# Patient Record
Sex: MALE | Race: WHITE | Employment: OTHER | URBAN - METROPOLITAN AREA
[De-identification: names, ages, dates, MRNs, and addresses within clinical notes are randomized per-mention and may not be internally consistent; named-entity substitution may affect disease eponyms.]

---

## 2017-03-06 ENCOUNTER — OFFICE VISIT (OUTPATIENT)
Dept: INTERNAL MEDICINE CLINIC | Facility: CLINIC | Age: 77
End: 2017-03-06

## 2017-03-06 VITALS
OXYGEN SATURATION: 98 % | BODY MASS INDEX: 34.86 KG/M2 | SYSTOLIC BLOOD PRESSURE: 110 MMHG | DIASTOLIC BLOOD PRESSURE: 70 MMHG | WEIGHT: 257.38 LBS | HEIGHT: 72 IN | HEART RATE: 80 BPM | TEMPERATURE: 98 F

## 2017-03-06 DIAGNOSIS — I10 ESSENTIAL HYPERTENSION: ICD-10-CM

## 2017-03-06 DIAGNOSIS — J40 BRONCHITIS: ICD-10-CM

## 2017-03-06 DIAGNOSIS — R05.9 COUGH: Primary | ICD-10-CM

## 2017-03-06 PROCEDURE — 99213 OFFICE O/P EST LOW 20 MIN: CPT | Performed by: INTERNAL MEDICINE

## 2017-03-06 PROCEDURE — G0463 HOSPITAL OUTPT CLINIC VISIT: HCPCS | Performed by: INTERNAL MEDICINE

## 2017-03-06 RX ORDER — AZITHROMYCIN 250 MG/1
TABLET, FILM COATED ORAL
Qty: 6 TABLET | Refills: 1 | Status: SHIPPED | OUTPATIENT
Start: 2017-03-06 | End: 2017-03-11

## 2017-03-06 RX ORDER — PROMETHAZINE HYDROCHLORIDE AND CODEINE PHOSPHATE 6.25; 1 MG/5ML; MG/5ML
5 SYRUP ORAL EVERY 4 HOURS PRN
Qty: 240 ML | Refills: 0 | Status: SHIPPED | OUTPATIENT
Start: 2017-03-06 | End: 2017-03-16

## 2017-03-06 NOTE — PROGRESS NOTES
Virginia Daley is a 77year old male. Patient presents with:  Cough: non productive cough, chest congestion, x 2 weeks   Hypertension    HPI:   Patient presents with:  Cough: non productive cough, chest congestion, x 2 weeks   Hypertension    Pt has had a c normal oropharynx, normal TM's. Ears are normal. Eyes are normal  NECK: supple,no lymphadenopathy or masses, no bruits  CHEST: Well-developed male.   LUNGS: clear to auscultation  CARDIO: RRR, normal S1S2, without murmur   GI:Protuberant, BS are present, no

## 2017-03-06 NOTE — PATIENT INSTRUCTIONS
1.  Patient is to continue his current diet, medications and activity. 2.  I will give the patient patient a prescription for Zithromax. I will give her one refill to use as necessary  3.   I will give the patient a prescription for promethazine with code

## 2017-04-11 RX ORDER — LISINOPRIL 10 MG/1
10 TABLET ORAL DAILY
Qty: 90 TABLET | Refills: 3 | Status: SHIPPED | OUTPATIENT
Start: 2017-04-11 | End: 2018-04-11 | Stop reason: WASHOUT

## 2017-04-11 NOTE — TELEPHONE ENCOUNTER
From: Namita Gilmore  To:  Noemi Tang MD  Sent: 4/11/2017 8:41 AM CDT  Subject: Medication Renewal Request    Original authorizing provider: MD Namita Sharma would like a refill of the following medications:  lisinopril 10 MG

## 2017-04-24 ENCOUNTER — APPOINTMENT (OUTPATIENT)
Dept: LAB | Age: 77
End: 2017-04-24
Attending: INTERNAL MEDICINE
Payer: MEDICARE

## 2017-04-24 DIAGNOSIS — E78.9 BORDERLINE HIGH CHOLESTEROL: ICD-10-CM

## 2017-04-24 PROCEDURE — 36415 COLL VENOUS BLD VENIPUNCTURE: CPT

## 2017-04-24 PROCEDURE — 80061 LIPID PANEL: CPT

## 2017-05-25 ENCOUNTER — OFFICE VISIT (OUTPATIENT)
Dept: INTERNAL MEDICINE CLINIC | Facility: CLINIC | Age: 77
End: 2017-05-25

## 2017-05-25 VITALS
TEMPERATURE: 98 F | HEART RATE: 72 BPM | WEIGHT: 260 LBS | BODY MASS INDEX: 35.21 KG/M2 | DIASTOLIC BLOOD PRESSURE: 86 MMHG | HEIGHT: 72 IN | SYSTOLIC BLOOD PRESSURE: 122 MMHG

## 2017-05-25 DIAGNOSIS — H91.91 HEARING LOSS OF RIGHT EAR, UNSPECIFIED HEARING LOSS TYPE: ICD-10-CM

## 2017-05-25 DIAGNOSIS — K57.30 DIVERTICULOSIS OF LARGE INTESTINE WITHOUT HEMORRHAGE: ICD-10-CM

## 2017-05-25 DIAGNOSIS — I10 ESSENTIAL HYPERTENSION: Primary | ICD-10-CM

## 2017-05-25 DIAGNOSIS — Z12.5 PROSTATE CANCER SCREENING: ICD-10-CM

## 2017-05-25 DIAGNOSIS — N40.1 BENIGN NON-NODULAR PROSTATIC HYPERPLASIA WITH LOWER URINARY TRACT SYMPTOMS: ICD-10-CM

## 2017-05-25 DIAGNOSIS — E78.9 BORDERLINE HIGH CHOLESTEROL: ICD-10-CM

## 2017-05-25 DIAGNOSIS — R53.83 FATIGUE, UNSPECIFIED TYPE: ICD-10-CM

## 2017-05-25 DIAGNOSIS — M15.9 PRIMARY OSTEOARTHRITIS INVOLVING MULTIPLE JOINTS: ICD-10-CM

## 2017-05-25 DIAGNOSIS — Z00.00 ANNUAL PHYSICAL EXAM: ICD-10-CM

## 2017-05-25 PROCEDURE — G0463 HOSPITAL OUTPT CLINIC VISIT: HCPCS | Performed by: INTERNAL MEDICINE

## 2017-05-25 PROCEDURE — 99214 OFFICE O/P EST MOD 30 MIN: CPT | Performed by: INTERNAL MEDICINE

## 2017-05-25 NOTE — PATIENT INSTRUCTIONS
1.  Patient is to continue his current diet, medications and activity. 2.  Patient is to continue to follow-up with Dr. Yeny Izaguirre as he is doing.   3.  I will plan to see the patient back in 6 months with blood tests, urinalysis and EKG for his annual phy

## 2017-05-25 NOTE — PROGRESS NOTES
Conner Jones is a 77year old male. Patient presents with:  Checkup: 6 month  Hypertension  Arthritis    HPI:   Patient presents with:  Checkup: 6 month  Hypertension  Arthritis    Pt feels well.   Pt had a bout of gastroenteritis last weekend while in [place] (117.935 kg)  BMI 35.25 kg/m2  GENERAL: well developed, well nourished in no acute distress  HEENT: normal oropharynx, normal TM's. Ears are normal.  Patient has hearing aids in both ears. There is no wax in his ears.   Eyes are normal  NECK: supple,no lym

## 2017-10-26 ENCOUNTER — LAB ENCOUNTER (OUTPATIENT)
Dept: LAB | Age: 77
End: 2017-10-26
Attending: INTERNAL MEDICINE
Payer: MEDICARE

## 2017-10-26 DIAGNOSIS — E78.9 BORDERLINE HIGH CHOLESTEROL: ICD-10-CM

## 2017-10-26 DIAGNOSIS — Z12.5 PROSTATE CANCER SCREENING: ICD-10-CM

## 2017-10-26 DIAGNOSIS — Z00.00 ANNUAL PHYSICAL EXAM: ICD-10-CM

## 2017-10-26 DIAGNOSIS — R53.83 FATIGUE, UNSPECIFIED TYPE: ICD-10-CM

## 2017-10-26 PROCEDURE — 80053 COMPREHEN METABOLIC PANEL: CPT

## 2017-10-26 PROCEDURE — 36415 COLL VENOUS BLD VENIPUNCTURE: CPT

## 2017-10-26 PROCEDURE — 80061 LIPID PANEL: CPT

## 2017-10-26 PROCEDURE — 84443 ASSAY THYROID STIM HORMONE: CPT

## 2017-10-26 PROCEDURE — 85025 COMPLETE CBC W/AUTO DIFF WBC: CPT

## 2017-10-26 PROCEDURE — 81001 URINALYSIS AUTO W/SCOPE: CPT

## 2017-11-01 ENCOUNTER — OFFICE VISIT (OUTPATIENT)
Dept: INTERNAL MEDICINE CLINIC | Facility: CLINIC | Age: 77
End: 2017-11-01

## 2017-11-01 VITALS
DIASTOLIC BLOOD PRESSURE: 84 MMHG | HEART RATE: 80 BPM | HEIGHT: 72 IN | BODY MASS INDEX: 34.81 KG/M2 | OXYGEN SATURATION: 98 % | WEIGHT: 257 LBS | TEMPERATURE: 98 F | SYSTOLIC BLOOD PRESSURE: 130 MMHG

## 2017-11-01 DIAGNOSIS — N40.1 BENIGN NON-NODULAR PROSTATIC HYPERPLASIA WITH LOWER URINARY TRACT SYMPTOMS: ICD-10-CM

## 2017-11-01 DIAGNOSIS — E78.9 BORDERLINE HIGH CHOLESTEROL: ICD-10-CM

## 2017-11-01 DIAGNOSIS — R53.83 FATIGUE, UNSPECIFIED TYPE: ICD-10-CM

## 2017-11-01 DIAGNOSIS — M15.9 PRIMARY OSTEOARTHRITIS INVOLVING MULTIPLE JOINTS: ICD-10-CM

## 2017-11-01 DIAGNOSIS — D69.6 THROMBOCYTOPENIA (HCC): ICD-10-CM

## 2017-11-01 DIAGNOSIS — Z00.00 ANNUAL PHYSICAL EXAM: Primary | ICD-10-CM

## 2017-11-01 DIAGNOSIS — I10 ESSENTIAL HYPERTENSION: ICD-10-CM

## 2017-11-01 DIAGNOSIS — K57.30 DIVERTICULOSIS OF COLON: ICD-10-CM

## 2017-11-01 DIAGNOSIS — R94.31 ABNORMAL EKG: ICD-10-CM

## 2017-11-01 PROCEDURE — G0439 PPPS, SUBSEQ VISIT: HCPCS | Performed by: INTERNAL MEDICINE

## 2017-11-01 PROCEDURE — 99214 OFFICE O/P EST MOD 30 MIN: CPT | Performed by: INTERNAL MEDICINE

## 2017-11-01 PROCEDURE — 93005 ELECTROCARDIOGRAM TRACING: CPT | Performed by: INTERNAL MEDICINE

## 2017-11-01 PROCEDURE — 82272 OCCULT BLD FECES 1-3 TESTS: CPT | Performed by: INTERNAL MEDICINE

## 2017-11-01 PROCEDURE — 93010 ELECTROCARDIOGRAM REPORT: CPT | Performed by: INTERNAL MEDICINE

## 2017-11-01 PROCEDURE — G0463 HOSPITAL OUTPT CLINIC VISIT: HCPCS | Performed by: INTERNAL MEDICINE

## 2017-11-01 NOTE — PROGRESS NOTES
Pasquale Dennis is a 69year old male who presents for a complete physical exam.   HPI:   Mr. Divya Ramsey is a 81-year-old white male who was seen by me on November 1, 2017 for his Medicare annual physical examination. At the time the examination Mr. Sousa Dementia Mother    • Heart Disease Mother      CAD   • Heart Disease Sister    • Heart Disease Father      CAD + ASHD   • Renal Disease Father    • Blood Disorder Brother      blood clot in leg   • Cancer Brother      Prostate cancer in remission      Soci intact. NEURO:Alert and oriented, CN are intact, DTRs are 1+ Bilaterally. Pt's EKG has an NSR of 70 beats. Its axis is a -45° angle. The EKG has left axis deviation. The EKG is similar to the patient's previous EKG. The patient's FBS was 100.   The 135,000. We will observe this at this time. I will repeat the patient's CBC in 6 months.    - CBC WITH DIFFERENTIAL WITH PLATELET; Future    9. Abnormal EKG  Stable. CPM.  Patient's EKG is unchanged from before.       Antoni Banegas MD  11/1/2017  12 weeks)?: Not at all    PHQ-2 SCORE: 0        Advance Directives     Do you have a healthcare power of ?: Yes    Do you have a living will?: Yes     Hearing Assessment (Required for AWV/SWV)      Hearing Screening    Time taken:  11/1/2017 10:34 AM this list are recommended by your physician but may not be covered, or covered at this frequency, by your insurer. Please check with your insurance carrier before scheduling to verify coverage.     PREVENTATIVE SERVICES  INDICATIONS AND SCHEDULE Internal [place] applicable    Zoster (Not covered by Medicare Part B) No orders found for this or any previous visit.  Update Immunization Activity if applicable     SPECIFIC DISEASE MONITORING Internal Lab or Procedure External Lab or Procedure   Annual Monitoring of Pers

## 2018-05-08 ENCOUNTER — TELEPHONE (OUTPATIENT)
Dept: INTERNAL MEDICINE CLINIC | Facility: CLINIC | Age: 78
End: 2018-05-08

## 2018-05-08 DIAGNOSIS — Z00.00 EVALUATION BY MEDICAL SERVICE REQUIRED: Primary | ICD-10-CM

## 2018-05-08 RX ORDER — LISINOPRIL 10 MG/1
10 TABLET ORAL DAILY
Qty: 90 TABLET | Refills: 3 | Status: SHIPPED | OUTPATIENT
Start: 2018-05-08 | End: 2019-04-22

## 2018-05-08 NOTE — TELEPHONE ENCOUNTER
Noted. Please call pt/wife and tell them that I recommend they see Dr Александр Pena as a Podiatrist for Podiatry evaluation. I will forward this to nursing.   Thank you!!

## 2018-05-08 NOTE — TELEPHONE ENCOUNTER
NOted,  OK to refer pt to see Dr Naheed Small who is a Podiatrist at the [facility], [facility] whose office is in [place]. I will refer this to nursing.   Thank you!!

## 2018-05-08 NOTE — TELEPHONE ENCOUNTER
Pt wife Gina Frazier calling pt would like a recommendation for a podiatrist, please call [phone]  Pt aware Dr GUSTAFSON is out today   Tasked to nursing

## 2018-05-08 NOTE — TELEPHONE ENCOUNTER
Refill request is for a maintenance medication and has met the criteria specified in the Ambulatory Medication Refill Standing Order for eligibility, visits, laboratory, alerts and was sent to the requested pharmacy.   Called Jeremy Cosby and notified her refill

## 2018-05-08 NOTE — TELEPHONE ENCOUNTER
Called wife. They would prefer a different recommendation. They had a bad experience with the billing department at Dr Marisa Em office. To Dr GUSTAFSON-----can you please provide another recommendation. Thank you!

## 2018-05-08 NOTE — TELEPHONE ENCOUNTER
Dr. Onofre Shi message relayed to pt's wife who verbalized understanding.   Referral completed for Dr. Evette Lesch for [address] location per pt's wife request.

## 2018-05-15 ENCOUNTER — LAB ENCOUNTER (OUTPATIENT)
Dept: LAB | Age: 78
End: 2018-05-15
Attending: INTERNAL MEDICINE
Payer: MEDICARE

## 2018-05-15 DIAGNOSIS — D69.6 THROMBOCYTOPENIA (HCC): ICD-10-CM

## 2018-05-15 DIAGNOSIS — E78.9 BORDERLINE HIGH CHOLESTEROL: ICD-10-CM

## 2018-05-15 PROCEDURE — 36415 COLL VENOUS BLD VENIPUNCTURE: CPT

## 2018-05-15 PROCEDURE — 85025 COMPLETE CBC W/AUTO DIFF WBC: CPT

## 2018-05-15 PROCEDURE — 80061 LIPID PANEL: CPT

## 2018-05-21 ENCOUNTER — OFFICE VISIT (OUTPATIENT)
Dept: INTERNAL MEDICINE CLINIC | Facility: CLINIC | Age: 78
End: 2018-05-21

## 2018-05-21 VITALS
DIASTOLIC BLOOD PRESSURE: 80 MMHG | TEMPERATURE: 98 F | WEIGHT: 259 LBS | BODY MASS INDEX: 34.33 KG/M2 | OXYGEN SATURATION: 98 % | HEART RATE: 76 BPM | HEIGHT: 73 IN | SYSTOLIC BLOOD PRESSURE: 136 MMHG

## 2018-05-21 DIAGNOSIS — Z12.5 PROSTATE CANCER SCREENING: ICD-10-CM

## 2018-05-21 DIAGNOSIS — R53.83 FATIGUE, UNSPECIFIED TYPE: ICD-10-CM

## 2018-05-21 DIAGNOSIS — D69.6 THROMBOCYTOPENIA (HCC): ICD-10-CM

## 2018-05-21 DIAGNOSIS — M15.9 PRIMARY OSTEOARTHRITIS INVOLVING MULTIPLE JOINTS: ICD-10-CM

## 2018-05-21 DIAGNOSIS — Z00.00 ANNUAL PHYSICAL EXAM: ICD-10-CM

## 2018-05-21 DIAGNOSIS — N40.0 BENIGN PROSTATIC HYPERPLASIA WITHOUT LOWER URINARY TRACT SYMPTOMS: ICD-10-CM

## 2018-05-21 DIAGNOSIS — N40.1 BENIGN NON-NODULAR PROSTATIC HYPERPLASIA WITH LOWER URINARY TRACT SYMPTOMS: ICD-10-CM

## 2018-05-21 DIAGNOSIS — I10 ESSENTIAL HYPERTENSION: Primary | ICD-10-CM

## 2018-05-21 DIAGNOSIS — E78.9 BORDERLINE HIGH CHOLESTEROL: ICD-10-CM

## 2018-05-21 DIAGNOSIS — K57.30 DIVERTICULOSIS OF COLON: ICD-10-CM

## 2018-05-21 PROCEDURE — G0463 HOSPITAL OUTPT CLINIC VISIT: HCPCS | Performed by: INTERNAL MEDICINE

## 2018-05-21 PROCEDURE — 99214 OFFICE O/P EST MOD 30 MIN: CPT | Performed by: INTERNAL MEDICINE

## 2018-05-21 NOTE — PATIENT INSTRUCTIONS
1.  Pt is to continue his current diet, meds and activity. 2.  I will see pt back in 6 months with blood tests, U/A and EKG for his Annual Physical Exam.  3.  I will see pt back sooner as michael.

## 2018-05-21 NOTE — PROGRESS NOTES
Tricia Posadas is a 69year old male. Patient presents with: Follow - Up: 6 month visit. Patient presents to review lab results   Hypertension  Arthritis    HPI:   Patient presents with: Follow - Up: 6 month visit.  Patient presents to review lab results pain or swelling in patient's legs    EXAM:   /80 (BP Location: Right arm, Patient Position: Sitting, Cuff Size: large)   Pulse 76   Temp 98 °F (36.7 °C) (Oral)   Ht 6' 1\" (1.854 m)   Wt 259 lb (117.5 kg)   SpO2 98%   BMI 34.17 kg/m²   GENERAL: well

## 2018-06-08 ENCOUNTER — OFFICE VISIT (OUTPATIENT)
Dept: PODIATRY CLINIC | Facility: CLINIC | Age: 78
End: 2018-06-08

## 2018-06-08 DIAGNOSIS — L84 FOOT CALLUS: ICD-10-CM

## 2018-06-08 DIAGNOSIS — M79.674 PAIN OF TOE OF RIGHT FOOT: Primary | ICD-10-CM

## 2018-06-08 PROCEDURE — 99203 OFFICE O/P NEW LOW 30 MIN: CPT | Performed by: PODIATRIST

## 2018-06-08 PROCEDURE — L3060 FOOT ARCH SUPP LONGITUD/META: HCPCS | Performed by: PODIATRIST

## 2018-06-08 PROCEDURE — G0463 HOSPITAL OUTPT CLINIC VISIT: HCPCS | Performed by: PODIATRIST

## 2018-06-08 NOTE — PROGRESS NOTES
HPI:    Patient ID: Jacob Huber is a 69year old male. HPI  This pleasant 60-year-old male presents as a new patient to me on referral from [facility]. Patient's chief complaint is pain associated with the right great toe.   The pain is been presen I dispensed a temporary insole and placed it in his shoes and encouraged shoes at all times meaning no barefoot walking. I plan to reevaluate in 3-4 weeks to assess the benefits of debridement and handed control.   It is my impression he needs added suppor

## 2018-07-26 ENCOUNTER — OFFICE VISIT (OUTPATIENT)
Dept: PODIATRY CLINIC | Facility: CLINIC | Age: 78
End: 2018-07-26
Payer: MEDICARE

## 2018-07-26 DIAGNOSIS — M79.674 PAIN OF TOE OF RIGHT FOOT: ICD-10-CM

## 2018-07-26 DIAGNOSIS — L84 FOOT CALLUS: Primary | ICD-10-CM

## 2018-07-26 PROCEDURE — 99212 OFFICE O/P EST SF 10 MIN: CPT | Performed by: PODIATRIST

## 2018-07-26 PROCEDURE — G0463 HOSPITAL OUTPT CLINIC VISIT: HCPCS | Performed by: PODIATRIST

## 2018-07-26 NOTE — PROGRESS NOTES
HPI:    Patient ID: Randall Miranda is a 69year old male. HPI  Patient presents for further discussion in reference to appropriate shoes, support, and callus formation.   He struggling to find the right size that fits and is comfortable and causing no ma

## 2018-10-29 ENCOUNTER — LAB ENCOUNTER (OUTPATIENT)
Dept: LAB | Age: 78
End: 2018-10-29
Attending: INTERNAL MEDICINE
Payer: MEDICARE

## 2018-10-29 DIAGNOSIS — Z12.5 PROSTATE CANCER SCREENING: ICD-10-CM

## 2018-10-29 DIAGNOSIS — E78.9 BORDERLINE HIGH CHOLESTEROL: ICD-10-CM

## 2018-10-29 DIAGNOSIS — R53.83 FATIGUE, UNSPECIFIED TYPE: ICD-10-CM

## 2018-10-29 DIAGNOSIS — Z00.00 ANNUAL PHYSICAL EXAM: ICD-10-CM

## 2018-10-29 PROCEDURE — 36415 COLL VENOUS BLD VENIPUNCTURE: CPT

## 2018-10-29 PROCEDURE — 80061 LIPID PANEL: CPT

## 2018-10-29 PROCEDURE — 84443 ASSAY THYROID STIM HORMONE: CPT

## 2018-10-29 PROCEDURE — 85025 COMPLETE CBC W/AUTO DIFF WBC: CPT

## 2018-10-29 PROCEDURE — 80053 COMPREHEN METABOLIC PANEL: CPT

## 2018-10-29 PROCEDURE — 81001 URINALYSIS AUTO W/SCOPE: CPT

## 2018-11-15 ENCOUNTER — OFFICE VISIT (OUTPATIENT)
Dept: INTERNAL MEDICINE CLINIC | Facility: CLINIC | Age: 78
End: 2018-11-15
Payer: MEDICARE

## 2018-11-15 VITALS
HEIGHT: 73 IN | OXYGEN SATURATION: 94 % | DIASTOLIC BLOOD PRESSURE: 86 MMHG | HEART RATE: 76 BPM | SYSTOLIC BLOOD PRESSURE: 124 MMHG | WEIGHT: 263.19 LBS | TEMPERATURE: 98 F | BODY MASS INDEX: 34.88 KG/M2

## 2018-11-15 DIAGNOSIS — N40.1 BENIGN PROSTATIC HYPERPLASIA WITH LOWER URINARY TRACT SYMPTOMS, SYMPTOM DETAILS UNSPECIFIED: ICD-10-CM

## 2018-11-15 DIAGNOSIS — E78.9 BORDERLINE HIGH CHOLESTEROL: ICD-10-CM

## 2018-11-15 DIAGNOSIS — Z00.00 ANNUAL PHYSICAL EXAM: Primary | ICD-10-CM

## 2018-11-15 DIAGNOSIS — D69.6 THROMBOCYTOPENIA (HCC): ICD-10-CM

## 2018-11-15 DIAGNOSIS — I10 ESSENTIAL HYPERTENSION: ICD-10-CM

## 2018-11-15 DIAGNOSIS — M15.9 PRIMARY OSTEOARTHRITIS INVOLVING MULTIPLE JOINTS: ICD-10-CM

## 2018-11-15 DIAGNOSIS — R73.01 ABNORMAL FASTING GLUCOSE: ICD-10-CM

## 2018-11-15 DIAGNOSIS — R94.31 ABNORMAL EKG: ICD-10-CM

## 2018-11-15 DIAGNOSIS — K57.30 DIVERTICULOSIS OF COLON: ICD-10-CM

## 2018-11-15 PROCEDURE — 93010 ELECTROCARDIOGRAM REPORT: CPT | Performed by: INTERNAL MEDICINE

## 2018-11-15 PROCEDURE — G0439 PPPS, SUBSEQ VISIT: HCPCS | Performed by: INTERNAL MEDICINE

## 2018-11-15 PROCEDURE — 82272 OCCULT BLD FECES 1-3 TESTS: CPT | Performed by: INTERNAL MEDICINE

## 2018-11-15 PROCEDURE — 90732 PPSV23 VACC 2 YRS+ SUBQ/IM: CPT | Performed by: INTERNAL MEDICINE

## 2018-11-15 PROCEDURE — 99214 OFFICE O/P EST MOD 30 MIN: CPT | Performed by: INTERNAL MEDICINE

## 2018-11-15 PROCEDURE — G0463 HOSPITAL OUTPT CLINIC VISIT: HCPCS | Performed by: INTERNAL MEDICINE

## 2018-11-15 PROCEDURE — 93005 ELECTROCARDIOGRAM TRACING: CPT | Performed by: INTERNAL MEDICINE

## 2018-11-15 PROCEDURE — G0009 ADMIN PNEUMOCOCCAL VACCINE: HCPCS | Performed by: INTERNAL MEDICINE

## 2018-11-15 NOTE — PATIENT INSTRUCTIONS
1.  Patient is to continue his current diet, medication and activity. 2.  Patient will be given his Pneumovax 23 today. 3.  I will plan see the patient back in 6 months with blood tests which include an FBS, hemoglobin A1c and lipid panel.   4.  Patient w

## 2018-11-16 NOTE — PROGRESS NOTES
Fredy Robb is a 69year old male who presents for a complete physical exam.   HPI:   Mr. Radha Montgomery Amairani Red is a 52-year-old white male who was seen by me on November 15, 2018 for his Medicare annual physical examination.   At the time of examination Mr. Jess Nelson • Renal Disease Father    • Blood Disorder Brother         blood clot in leg   • Cancer Brother         Prostate cancer in remission      Social History:  Social History    Tobacco Use      Smoking status: Former Smoker        Types: Pipe        Quit austin EKG has a left axis deviation. EKG has NSSTTWCs lead aVL. Patient's EKG is similar to patient's previous EKG. Patient's FBS was 111.   The remainder of his CMP was normal.  His CBC was normal his urinalysis was normal his lipid panel had a cholesterol of In the past six months, have you lost more than 10 pounds without trying?: 2 - No    Has your appetite been poor?: No    Type of Diet: Balanced    How does the patient maintain a good energy level?: Appropriate Exercise    How would you describe your leena conversations when two or more people are talking at the same time:  Yes   I have trouble understanding things on the TV:  Yes I have to strain to understand conversations:  Yes   I have to worry about missing the telephone ring or doorbell:  No I have tro 10/29/2018 111 (H)     GLUCOSE (P) (mg/dL)   Date Value   10/14/2015 103 (H)       Cardiovascular Disease Screening     LDL Annually LDL Cholesterol (mg/dL)   Date Value   10/29/2018 110 (H)   05/02/2016 120 (H)        EKG One Time Pt had his EKG done to POTASSIUM (P) (mmol/L)   Date Value   10/14/2015 4.5    No flowsheet data found. Creatinine  Annually Creatinine (mg/dL)   Date Value   10/29/2018 1.15     CREATININE (P) (mg/dL)   Date Value   10/14/2015 1.15    No flowsheet data found.     Digoxin

## 2019-04-17 ENCOUNTER — APPOINTMENT (OUTPATIENT)
Dept: LAB | Age: 79
End: 2019-04-17
Attending: INTERNAL MEDICINE
Payer: MEDICARE

## 2019-04-17 DIAGNOSIS — E78.9 BORDERLINE HIGH CHOLESTEROL: ICD-10-CM

## 2019-04-17 DIAGNOSIS — R73.01 ABNORMAL FASTING GLUCOSE: ICD-10-CM

## 2019-04-17 PROCEDURE — 82947 ASSAY GLUCOSE BLOOD QUANT: CPT

## 2019-04-17 PROCEDURE — 80061 LIPID PANEL: CPT

## 2019-04-17 PROCEDURE — 36415 COLL VENOUS BLD VENIPUNCTURE: CPT

## 2019-04-17 PROCEDURE — 83036 HEMOGLOBIN GLYCOSYLATED A1C: CPT

## 2019-04-23 RX ORDER — LISINOPRIL 10 MG/1
10 TABLET ORAL DAILY
Qty: 90 TABLET | Refills: 3 | Status: SHIPPED | OUTPATIENT
Start: 2019-04-23 | End: 2019-12-05

## 2019-06-05 ENCOUNTER — OFFICE VISIT (OUTPATIENT)
Dept: INTERNAL MEDICINE CLINIC | Facility: CLINIC | Age: 79
End: 2019-06-05
Payer: MEDICARE

## 2019-06-05 VITALS
HEIGHT: 73 IN | WEIGHT: 261 LBS | DIASTOLIC BLOOD PRESSURE: 86 MMHG | SYSTOLIC BLOOD PRESSURE: 126 MMHG | HEART RATE: 80 BPM | TEMPERATURE: 99 F | BODY MASS INDEX: 34.59 KG/M2

## 2019-06-05 DIAGNOSIS — I10 ESSENTIAL HYPERTENSION: Primary | ICD-10-CM

## 2019-06-05 DIAGNOSIS — N40.1 BENIGN PROSTATIC HYPERPLASIA WITH LOWER URINARY TRACT SYMPTOMS, SYMPTOM DETAILS UNSPECIFIED: ICD-10-CM

## 2019-06-05 DIAGNOSIS — Z00.00 ANNUAL PHYSICAL EXAM: ICD-10-CM

## 2019-06-05 DIAGNOSIS — E78.9 BORDERLINE HIGH CHOLESTEROL: ICD-10-CM

## 2019-06-05 DIAGNOSIS — M15.9 PRIMARY OSTEOARTHRITIS INVOLVING MULTIPLE JOINTS: ICD-10-CM

## 2019-06-05 DIAGNOSIS — Z12.5 PROSTATE CANCER SCREENING: ICD-10-CM

## 2019-06-05 DIAGNOSIS — R73.01 ABNORMAL FASTING GLUCOSE: ICD-10-CM

## 2019-06-05 DIAGNOSIS — R53.83 FATIGUE, UNSPECIFIED TYPE: ICD-10-CM

## 2019-06-05 DIAGNOSIS — K57.30 DIVERTICULOSIS OF COLON: ICD-10-CM

## 2019-06-05 PROCEDURE — G0463 HOSPITAL OUTPT CLINIC VISIT: HCPCS | Performed by: INTERNAL MEDICINE

## 2019-06-05 PROCEDURE — 99214 OFFICE O/P EST MOD 30 MIN: CPT | Performed by: INTERNAL MEDICINE

## 2019-06-05 NOTE — PROGRESS NOTES
Manoj Cruz is a 69year old male. Patient presents with:  Checkup  Hypertension  Arthritis    HPI:   Patient presents with:  Checkup  Hypertension  Arthritis    Pt feels well.   Pt and wife have now been visiting their son and his family who are now stat normal  NECK: supple,no lymphadenopathy or masses, no bruits  CHEST: Well-developed male.   LUNGS: clear to auscultation  CARDIO: RRR, normal S1S2, without murmur   GI:Protuberant, BS are present, no organomegaly or palpable masses  EXTREMITIES: no edema  N

## 2019-06-05 NOTE — PATIENT INSTRUCTIONS
1.  Patient is to continue his current diet, medication and activity. 2.  Patient is to watch his diet more closely to see if he can cut back and lose about 10 to 15 pounds.   3.  I will plan to see the patient back in 6 months with blood test, urinalysis

## 2019-11-01 ENCOUNTER — LAB ENCOUNTER (OUTPATIENT)
Dept: LAB | Age: 79
End: 2019-11-01
Attending: INTERNAL MEDICINE
Payer: MEDICARE

## 2019-11-01 DIAGNOSIS — Z12.5 PROSTATE CANCER SCREENING: ICD-10-CM

## 2019-11-01 DIAGNOSIS — R53.83 FATIGUE, UNSPECIFIED TYPE: ICD-10-CM

## 2019-11-01 DIAGNOSIS — Z00.00 ANNUAL PHYSICAL EXAM: ICD-10-CM

## 2019-11-01 DIAGNOSIS — E78.9 BORDERLINE HIGH CHOLESTEROL: ICD-10-CM

## 2019-11-01 DIAGNOSIS — R73.01 ABNORMAL FASTING GLUCOSE: ICD-10-CM

## 2019-11-01 PROCEDURE — 36415 COLL VENOUS BLD VENIPUNCTURE: CPT

## 2019-11-01 PROCEDURE — 81003 URINALYSIS AUTO W/O SCOPE: CPT

## 2019-11-01 PROCEDURE — 84443 ASSAY THYROID STIM HORMONE: CPT

## 2019-11-01 PROCEDURE — 80053 COMPREHEN METABOLIC PANEL: CPT

## 2019-11-01 PROCEDURE — 83036 HEMOGLOBIN GLYCOSYLATED A1C: CPT

## 2019-11-01 PROCEDURE — 85025 COMPLETE CBC W/AUTO DIFF WBC: CPT

## 2019-11-01 PROCEDURE — 80061 LIPID PANEL: CPT

## 2019-12-05 ENCOUNTER — OFFICE VISIT (OUTPATIENT)
Dept: INTERNAL MEDICINE CLINIC | Facility: CLINIC | Age: 79
End: 2019-12-05
Payer: MEDICARE

## 2019-12-05 VITALS
HEIGHT: 73 IN | OXYGEN SATURATION: 99 % | SYSTOLIC BLOOD PRESSURE: 140 MMHG | DIASTOLIC BLOOD PRESSURE: 94 MMHG | TEMPERATURE: 98 F | HEART RATE: 72 BPM | BODY MASS INDEX: 34.72 KG/M2 | WEIGHT: 262 LBS

## 2019-12-05 DIAGNOSIS — D69.6 THROMBOCYTOPENIA (HCC): ICD-10-CM

## 2019-12-05 DIAGNOSIS — K57.30 DIVERTICULOSIS OF COLON: ICD-10-CM

## 2019-12-05 DIAGNOSIS — R73.01 ABNORMAL FASTING GLUCOSE: ICD-10-CM

## 2019-12-05 DIAGNOSIS — N40.1 BENIGN PROSTATIC HYPERPLASIA WITH LOWER URINARY TRACT SYMPTOMS, SYMPTOM DETAILS UNSPECIFIED: ICD-10-CM

## 2019-12-05 DIAGNOSIS — I10 ESSENTIAL HYPERTENSION: ICD-10-CM

## 2019-12-05 DIAGNOSIS — M15.9 PRIMARY OSTEOARTHRITIS INVOLVING MULTIPLE JOINTS: ICD-10-CM

## 2019-12-05 DIAGNOSIS — E78.9 BORDERLINE HIGH CHOLESTEROL: ICD-10-CM

## 2019-12-05 DIAGNOSIS — Z00.00 ANNUAL PHYSICAL EXAM: Primary | ICD-10-CM

## 2019-12-05 DIAGNOSIS — R53.83 FATIGUE, UNSPECIFIED TYPE: ICD-10-CM

## 2019-12-05 PROCEDURE — 93010 ELECTROCARDIOGRAM REPORT: CPT | Performed by: INTERNAL MEDICINE

## 2019-12-05 PROCEDURE — 82272 OCCULT BLD FECES 1-3 TESTS: CPT | Performed by: INTERNAL MEDICINE

## 2019-12-05 PROCEDURE — G0439 PPPS, SUBSEQ VISIT: HCPCS | Performed by: INTERNAL MEDICINE

## 2019-12-05 PROCEDURE — 93005 ELECTROCARDIOGRAM TRACING: CPT | Performed by: INTERNAL MEDICINE

## 2019-12-05 PROCEDURE — G0463 HOSPITAL OUTPT CLINIC VISIT: HCPCS | Performed by: INTERNAL MEDICINE

## 2019-12-05 PROCEDURE — 99214 OFFICE O/P EST MOD 30 MIN: CPT | Performed by: INTERNAL MEDICINE

## 2019-12-05 RX ORDER — LISINOPRIL 20 MG/1
20 TABLET ORAL DAILY
Qty: 90 TABLET | Refills: 3 | Status: ON HOLD | OUTPATIENT
Start: 2019-12-05 | End: 2020-10-18

## 2019-12-05 NOTE — PATIENT INSTRUCTIONS
1.  Patient is to continue his current diet, medication and activity. 2.  I will increase the patient's lisinopril to 20 mg orally every morning. 3.  Patient is to watch his diet, attempt to exercise and lose weight in the range of 10 to 20 pounds.   4.

## 2019-12-06 NOTE — PROGRESS NOTES
Manoj Cruz is a 67year old male who presents for a complete physical exam.   HPI:   Mr. Melissa Pulliam. Christa Godfrey is a 23-year-old white male who was seen by me on December 5, 2019 for his Medicare annual physical examination.   At the time of the examination EUGENE Mother    • Heart Disease Mother         CAD   • Heart Disease Sister    • Heart Disease Father         CAD + ASHD   • Renal Disease Father    • Blood Disorder Brother         blood clot in leg   • Cancer Brother         Prostate cancer in remission      S CN are intact, DTRs are 1+ Bilaterally. Patient's EKG has an NSR of 70 bpm.  Its axis is a -30 degree angle. Patient's EKG is normal.    CBC had a platelet count of 008,908. The remainder the CBC was normal.  Patient's CMP had an FBS of 105.   Patient' Patient's right ankle is worse. This is the ankle that had previous ankle surgery in the past.    5. Diverticulosis of colon  Stable. CPM.    6. Abnormal fasting glucose  Stable. CPM.  Patient's recent FBS was 105. His hemoglobin A1c was 5.8.   Patient without help    Taking medications as prescribed: Able without help    Are you able to afford your medications?: Yes    Hearing Problems?: Yes     Functional Status     Hearing Problems?: Yes    Vision Problems? : No    Difficulty walking?: No    Difficult responses:  No I avoid social activities because I cannot hear well and fear I will reply improperly:  No   Family members and friends have told me they think I may have hearing loss:  Yes           Visual Acuity     Right Eye Visual Acuity: Corrected Left Influenza No orders found for this or any previous visit.  Update Immunization Activity if applicable    Pneumococcal Orders placed or performed in visit on 11/15/18   • PNEUMOCOCCAL IMM (PNEUMOVAX)   Orders placed or performed in visit on 10/20/14   • PNEU

## 2020-02-29 ENCOUNTER — LAB ENCOUNTER (OUTPATIENT)
Dept: LAB | Age: 80
End: 2020-02-29
Attending: INTERNAL MEDICINE
Payer: MEDICARE

## 2020-02-29 DIAGNOSIS — D69.6 THROMBOCYTOPENIA (HCC): ICD-10-CM

## 2020-02-29 DIAGNOSIS — E78.9 BORDERLINE HIGH CHOLESTEROL: ICD-10-CM

## 2020-02-29 DIAGNOSIS — R73.01 ABNORMAL FASTING GLUCOSE: ICD-10-CM

## 2020-02-29 LAB
BASOPHILS # BLD AUTO: 0.04 X10(3) UL (ref 0–0.2)
BASOPHILS NFR BLD AUTO: 0.5 %
CHOLEST SMN-MCNC: 158 MG/DL (ref ?–200)
DEPRECATED RDW RBC AUTO: 44.9 FL (ref 35.1–46.3)
EOSINOPHIL # BLD AUTO: 0.39 X10(3) UL (ref 0–0.7)
EOSINOPHIL NFR BLD AUTO: 4.8 %
ERYTHROCYTE [DISTWIDTH] IN BLOOD BY AUTOMATED COUNT: 13.4 % (ref 11–15)
GLUCOSE BLD-MCNC: 112 MG/DL (ref 70–99)
HCT VFR BLD AUTO: 46.2 % (ref 39–53)
HDLC SERPL-MCNC: 51 MG/DL (ref 40–59)
HGB BLD-MCNC: 15.4 G/DL (ref 13–17.5)
IMM GRANULOCYTES # BLD AUTO: 0.04 X10(3) UL (ref 0–1)
IMM GRANULOCYTES NFR BLD: 0.5 %
LDLC SERPL CALC-MCNC: 95 MG/DL (ref ?–100)
LYMPHOCYTES # BLD AUTO: 1.37 X10(3) UL (ref 1–4)
LYMPHOCYTES NFR BLD AUTO: 16.9 %
MCH RBC QN AUTO: 30.2 PG (ref 26–34)
MCHC RBC AUTO-ENTMCNC: 33.3 G/DL (ref 31–37)
MCV RBC AUTO: 90.6 FL (ref 80–100)
MONOCYTES # BLD AUTO: 0.71 X10(3) UL (ref 0.1–1)
MONOCYTES NFR BLD AUTO: 8.8 %
NEUTROPHILS # BLD AUTO: 5.55 X10 (3) UL (ref 1.5–7.7)
NEUTROPHILS # BLD AUTO: 5.55 X10(3) UL (ref 1.5–7.7)
NEUTROPHILS NFR BLD AUTO: 68.5 %
NONHDLC SERPL-MCNC: 107 MG/DL (ref ?–130)
PATIENT FASTING Y/N/NP: YES
PATIENT FASTING Y/N/NP: YES
PLATELET # BLD AUTO: 135 10(3)UL (ref 150–450)
RBC # BLD AUTO: 5.1 X10(6)UL (ref 3.8–5.8)
TRIGL SERPL-MCNC: 61 MG/DL (ref 30–149)
VLDLC SERPL CALC-MCNC: 12 MG/DL (ref 0–30)
WBC # BLD AUTO: 8.1 X10(3) UL (ref 4–11)

## 2020-02-29 PROCEDURE — 82947 ASSAY GLUCOSE BLOOD QUANT: CPT

## 2020-02-29 PROCEDURE — 85025 COMPLETE CBC W/AUTO DIFF WBC: CPT

## 2020-02-29 PROCEDURE — 80061 LIPID PANEL: CPT

## 2020-02-29 PROCEDURE — 36415 COLL VENOUS BLD VENIPUNCTURE: CPT

## 2020-03-05 ENCOUNTER — OFFICE VISIT (OUTPATIENT)
Dept: INTERNAL MEDICINE CLINIC | Facility: CLINIC | Age: 80
End: 2020-03-05
Payer: MEDICARE

## 2020-03-05 VITALS
HEART RATE: 76 BPM | SYSTOLIC BLOOD PRESSURE: 136 MMHG | TEMPERATURE: 98 F | OXYGEN SATURATION: 98 % | DIASTOLIC BLOOD PRESSURE: 84 MMHG | HEIGHT: 73 IN | WEIGHT: 259.81 LBS | BODY MASS INDEX: 34.43 KG/M2

## 2020-03-05 DIAGNOSIS — N40.1 BENIGN PROSTATIC HYPERPLASIA WITH LOWER URINARY TRACT SYMPTOMS, SYMPTOM DETAILS UNSPECIFIED: ICD-10-CM

## 2020-03-05 DIAGNOSIS — I10 ESSENTIAL HYPERTENSION: Primary | ICD-10-CM

## 2020-03-05 DIAGNOSIS — K57.30 DIVERTICULOSIS OF COLON: ICD-10-CM

## 2020-03-05 DIAGNOSIS — M15.9 PRIMARY OSTEOARTHRITIS INVOLVING MULTIPLE JOINTS: ICD-10-CM

## 2020-03-05 DIAGNOSIS — D69.6 THROMBOCYTOPENIA (HCC): ICD-10-CM

## 2020-03-05 DIAGNOSIS — R73.01 ABNORMAL FASTING GLUCOSE: ICD-10-CM

## 2020-03-05 DIAGNOSIS — E78.9 BORDERLINE HIGH CHOLESTEROL: ICD-10-CM

## 2020-03-05 DIAGNOSIS — R53.83 FATIGUE, UNSPECIFIED TYPE: ICD-10-CM

## 2020-03-05 PROCEDURE — G0463 HOSPITAL OUTPT CLINIC VISIT: HCPCS | Performed by: INTERNAL MEDICINE

## 2020-03-05 PROCEDURE — 99214 OFFICE O/P EST MOD 30 MIN: CPT | Performed by: INTERNAL MEDICINE

## 2020-03-05 NOTE — PROGRESS NOTES
Sukumar Sexton is a 67year old male. Patient presents with:  Checkup: 3 month   Hypertension  Arthritis    HPI:   Patient presents with:  Checkup: 3 month   Hypertension  Arthritis    Pt feels well.   Patient is having some trouble with the hearing in wax in patient's right external auditory canal the tympanic membrane appears normal.  Patient has minimal wax in the left external auditory canal.  The tympanic membrane appears normal.  There is a small amount of wax that appears to be near the tympanic m MD  3/5/2020  9:06 AM

## 2020-03-05 NOTE — PATIENT INSTRUCTIONS
1.  Patient is to continue his current diet, medication and activity. 2.  Patient to follow-up with his hearing aid person. If his problem persists he may need to follow-up with his ENT physician, Dr. Delmar Duenas  3.   I will plan to see the patient back i

## 2020-05-28 ENCOUNTER — LAB ENCOUNTER (OUTPATIENT)
Dept: LAB | Age: 80
End: 2020-05-28
Attending: INTERNAL MEDICINE
Payer: MEDICARE

## 2020-05-28 DIAGNOSIS — R73.01 ABNORMAL FASTING GLUCOSE: ICD-10-CM

## 2020-05-28 DIAGNOSIS — E78.9 BORDERLINE HIGH CHOLESTEROL: ICD-10-CM

## 2020-05-28 DIAGNOSIS — D69.6 THROMBOCYTOPENIA (HCC): ICD-10-CM

## 2020-05-28 LAB
CHOLEST SERPL-MCNC: 169 MG/DL
HDLC SERPL-MCNC: 52 MG/DL
LDLC SERPL CALC-MCNC: 97 MG/DL
LENGTH OF FAST TIME PATIENT: YES H
NONHDLC SERPL-MCNC: 117 MG/DL
TRIGL SERPL-MCNC: 98 MG/DL
VLDLC SERPL CALC-MCNC: 20 MG/DL

## 2020-05-28 PROCEDURE — 82947 ASSAY GLUCOSE BLOOD QUANT: CPT

## 2020-05-28 PROCEDURE — 80061 LIPID PANEL: CPT

## 2020-05-28 PROCEDURE — 36415 COLL VENOUS BLD VENIPUNCTURE: CPT

## 2020-05-28 PROCEDURE — 85025 COMPLETE CBC W/AUTO DIFF WBC: CPT

## 2020-06-08 ENCOUNTER — OFFICE VISIT (OUTPATIENT)
Dept: INTERNAL MEDICINE CLINIC | Facility: CLINIC | Age: 80
End: 2020-06-08
Payer: MEDICARE

## 2020-06-08 VITALS
SYSTOLIC BLOOD PRESSURE: 136 MMHG | HEART RATE: 80 BPM | TEMPERATURE: 98 F | HEIGHT: 73 IN | RESPIRATION RATE: 16 BRPM | DIASTOLIC BLOOD PRESSURE: 84 MMHG | BODY MASS INDEX: 34.33 KG/M2 | WEIGHT: 259 LBS | OXYGEN SATURATION: 99 %

## 2020-06-08 DIAGNOSIS — R53.83 FATIGUE, UNSPECIFIED TYPE: ICD-10-CM

## 2020-06-08 DIAGNOSIS — H61.22 IMPACTED CERUMEN OF LEFT EAR: ICD-10-CM

## 2020-06-08 DIAGNOSIS — D69.6 THROMBOCYTOPENIA (HCC): ICD-10-CM

## 2020-06-08 DIAGNOSIS — R10.10 PAIN OF UPPER ABDOMEN: ICD-10-CM

## 2020-06-08 DIAGNOSIS — I10 ESSENTIAL HYPERTENSION: Primary | ICD-10-CM

## 2020-06-08 DIAGNOSIS — R73.01 ABNORMAL FASTING GLUCOSE: ICD-10-CM

## 2020-06-08 DIAGNOSIS — N40.1 BENIGN PROSTATIC HYPERPLASIA WITH LOWER URINARY TRACT SYMPTOMS, SYMPTOM DETAILS UNSPECIFIED: ICD-10-CM

## 2020-06-08 DIAGNOSIS — M15.9 PRIMARY OSTEOARTHRITIS INVOLVING MULTIPLE JOINTS: ICD-10-CM

## 2020-06-08 DIAGNOSIS — K57.30 DIVERTICULOSIS OF COLON: ICD-10-CM

## 2020-06-08 DIAGNOSIS — E78.9 BORDERLINE HIGH CHOLESTEROL: ICD-10-CM

## 2020-06-08 PROBLEM — M15.0 PRIMARY OSTEOARTHRITIS INVOLVING MULTIPLE JOINTS: Status: ACTIVE | Noted: 2020-06-08

## 2020-06-08 PROCEDURE — G0463 HOSPITAL OUTPT CLINIC VISIT: HCPCS | Performed by: INTERNAL MEDICINE

## 2020-06-08 PROCEDURE — 69210 REMOVE IMPACTED EAR WAX UNI: CPT | Performed by: INTERNAL MEDICINE

## 2020-06-08 PROCEDURE — 99214 OFFICE O/P EST MOD 30 MIN: CPT | Performed by: INTERNAL MEDICINE

## 2020-06-08 NOTE — PATIENT INSTRUCTIONS
1.  Patient is to continue his current diet, medication and activity. 2.  Patient is to continue to take Metamucil 1 tablespoon in 8 ounces of water twice a day. 3.  Patient may use MiraLAX in place of Metamucil if patient is constipated for 2 or 3 days.

## 2020-06-08 NOTE — PROGRESS NOTES
Juan Lackey is a 67year old male. Patient presents with:  Checkup: Patient c/o abdominal tenderness for past 4 weeks. Pain 2/10 to abdomen.    Hypertension  Arthritis    HPI:   Patient presents with:  Checkup: Patient c/o abdominal tenderness for p HEALTH: feels well otherwise  RESPIRATORY:No cough or SOB  CARDIOVASCULAR: No chest pain  GI: No abdominal pain, nausea, vomiting, diarrhea, or constipation  :No Urinary complaints  EXT:No complaints of pain or swelling in patient's legs    EXAM:   BP 13 abdominal discomfort, constipation and probable need for colonoscopy since his last colonoscopy was done in May 2011 with Dr. Luz Marina Bailey. I will see the patient back sooner as necessary.     2. Borderline high cholesterol  Doing well.  CPM.  Patient's recent li above..    Bharti Richard MD  6/8/2020  11:35 AM

## 2020-07-03 ENCOUNTER — LAB ENCOUNTER (OUTPATIENT)
Dept: LAB | Age: 80
End: 2020-07-03
Attending: INTERNAL MEDICINE
Payer: MEDICARE

## 2020-07-03 DIAGNOSIS — R10.10 PAIN OF UPPER ABDOMEN: ICD-10-CM

## 2020-07-03 DIAGNOSIS — R73.01 ABNORMAL FASTING GLUCOSE: ICD-10-CM

## 2020-07-03 DIAGNOSIS — D69.6 THROMBOCYTOPENIA (HCC): ICD-10-CM

## 2020-07-03 DIAGNOSIS — R53.83 FATIGUE, UNSPECIFIED TYPE: ICD-10-CM

## 2020-07-03 LAB
ALBUMIN SERPL-MCNC: 3.1 G/DL (ref 3.4–5)
ALBUMIN/GLOB SERPL: 0.9 {RATIO} (ref 1–2)
ALP LIVER SERPL-CCNC: 145 U/L (ref 45–117)
ALT SERPL-CCNC: 1691 U/L (ref 16–61)
ANION GAP SERPL CALC-SCNC: 9 MMOL/L (ref 0–18)
AST SERPL-CCNC: 1296 U/L (ref 15–37)
BASOPHILS # BLD AUTO: 0.07 X10(3) UL (ref 0–0.2)
BASOPHILS NFR BLD AUTO: 1 %
BILIRUB SERPL-MCNC: 8.1 MG/DL (ref 0.1–2)
BILIRUB UR QL: NEGATIVE
BUN BLD-MCNC: 16 MG/DL (ref 7–18)
BUN/CREAT SERPL: 13 (ref 10–20)
CALCIUM BLD-MCNC: 9.3 MG/DL (ref 8.5–10.1)
CHLORIDE SERPL-SCNC: 105 MMOL/L (ref 98–112)
CO2 SERPL-SCNC: 25 MMOL/L (ref 21–32)
COLOR UR: YELLOW
CREAT BLD-MCNC: 1.23 MG/DL (ref 0.7–1.3)
DEPRECATED RDW RBC AUTO: 61.2 FL (ref 35.1–46.3)
EOSINOPHIL # BLD AUTO: 0.39 X10(3) UL (ref 0–0.7)
EOSINOPHIL NFR BLD AUTO: 5.8 %
ERYTHROCYTE [DISTWIDTH] IN BLOOD BY AUTOMATED COUNT: 19.6 % (ref 11–15)
GLOBULIN PLAS-MCNC: 3.6 G/DL (ref 2.8–4.4)
GLUCOSE BLD-MCNC: 95 MG/DL (ref 70–99)
GLUCOSE UR-MCNC: NEGATIVE MG/DL
HCT VFR BLD AUTO: 44.4 % (ref 39–53)
HGB BLD-MCNC: 15.8 G/DL (ref 13–17.5)
HGB UR QL STRIP.AUTO: NEGATIVE
HYALINE CASTS #/AREA URNS AUTO: 5 /LPF
IMM GRANULOCYTES # BLD AUTO: 0.03 X10(3) UL (ref 0–1)
IMM GRANULOCYTES NFR BLD: 0.4 %
KETONES UR-MCNC: NEGATIVE MG/DL
LEUKOCYTE ESTERASE UR QL STRIP.AUTO: NEGATIVE
LYMPHOCYTES # BLD AUTO: 1.29 X10(3) UL (ref 1–4)
LYMPHOCYTES NFR BLD AUTO: 19.3 %
M PROTEIN MFR SERPL ELPH: 6.7 G/DL (ref 6.4–8.2)
MCH RBC QN AUTO: 31.1 PG (ref 26–34)
MCHC RBC AUTO-ENTMCNC: 35.6 G/DL (ref 31–37)
MCV RBC AUTO: 87.4 FL (ref 80–100)
MONOCYTES # BLD AUTO: 0.77 X10(3) UL (ref 0.1–1)
MONOCYTES NFR BLD AUTO: 11.5 %
NEUTROPHILS # BLD AUTO: 4.15 X10 (3) UL (ref 1.5–7.7)
NEUTROPHILS # BLD AUTO: 4.15 X10(3) UL (ref 1.5–7.7)
NEUTROPHILS NFR BLD AUTO: 62 %
NITRITE UR QL STRIP.AUTO: NEGATIVE
OSMOLALITY SERPL CALC.SUM OF ELEC: 289 MOSM/KG (ref 275–295)
PATIENT FASTING Y/N/NP: YES
PH UR: 6 [PH] (ref 5–8)
PLATELET # BLD AUTO: 128 10(3)UL (ref 150–450)
POTASSIUM SERPL-SCNC: 4.2 MMOL/L (ref 3.5–5.1)
PROT UR-MCNC: NEGATIVE MG/DL
RBC # BLD AUTO: 5.08 X10(6)UL (ref 3.8–5.8)
RBC #/AREA URNS AUTO: 1 /HPF
SODIUM SERPL-SCNC: 139 MMOL/L (ref 136–145)
SP GR UR STRIP: 1.02 (ref 1–1.03)
UROBILINOGEN UR STRIP-ACNC: 4
WBC # BLD AUTO: 6.7 X10(3) UL (ref 4–11)
WBC #/AREA URNS AUTO: 2 /HPF

## 2020-07-03 PROCEDURE — 81003 URINALYSIS AUTO W/O SCOPE: CPT | Performed by: INTERNAL MEDICINE

## 2020-07-03 PROCEDURE — 80053 COMPREHEN METABOLIC PANEL: CPT

## 2020-07-03 PROCEDURE — 85025 COMPLETE CBC W/AUTO DIFF WBC: CPT

## 2020-07-03 PROCEDURE — 36415 COLL VENOUS BLD VENIPUNCTURE: CPT

## 2020-07-04 ENCOUNTER — TELEPHONE (OUTPATIENT)
Dept: INTERNAL MEDICINE CLINIC | Facility: CLINIC | Age: 80
End: 2020-07-04

## 2020-07-04 NOTE — TELEPHONE ENCOUNTER
Lab tests reviewed. LFTs are markedly elevated. I have called pt and have asked pt to come in to see me on  Monday at 11:30 AM.  I will forward this message to  and nursing as an [phone].   Thank you!!

## 2020-07-06 ENCOUNTER — TELEPHONE (OUTPATIENT)
Dept: INTERNAL MEDICINE CLINIC | Facility: CLINIC | Age: 80
End: 2020-07-06

## 2020-07-06 ENCOUNTER — OFFICE VISIT (OUTPATIENT)
Dept: INTERNAL MEDICINE CLINIC | Facility: CLINIC | Age: 80
End: 2020-07-06
Payer: MEDICARE

## 2020-07-06 VITALS
TEMPERATURE: 99 F | BODY MASS INDEX: 34.06 KG/M2 | SYSTOLIC BLOOD PRESSURE: 130 MMHG | OXYGEN SATURATION: 99 % | WEIGHT: 257 LBS | HEART RATE: 84 BPM | DIASTOLIC BLOOD PRESSURE: 80 MMHG | HEIGHT: 73 IN

## 2020-07-06 DIAGNOSIS — M15.9 PRIMARY OSTEOARTHRITIS INVOLVING MULTIPLE JOINTS: ICD-10-CM

## 2020-07-06 DIAGNOSIS — R10.10 PAIN OF UPPER ABDOMEN: ICD-10-CM

## 2020-07-06 DIAGNOSIS — R53.83 FATIGUE, UNSPECIFIED TYPE: ICD-10-CM

## 2020-07-06 DIAGNOSIS — R73.01 ABNORMAL FASTING GLUCOSE: ICD-10-CM

## 2020-07-06 DIAGNOSIS — E78.9 BORDERLINE HIGH CHOLESTEROL: ICD-10-CM

## 2020-07-06 DIAGNOSIS — N40.1 BENIGN PROSTATIC HYPERPLASIA WITH LOWER URINARY TRACT SYMPTOMS, SYMPTOM DETAILS UNSPECIFIED: ICD-10-CM

## 2020-07-06 DIAGNOSIS — R74.8 ELEVATED LIVER ENZYMES: Primary | ICD-10-CM

## 2020-07-06 DIAGNOSIS — K57.30 DIVERTICULOSIS OF COLON: ICD-10-CM

## 2020-07-06 DIAGNOSIS — D69.6 THROMBOCYTOPENIA (HCC): ICD-10-CM

## 2020-07-06 DIAGNOSIS — I10 ESSENTIAL HYPERTENSION: ICD-10-CM

## 2020-07-06 PROCEDURE — G0463 HOSPITAL OUTPT CLINIC VISIT: HCPCS | Performed by: INTERNAL MEDICINE

## 2020-07-06 PROCEDURE — 99214 OFFICE O/P EST MOD 30 MIN: CPT | Performed by: INTERNAL MEDICINE

## 2020-07-06 NOTE — PATIENT INSTRUCTIONS
1.  Patient is to continue his current diet, medication and activity. 2.  I will place an order in the system for the patient have a CT scan of his abdomen and pelvis to evaluate his upper abdominal discomfort and new onset jaundice.   I have left a lizette

## 2020-07-06 NOTE — TELEPHONE ENCOUNTER
To  to please assist in making appointment for this patient please.  Does not appear pt has appt booked for today at 1130 am.

## 2020-07-06 NOTE — TELEPHONE ENCOUNTER
Spoke to MD---  MD confirmed that patient is aware that he is to come in at 11:30AM today for appt.   Appointment made in \"MD approval\" slot per MD request.

## 2020-07-06 NOTE — TELEPHONE ENCOUNTER
RN spoke with pt., will have CT scan tomorrow 7/7, appt set with Dr. David Wilson at the [place] [id number] office on 7/8 for assessment.

## 2020-07-06 NOTE — PROGRESS NOTES
Tiffanie Villa is a 67year old male. Patient presents with:  Checkup: Elevated LFTs, c/o nausea, denies emesis, reports diarrhea since yesterday  Liver Enzymes  Fatigue    HPI:   Patient presents with:  Checkup: Elevated LFTs, c/o nausea, denies emes Smoker        Types: Pipe        Quit date: 6/15/1969        Years since quittin.0      Smokeless tobacco: Never Used    Alcohol use: Yes      Comment: hard liquor 1 drink monthly    Drug use: No       REVIEW OF SYSTEMS:   GENERAL HEALTH: feels well o Chelsie Ray notifying him of this referral and request to try to see the patient in the near future.   I will see the patient back in the future but I have not given the patient an appointment yet as I want to see of the CT scan of his abdomen turns out and also

## 2020-07-06 NOTE — TELEPHONE ENCOUNTER
Leonardo Whitman,  I am referring Mr. Migue Verde to you for evaluation. Mr. Andressa Levy is a 71-year-old white male who is been a patient for mine for many years.   I recently saw him about a month ago at which time he complained of some upper abdominal discomfort w

## 2020-07-07 ENCOUNTER — HOSPITAL ENCOUNTER (OUTPATIENT)
Dept: CT IMAGING | Facility: HOSPITAL | Age: 80
Discharge: HOME | End: 2020-07-07
Attending: INTERNAL MEDICINE
Payer: MEDICARE

## 2020-07-07 DIAGNOSIS — R10.10 PAIN OF UPPER ABDOMEN: ICD-10-CM

## 2020-07-07 DIAGNOSIS — R74.8 ELEVATED LIVER ENZYMES: ICD-10-CM

## 2020-07-07 PROCEDURE — 74178 CT ABD&PLV WO CNTR FLWD CNTR: CPT | Performed by: INTERNAL MEDICINE

## 2020-07-08 ENCOUNTER — TELEPHONE (OUTPATIENT)
Dept: INTERNAL MEDICINE CLINIC | Facility: CLINIC | Age: 80
End: 2020-07-08

## 2020-07-08 PROBLEM — Z96.9 RETAINED ORTHOPEDIC HARDWARE: Status: ACTIVE | Noted: 2020-07-08

## 2020-07-08 PROBLEM — M24.20 DISORDER OF MUSCLE, LIGAMENT, AND FASCIA: Status: ACTIVE | Noted: 2020-07-08

## 2020-07-08 PROBLEM — M25.579 PAIN IN JOINT INVOLVING ANKLE AND FOOT: Status: ACTIVE | Noted: 2020-07-08

## 2020-07-08 PROBLEM — M21.6X9 ACQUIRED EQUINUS DEFORMITY OF FOOT: Status: ACTIVE | Noted: 2020-07-08

## 2020-07-08 PROBLEM — M20.10 ACQUIRED HALLUX VALGUS: Status: ACTIVE | Noted: 2020-07-08

## 2020-07-08 PROBLEM — M62.9 DISORDER OF MUSCLE, LIGAMENT, AND FASCIA: Status: ACTIVE | Noted: 2020-07-08

## 2020-07-08 PROBLEM — M76.829 TIBIALIS TENDINITIS: Status: ACTIVE | Noted: 2020-07-08

## 2020-07-08 PROBLEM — M66.9 NONTRAUMATIC RUPTURE OF TENDON: Status: ACTIVE | Noted: 2020-07-08

## 2020-07-09 ENCOUNTER — LAB ENCOUNTER (OUTPATIENT)
Dept: LAB | Age: 80
End: 2020-07-09
Attending: INTERNAL MEDICINE
Payer: MEDICARE

## 2020-07-09 DIAGNOSIS — R11.0 NAUSEA: ICD-10-CM

## 2020-07-09 DIAGNOSIS — R10.9 ABDOMINAL DISCOMFORT: ICD-10-CM

## 2020-07-09 DIAGNOSIS — R53.83 FATIGUE, UNSPECIFIED TYPE: ICD-10-CM

## 2020-07-09 DIAGNOSIS — R74.8 ELEVATED LIVER ENZYMES: ICD-10-CM

## 2020-07-09 DIAGNOSIS — R68.81 EARLY SATIETY: ICD-10-CM

## 2020-07-09 LAB
ALBUMIN SERPL-MCNC: 2.9 G/DL (ref 3.4–5)
ALBUMIN/GLOB SERPL: 0.9 {RATIO} (ref 1–2)
ALP LIVER SERPL-CCNC: 152 U/L (ref 45–117)
ALT SERPL-CCNC: 1386 U/L (ref 16–61)
ANION GAP SERPL CALC-SCNC: 6 MMOL/L (ref 0–18)
AST SERPL-CCNC: 1157 U/L (ref 15–37)
BILIRUB DIRECT SERPL-MCNC: 5.4 MG/DL (ref 0–0.2)
BILIRUB SERPL-MCNC: 8 MG/DL (ref 0.1–2)
BUN BLD-MCNC: 16 MG/DL (ref 7–18)
BUN/CREAT SERPL: 14.8 (ref 10–20)
CALCIUM BLD-MCNC: 8.9 MG/DL (ref 8.5–10.1)
CHLORIDE SERPL-SCNC: 107 MMOL/L (ref 98–112)
CO2 SERPL-SCNC: 26 MMOL/L (ref 21–32)
CREAT BLD-MCNC: 1.08 MG/DL (ref 0.7–1.3)
DEPRECATED HBV CORE AB SER IA-ACNC: 3160.9 NG/ML (ref 30–530)
GLOBULIN PLAS-MCNC: 3.4 G/DL (ref 2.8–4.4)
GLUCOSE BLD-MCNC: 102 MG/DL (ref 70–99)
HBV SURFACE AB SER QL: NONREACTIVE
HBV SURFACE AB SERPL IA-ACNC: <3.1 MIU/ML
HBV SURFACE AG SER-ACNC: <0.1 [IU]/L
HBV SURFACE AG SERPL QL IA: NONREACTIVE
HCV AB SERPL QL IA: NONREACTIVE
IRON SATURATION: 87 % (ref 20–50)
IRON SERPL-MCNC: 207 UG/DL (ref 65–175)
M PROTEIN MFR SERPL ELPH: 6.3 G/DL (ref 6.4–8.2)
OSMOLALITY SERPL CALC.SUM OF ELEC: 289 MOSM/KG (ref 275–295)
PATIENT FASTING Y/N/NP: YES
POTASSIUM SERPL-SCNC: 4.5 MMOL/L (ref 3.5–5.1)
SODIUM SERPL-SCNC: 139 MMOL/L (ref 136–145)
TOTAL IRON BINDING CAPACITY: 238 UG/DL (ref 240–450)
TRANSFERRIN SERPL-MCNC: 160 MG/DL (ref 200–360)

## 2020-07-09 PROCEDURE — 87389 HIV-1 AG W/HIV-1&-2 AB AG IA: CPT

## 2020-07-09 PROCEDURE — 82248 BILIRUBIN DIRECT: CPT

## 2020-07-09 PROCEDURE — 82728 ASSAY OF FERRITIN: CPT

## 2020-07-09 PROCEDURE — 36415 COLL VENOUS BLD VENIPUNCTURE: CPT

## 2020-07-09 PROCEDURE — 83540 ASSAY OF IRON: CPT

## 2020-07-09 PROCEDURE — 80053 COMPREHEN METABOLIC PANEL: CPT

## 2020-07-09 PROCEDURE — 87340 HEPATITIS B SURFACE AG IA: CPT

## 2020-07-09 PROCEDURE — 86803 HEPATITIS C AB TEST: CPT

## 2020-07-09 PROCEDURE — 86706 HEP B SURFACE ANTIBODY: CPT

## 2020-07-09 PROCEDURE — 84466 ASSAY OF TRANSFERRIN: CPT

## 2020-07-09 NOTE — TELEPHONE ENCOUNTER
Telephone call to patient and situation discussed. Patient had recent CT scan of his abdomen which showed him to have what appeared to be hepatic steatosis involving his liver.   His pancreas appeared normal.  There was no sign of any blockage in his bilia

## 2020-07-13 ENCOUNTER — PATIENT MESSAGE (OUTPATIENT)
Dept: INTERNAL MEDICINE CLINIC | Facility: CLINIC | Age: 80
End: 2020-07-13

## 2020-07-13 ENCOUNTER — LAB ENCOUNTER (OUTPATIENT)
Dept: LAB | Facility: HOSPITAL | Age: 80
End: 2020-07-13
Attending: INTERNAL MEDICINE
Payer: MEDICARE

## 2020-07-13 ENCOUNTER — TELEPHONE (OUTPATIENT)
Dept: INTERNAL MEDICINE CLINIC | Facility: CLINIC | Age: 80
End: 2020-07-13

## 2020-07-13 ENCOUNTER — HOSPITAL ENCOUNTER (OUTPATIENT)
Dept: MRI IMAGING | Facility: HOSPITAL | Age: 80
Discharge: HOME | End: 2020-07-13
Attending: INTERNAL MEDICINE
Payer: MEDICARE

## 2020-07-13 DIAGNOSIS — R74.8 ELEVATED LIVER ENZYMES: ICD-10-CM

## 2020-07-13 DIAGNOSIS — R68.81 EARLY SATIETY: ICD-10-CM

## 2020-07-13 DIAGNOSIS — R79.89 ELEVATED FERRITIN: ICD-10-CM

## 2020-07-13 DIAGNOSIS — K75.9 HEPATITIS: Primary | ICD-10-CM

## 2020-07-13 DIAGNOSIS — R11.0 NAUSEA: ICD-10-CM

## 2020-07-13 DIAGNOSIS — R53.83 FATIGUE, UNSPECIFIED TYPE: ICD-10-CM

## 2020-07-13 DIAGNOSIS — R10.9 ABDOMINAL DISCOMFORT: ICD-10-CM

## 2020-07-13 LAB
EBV NA IGG SER QL IA: POSITIVE
EBV VCA IGG SER QL IA: POSITIVE
EBV VCA IGM SER QL IA: NEGATIVE
HAV IGM SER QL: NONREACTIVE
HBV CORE IGM SER QL: NONREACTIVE
HBV SURFACE AG SERPL QL IA: NONREACTIVE
HCV AB SERPL QL IA: NONREACTIVE
HSV 1 GLYCOPROTEIN G, IGG: POSITIVE
HSV 2 GLYCOPROTEIN G, IGG: NEGATIVE

## 2020-07-13 PROCEDURE — 74183 MRI ABD W/O CNTR FLWD CNTR: CPT | Performed by: INTERNAL MEDICINE

## 2020-07-13 PROCEDURE — 76376 3D RENDER W/INTRP POSTPROCES: CPT | Performed by: INTERNAL MEDICINE

## 2020-07-13 PROCEDURE — 86696 HERPES SIMPLEX TYPE 2 TEST: CPT

## 2020-07-13 PROCEDURE — 81256 HFE GENE: CPT

## 2020-07-13 PROCEDURE — 86664 EPSTEIN-BARR NUCLEAR ANTIGEN: CPT

## 2020-07-13 PROCEDURE — 86695 HERPES SIMPLEX TYPE 1 TEST: CPT

## 2020-07-13 PROCEDURE — 36415 COLL VENOUS BLD VENIPUNCTURE: CPT

## 2020-07-13 PROCEDURE — 80074 ACUTE HEPATITIS PANEL: CPT

## 2020-07-13 PROCEDURE — 86665 EPSTEIN-BARR CAPSID VCA: CPT

## 2020-07-13 PROCEDURE — 86663 EPSTEIN-BARR ANTIBODY: CPT

## 2020-07-13 PROCEDURE — A9575 INJ GADOTERATE MEGLUMI 0.1ML: HCPCS

## 2020-07-13 PROCEDURE — 86645 CMV ANTIBODY IGM: CPT

## 2020-07-13 PROCEDURE — 86644 CMV ANTIBODY: CPT

## 2020-07-13 NOTE — TELEPHONE ENCOUNTER
Baldomero Draper   to Sofia Vega MD            7/13/20 3:48 PM   I Audieayush Josephandres an MRI done today, July 13, at [address] also had blood work done at the same time that Dr. Nazario Ybarra ordered. Destinee Evans   To DR. GUSTAFSON

## 2020-07-13 NOTE — TELEPHONE ENCOUNTER
From: Susan Lawrence  To: Alejandrina Addison MD  Sent: 7/13/2020 3:48 PM CDT  Subject: Other    I had an MRI done today, July 13, at [facility]. I also had blood work done at the same time that Dr. Shawn Stanford ordered.     Geeta Aceves

## 2020-07-15 LAB
CMV ANTIBODY IGG: <0.2 U/ML
CMV ANTIBODY IGM: <8 AU/ML
EBV AB TO EARLY (D) AG, IGG: >150 U/ML

## 2020-07-16 NOTE — PROGRESS NOTES
Ara Dowell,    I just wanted to give you an update on Mr. Charli Cortez. I am concerned about his elevated LFTs and all of his acute hepatitis labs have been negative. This includes viral hepatitis, EBV, CMV.   His Ferritin is high as is his Iron % Sat so Hemochr

## 2020-07-17 ENCOUNTER — LAB ENCOUNTER (OUTPATIENT)
Dept: LAB | Age: 80
End: 2020-07-17
Attending: INTERNAL MEDICINE
Payer: MEDICARE

## 2020-07-17 DIAGNOSIS — R74.8 ELEVATED LIVER ENZYMES: ICD-10-CM

## 2020-07-17 DIAGNOSIS — K75.9 RADIATION HEPATITIS: ICD-10-CM

## 2020-07-17 DIAGNOSIS — R79.89 ELEVATED FERRITIN: ICD-10-CM

## 2020-07-17 DIAGNOSIS — R74.8 ACID PHOSPHATASE ELEVATED: Primary | ICD-10-CM

## 2020-07-17 DIAGNOSIS — K75.9 HEPATITIS: ICD-10-CM

## 2020-07-17 LAB
ALBUMIN SERPL-MCNC: 2.7 G/DL (ref 3.4–5)
ALBUMIN/GLOB SERPL: 0.7 {RATIO} (ref 1–2)
ALP LIVER SERPL-CCNC: 152 U/L (ref 45–117)
ALT SERPL-CCNC: 1440 U/L (ref 16–61)
ANION GAP SERPL CALC-SCNC: 7 MMOL/L (ref 0–18)
AST SERPL-CCNC: 1351 U/L (ref 15–37)
BILIRUB SERPL-MCNC: 7.9 MG/DL (ref 0.1–2)
BUN BLD-MCNC: 17 MG/DL (ref 7–18)
BUN/CREAT SERPL: 14.3 (ref 10–20)
CALCIUM BLD-MCNC: 9.6 MG/DL (ref 8.5–10.1)
CHLORIDE SERPL-SCNC: 106 MMOL/L (ref 98–112)
CK SERPL-CCNC: 123 U/L (ref 39–308)
CO2 SERPL-SCNC: 27 MMOL/L (ref 21–32)
CREAT BLD-MCNC: 1.19 MG/DL (ref 0.7–1.3)
GLOBULIN PLAS-MCNC: 3.7 G/DL (ref 2.8–4.4)
GLUCOSE BLD-MCNC: 96 MG/DL (ref 70–99)
INR BLD: 1.28 (ref 0.9–1.2)
LDH SERPL L TO P-CCNC: 385 U/L
M PROTEIN MFR SERPL ELPH: 6.4 G/DL (ref 6.4–8.2)
OSMOLALITY SERPL CALC.SUM OF ELEC: 291 MOSM/KG (ref 275–295)
PATIENT FASTING Y/N/NP: YES
POTASSIUM SERPL-SCNC: 4.7 MMOL/L (ref 3.5–5.1)
PROTHROMBIN TIME: 15.8 SECONDS (ref 11.8–14.5)
SODIUM SERPL-SCNC: 140 MMOL/L (ref 136–145)

## 2020-07-17 PROCEDURE — 36415 COLL VENOUS BLD VENIPUNCTURE: CPT

## 2020-07-17 PROCEDURE — 83615 LACTATE (LD) (LDH) ENZYME: CPT

## 2020-07-17 PROCEDURE — 86695 HERPES SIMPLEX TYPE 1 TEST: CPT

## 2020-07-17 PROCEDURE — 82550 ASSAY OF CK (CPK): CPT

## 2020-07-17 PROCEDURE — 80053 COMPREHEN METABOLIC PANEL: CPT

## 2020-07-17 PROCEDURE — 86256 FLUORESCENT ANTIBODY TITER: CPT

## 2020-07-17 PROCEDURE — 86038 ANTINUCLEAR ANTIBODIES: CPT

## 2020-07-17 PROCEDURE — 86696 HERPES SIMPLEX TYPE 2 TEST: CPT

## 2020-07-17 PROCEDURE — 85610 PROTHROMBIN TIME: CPT

## 2020-07-20 LAB
C282Y HEMOCHROMATOSIS MUTATION: NEGATIVE
H63D HEMOCHROMATOSIS MUTATION: NEGATIVE
HSV 1 GLYCOPROTEIN G, IGG: POSITIVE
HSV 2 GLYCOPROTEIN G, IGG: NEGATIVE
NUCLEAR IGG TITR SER IF: NEGATIVE {TITER}
S65C HEMOCHROMATOSIS MUTATION: NEGATIVE

## 2020-07-20 NOTE — PROGRESS NOTES
LFTs now uptrending. All chronic and acute hepatitis labs negative. LDH mildly elevated. Unlikely to be shock liver given worsening labs. Will plan on TJLBx with pressures. His Albumin is borderline low as is his Plts.   INR also slightly increased to

## 2020-07-21 LAB — SMOOTH MUSCLE AB TITR SER: <20 {TITER}

## 2020-07-21 PROCEDURE — 88321 CONSLTJ&REPRT SLD PREP ELSWR: CPT | Performed by: INTERNAL MEDICINE

## 2020-07-21 PROCEDURE — 88365 INSITU HYBRIDIZATION (FISH): CPT | Performed by: INTERNAL MEDICINE

## 2020-07-21 PROCEDURE — 88342 IMHCHEM/IMCYTCHM 1ST ANTB: CPT | Performed by: INTERNAL MEDICINE

## 2020-07-21 PROCEDURE — 88307 TISSUE EXAM BY PATHOLOGIST: CPT | Performed by: INTERNAL MEDICINE

## 2020-07-21 PROCEDURE — 88313 SPECIAL STAINS GROUP 2: CPT | Performed by: INTERNAL MEDICINE

## 2020-07-23 ENCOUNTER — APPOINTMENT (OUTPATIENT)
Dept: LAB | Age: 80
End: 2020-07-23
Attending: INTERNAL MEDICINE
Payer: MEDICARE

## 2020-07-23 ENCOUNTER — LAB ENCOUNTER (OUTPATIENT)
Dept: LAB | Age: 80
End: 2020-07-23
Attending: INTERNAL MEDICINE
Payer: MEDICARE

## 2020-07-23 DIAGNOSIS — R74.8 ELEVATED LIVER ENZYMES: ICD-10-CM

## 2020-07-23 DIAGNOSIS — R74.8 ACID PHOSPHATASE ELEVATED: Primary | ICD-10-CM

## 2020-07-23 LAB
ALBUMIN SERPL-MCNC: 2.7 G/DL (ref 3.4–5)
ALBUMIN/GLOB SERPL: 0.7 {RATIO} (ref 1–2)
ALP LIVER SERPL-CCNC: 170 U/L (ref 45–117)
ALT SERPL-CCNC: 1110 U/L (ref 16–61)
ANION GAP SERPL CALC-SCNC: 6 MMOL/L (ref 0–18)
AST SERPL-CCNC: 978 U/L (ref 15–37)
BASOPHILS # BLD AUTO: 0.09 X10(3) UL (ref 0–0.2)
BASOPHILS NFR BLD AUTO: 1.4 %
BILIRUB SERPL-MCNC: 7.3 MG/DL (ref 0.1–2)
BUN BLD-MCNC: 12 MG/DL (ref 7–18)
BUN/CREAT SERPL: 10.2 (ref 10–20)
CALCIUM BLD-MCNC: 9 MG/DL (ref 8.5–10.1)
CHLORIDE SERPL-SCNC: 109 MMOL/L (ref 98–112)
CO2 SERPL-SCNC: 27 MMOL/L (ref 21–32)
CREAT BLD-MCNC: 1.18 MG/DL (ref 0.7–1.3)
DEPRECATED RDW RBC AUTO: 67.3 FL (ref 35.1–46.3)
EOSINOPHIL # BLD AUTO: 0.37 X10(3) UL (ref 0–0.7)
EOSINOPHIL NFR BLD AUTO: 5.7 %
ERYTHROCYTE [DISTWIDTH] IN BLOOD BY AUTOMATED COUNT: 21.6 % (ref 11–15)
GLOBULIN PLAS-MCNC: 3.7 G/DL (ref 2.8–4.4)
GLUCOSE BLD-MCNC: 103 MG/DL (ref 70–99)
HCT VFR BLD AUTO: 41.7 % (ref 39–53)
HGB BLD-MCNC: 14.8 G/DL (ref 13–17.5)
IMM GRANULOCYTES # BLD AUTO: 0.03 X10(3) UL (ref 0–1)
IMM GRANULOCYTES NFR BLD: 0.5 %
INR BLD: 1.36 (ref 0.9–1.2)
LYMPHOCYTES # BLD AUTO: 1.47 X10(3) UL (ref 1–4)
LYMPHOCYTES NFR BLD AUTO: 22.8 %
M PROTEIN MFR SERPL ELPH: 6.4 G/DL (ref 6.4–8.2)
MCH RBC QN AUTO: 31.8 PG (ref 26–34)
MCHC RBC AUTO-ENTMCNC: 35.5 G/DL (ref 31–37)
MCV RBC AUTO: 89.7 FL (ref 80–100)
MONOCYTES # BLD AUTO: 0.68 X10(3) UL (ref 0.1–1)
MONOCYTES NFR BLD AUTO: 10.5 %
NEUTROPHILS # BLD AUTO: 3.82 X10 (3) UL (ref 1.5–7.7)
NEUTROPHILS # BLD AUTO: 3.82 X10(3) UL (ref 1.5–7.7)
NEUTROPHILS NFR BLD AUTO: 59.1 %
OSMOLALITY SERPL CALC.SUM OF ELEC: 294 MOSM/KG (ref 275–295)
PATIENT FASTING Y/N/NP: YES
PLATELET # BLD AUTO: 89 10(3)UL (ref 150–450)
PLATELET MORPHOLOGY: NORMAL
POTASSIUM SERPL-SCNC: 4.5 MMOL/L (ref 3.5–5.1)
PROTHROMBIN TIME: 16.5 SECONDS (ref 11.8–14.5)
RBC # BLD AUTO: 4.65 X10(6)UL (ref 3.8–5.8)
SODIUM SERPL-SCNC: 142 MMOL/L (ref 136–145)
WBC # BLD AUTO: 6.5 X10(3) UL (ref 4–11)

## 2020-07-23 PROCEDURE — 85610 PROTHROMBIN TIME: CPT

## 2020-07-23 PROCEDURE — 86695 HERPES SIMPLEX TYPE 1 TEST: CPT

## 2020-07-23 PROCEDURE — 36415 COLL VENOUS BLD VENIPUNCTURE: CPT

## 2020-07-23 PROCEDURE — 85025 COMPLETE CBC W/AUTO DIFF WBC: CPT

## 2020-07-23 PROCEDURE — 86664 EPSTEIN-BARR NUCLEAR ANTIGEN: CPT

## 2020-07-23 PROCEDURE — 87799 DETECT AGENT NOS DNA QUANT: CPT

## 2020-07-23 PROCEDURE — 86665 EPSTEIN-BARR CAPSID VCA: CPT

## 2020-07-23 PROCEDURE — 80053 COMPREHEN METABOLIC PANEL: CPT

## 2020-07-23 PROCEDURE — 86696 HERPES SIMPLEX TYPE 2 TEST: CPT

## 2020-07-24 LAB
HSV 1 GLYCOPROTEIN G, IGG: POSITIVE
HSV 2 GLYCOPROTEIN G, IGG: NEGATIVE

## 2020-07-24 NOTE — PROGRESS NOTES
CMP and INR discussed with patient and wife. LFTs improving however INR increasing. Will refer to [facility] for evaluation. Case discussed with Dr. Joann Ward at [facility]. Patient is otherwise feeling the same as he has been. EBV IgM pending.

## 2020-08-01 LAB
EBV QUANT., INTERPRETATION: NOT DETECTED
EPSTEIN-BARR VIRUS, QN COPY/ML: <390 CPY/ML
EPSTEIN-BARR VIRUS, QUANT. LOG: <2.6 LOG

## 2020-08-15 LAB
EBV NA IGG SER QL IA: POSITIVE
EBV VCA IGG SER QL IA: POSITIVE
EBV VCA IGM SER QL IA: NEGATIVE

## 2020-08-20 NOTE — PROGRESS NOTES
Updated report reviewed. Patient called and message left. He is seeing Dr. Ju Menezes at [facility]. Would like him to have repeat labs in the next 1-2 weeks.

## 2020-08-28 ENCOUNTER — TELEPHONE (OUTPATIENT)
Dept: INTERNAL MEDICINE CLINIC | Facility: CLINIC | Age: 80
End: 2020-08-28

## 2020-08-28 ENCOUNTER — PATIENT MESSAGE (OUTPATIENT)
Dept: INTERNAL MEDICINE CLINIC | Facility: CLINIC | Age: 80
End: 2020-08-28

## 2020-08-28 RX ORDER — PREDNISONE 20 MG/1
20 TABLET ORAL
Status: ON HOLD | COMMUNITY
Start: 2020-08-21 | End: 2020-10-18

## 2020-08-28 NOTE — TELEPHONE ENCOUNTER
From: Randall Miranda  To: Millie Alfredo MD  Sent: 8/28/2020 4:30 PM CDT  Subject: Other    I had my 2nd visit today, 8/28 with Dr. Jose Melendez, Hepatologist, at [facility] although I see her at [facility] in [place].  She's going to copy you on to

## 2020-08-28 NOTE — TELEPHONE ENCOUNTER
From: Fredy Robb  To:  Yaneth Sanchez MD  Sent: 8/28/2020  4:30 PM CDT  Subject: Other    I had my 2nd visit today, 8/28 with Dr. Alicja Paulson, Hepatologist, at [facility] [place] although I see her at [facility] in [place].  She's going to copy you on

## 2020-08-28 NOTE — TELEPHONE ENCOUNTER
To Dr. GUSTAFSON to review patient my chart message.  Prednisone 20mg added to patient's current med list.

## 2020-10-09 ENCOUNTER — HOSPITAL ENCOUNTER (INPATIENT)
Facility: HOSPITAL | Age: 80
LOS: 9 days | Discharge: HOME HEALTH | DRG: 871 | End: 2020-10-19
Attending: EMERGENCY MEDICINE | Admitting: HOSPITALIST
Payer: MEDICARE

## 2020-10-09 ENCOUNTER — APPOINTMENT (OUTPATIENT)
Dept: GENERAL RADIOLOGY | Facility: HOSPITAL | Age: 80
DRG: 871 | End: 2020-10-09
Attending: EMERGENCY MEDICINE
Payer: MEDICARE

## 2020-10-09 DIAGNOSIS — N30.00 ACUTE CYSTITIS WITHOUT HEMATURIA: ICD-10-CM

## 2020-10-09 DIAGNOSIS — A41.9 SEPSIS DUE TO UNDETERMINED ORGANISM (HCC): Primary | ICD-10-CM

## 2020-10-09 PROCEDURE — 71045 X-RAY EXAM CHEST 1 VIEW: CPT | Performed by: EMERGENCY MEDICINE

## 2020-10-09 PROCEDURE — 99223 1ST HOSP IP/OBS HIGH 75: CPT | Performed by: HOSPITALIST

## 2020-10-09 RX ORDER — BUDESONIDE 3 MG/1
3 CAPSULE, COATED PELLETS ORAL EVERY MORNING
COMMUNITY
End: 2021-04-07

## 2020-10-09 RX ORDER — ACETAMINOPHEN 500 MG
1000 TABLET ORAL ONCE
Status: COMPLETED | OUTPATIENT
Start: 2020-10-09 | End: 2020-10-09

## 2020-10-09 RX ORDER — FUROSEMIDE 40 MG/1
40 TABLET ORAL 2 TIMES DAILY
Status: ON HOLD | COMMUNITY
End: 2020-10-18

## 2020-10-09 RX ORDER — AZATHIOPRINE 50 MG/1
TABLET ORAL DAILY
Status: ON HOLD | COMMUNITY
End: 2020-10-18

## 2020-10-10 PROBLEM — N30.00 ACUTE CYSTITIS WITHOUT HEMATURIA: Status: ACTIVE | Noted: 2020-10-10

## 2020-10-10 PROCEDURE — 99233 SBSQ HOSP IP/OBS HIGH 50: CPT | Performed by: HOSPITALIST

## 2020-10-10 RX ORDER — BUDESONIDE 3 MG/1
9 CAPSULE, COATED PELLETS ORAL EVERY MORNING
Status: DISCONTINUED | OUTPATIENT
Start: 2020-10-10 | End: 2020-10-12

## 2020-10-10 RX ORDER — SODIUM CHLORIDE 9 MG/ML
INJECTION, SOLUTION INTRAVENOUS CONTINUOUS
Status: DISCONTINUED | OUTPATIENT
Start: 2020-10-10 | End: 2020-10-11

## 2020-10-10 RX ORDER — ONDANSETRON 2 MG/ML
4 INJECTION INTRAMUSCULAR; INTRAVENOUS EVERY 4 HOURS PRN
Status: DISCONTINUED | OUTPATIENT
Start: 2020-10-10 | End: 2020-10-10

## 2020-10-10 RX ORDER — ACETAMINOPHEN 325 MG/1
650 TABLET ORAL EVERY 6 HOURS PRN
Status: DISCONTINUED | OUTPATIENT
Start: 2020-10-10 | End: 2020-10-19

## 2020-10-10 RX ORDER — HEPARIN SODIUM 5000 [USP'U]/ML
5000 INJECTION, SOLUTION INTRAVENOUS; SUBCUTANEOUS EVERY 12 HOURS SCHEDULED
Status: DISCONTINUED | OUTPATIENT
Start: 2020-10-10 | End: 2020-10-11

## 2020-10-10 RX ORDER — ONDANSETRON 2 MG/ML
4 INJECTION INTRAMUSCULAR; INTRAVENOUS EVERY 6 HOURS PRN
Status: DISCONTINUED | OUTPATIENT
Start: 2020-10-10 | End: 2020-10-19

## 2020-10-10 RX ORDER — PANTOPRAZOLE SODIUM 40 MG/1
40 TABLET, DELAYED RELEASE ORAL
Status: DISCONTINUED | OUTPATIENT
Start: 2020-10-10 | End: 2020-10-19

## 2020-10-10 RX ORDER — AZATHIOPRINE 50 MG/1
50 TABLET ORAL DAILY
Status: DISCONTINUED | OUTPATIENT
Start: 2020-10-10 | End: 2020-10-14

## 2020-10-10 RX ORDER — SODIUM CHLORIDE 0.9 % (FLUSH) 0.9 %
3 SYRINGE (ML) INJECTION AS NEEDED
Status: DISCONTINUED | OUTPATIENT
Start: 2020-10-10 | End: 2020-10-19

## 2020-10-10 NOTE — ED NOTES
Orders for admission, patient is aware of plan and ready to go upstairs. Any questions, please call ED RN Maeve Perkins  at extension [phone].    Type of COVID test sent:  COVID Suspicion level: Low    Titratable drug(s) infusing:Zithromax and 0.9NaCL bolus infusing

## 2020-10-10 NOTE — RESPIRATORY THERAPY NOTE
Pt education completed on IS. Pt does not use oxygen at home, pt denies use of CPAP for DENNIS.
Family informed/Explanation of wait/Patient informed

## 2020-10-10 NOTE — ED PROVIDER NOTES
Patient Seen in: [facility] Emergency Department    History   Patient presents with:  Fever      HPI    The patient presents to the ED complaining of feeling poorly for the past several days.   He states he has had some diarrhea, chills and fatigue and Sexual Activity      Alcohol use: Yes        Comment: hard liquor 1 drink monthly      Drug use: No    Other Topics      Concerns:        Caffeine Concern: Yes          Coffee 3-4 cups daily;  Soda 1 can daily      ROS  Pertinent Positives: Diarrhea, ch normal mood and affect. His behavior is normal.   Nursing note and vitals reviewed.       ED Course        Labs Reviewed   BASIC METABOLIC PANEL (8) - Abnormal; Notable for the following components:       Result Value    Glucose 126 (*)     Sodium 131 (*) ------                                     CBC W/ DIFFERENTIAL[[id number]]          Abnormal            Final result                                                 Please view results for these tests on the individual orders.    SCAN SLIDE   LACTIC ACID 3 H contribute to the complexity of this ED evaluation. ED Course: Patient presents to the ED feeling poorly for several days. Found to be febrile on arrival and concern for sepsis. Patient started on IV fluids and septic work-up sent.   Trace elevation of

## 2020-10-10 NOTE — PROGRESS NOTES
[facility]    Progress Note    Christina Masters Patient Status:  Inpatient    1940 MRN [id number]   Location [facility] 5SW/SE Attending Shabana Milian MD   Hosp Day # 0 PCP Tamia Gloria MD       Subjective:   Blake [place] HCl    Results:     Recent Labs   Lab 10/09/20  1959 10/10/20  0720   RBC 4.27 3.85   HGB 14.5 13.2   HCT 40.0 37.3*   MCV 93.7 96.9   MCH 34.0 34.3*   MCHC 36.3 35.4   RDW 16.6* 16.9*   NEPRELIM 10.68* 7.31   WBC 11.8* 8.9   PLT 74.0* 61.0*     Recent Lab fx  -avoid nephrotoxins     HL  -holding statin with liver problems    Lower ext edema  -2/2 steroids   -monitor while on IVF    VTE P: Heparin    Addendum    Patient continues to be febrile and tachycardic. Run of PSVT 4.3 sec noted on tele.   At this janice

## 2020-10-10 NOTE — SEPSIS REASSESSMENT
Patient returning [facility] phone call to set herself up for her ultrasound requested per FAIZA Wolff NP at the [facility]. Per patient, she has not had an ultrasound so far this pregnancy and her first day of her LMP is 07/05/2017. Patient is approximately 14 weeks based off LMP. Nurse explained to patient that she is past the NT/Sequential Screening range, but that we can get her scheduled for her dating ultrasound to confirm a good pregnancy and establish a due date.    Following dating ultrasound, we can then schedule her for a [facility] consult to discuss genetic screening options at a later date or the MFM may decide to speak to her day of her ultrasound in [facility].    Patient does have Medicaid and [id number] would be an option.    Patient scheduled for Friday 10/20/17.    Patient verbalized understanding of all information received.   Patient was given a partial fluid bolus in ED because of significant LE edema and stable BP. His lactic acid level cleared in 3 hours.    [facility]      Sepsis Reassessment Note    /61 (BP Location: Right arm)   Pulse 96   Temp 98.3

## 2020-10-10 NOTE — CM/SW NOTE
MDO for HH eval.    VM Ref placed with [facility] to eval for services. / to remain available for support and/or discharge planning.      Sammy Dorsey RN    Ext [phone]

## 2020-10-10 NOTE — PROGRESS NOTES
ADMISSION NOTE    79year old male presents fever chills some diarrhea. Found to have UTI, rapid covid negative. CXR ? Atypical infiltrates. . Available medical records partially reviewed. Dictation to follow.     Horace Godfrey M.D.  10/9/2020

## 2020-10-10 NOTE — PLAN OF CARE
Patient resting in bed comfortably. Alert, oriented x 4. Vital signs stable. Denied pain. Desaturating to 87-88% during sleep, placed on 3L of oxygen on nasal cannula. 92% on room air when awake. IV fluids infusing.  Expanded respiratory panel collected & s Identify cognitive and physical deficits and behaviors that affect risk of falls.   - Lovell fall precautions as indicated by assessment.  - Educate pt/family on patient safety including physical limitations  - Instruct pt to call for assistance with act patient weight  - Monitor urine specific gravity, serum osmolarity and serum sodium as indicated or ordered  - Monitor response to interventions for patient's volume status, including labs, urine output, blood pressure (other measures as available)  Cesar Dawson

## 2020-10-10 NOTE — H&P
[facility]    PATIENT'S NAME: PARI GUERRERO   ATTENDING PHYSICIAN: Danielle Vale.  Princess Laina MD   PATIENT ACCOUNT#:   [id number]    LOCATION:  [place] [address]  MEDICAL RECORD #:   [id number]       YOB: 1940  ADMISSION DATE:       10/09 day, he has felt extremely fatigued, exhausted, has had shaking chills alternating with a warm sensation. He has been nauseated, but denied any vomiting.   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 There is no sinus tenderness. Mucous membranes are dry. NECK:  Supple. LUNGS:  Essentially clear. Few scattered rhonchi posteriorly, but no wheezes. Breath sounds are somewhat distant, however. HEART:  Regular rate and rhythm.   Normal S1, patient's current clinical status and proposed treatment plan were discussed with him. All of his questions were answered and he agreed with the plan of care as outlined above.     Dictated By Eleni Escudero MD  d: 10/10/2020 03:12:02  t: 10/10/20

## 2020-10-10 NOTE — ED INITIAL ASSESSMENT (HPI)
Pt here for COVID testing. Pt also noted that he is currently being txed for acute liver inflammation and not sure if his symptoms are from the liver inflammation or COIVID. No known sick contacts. Pt c/o diarrhea, chills, and fatigue.  Pt denies vomiting,

## 2020-10-10 NOTE — PROGRESS NOTES
[facility] Pharmacy Note: Antimicrobial Weight Based Dose Adjustment for: piperacillin/tazobactam (Sheng Jones)    Jolanta Davis is a 79year old patient who has been prescribed piperacillin/tazobactam (ZOSYN) 3.375 g once.   Estimated Creatinine Clearance: 47.7 mL/min

## 2020-10-11 ENCOUNTER — APPOINTMENT (OUTPATIENT)
Dept: CT IMAGING | Facility: HOSPITAL | Age: 80
DRG: 871 | End: 2020-10-11
Attending: HOSPITALIST
Payer: MEDICARE

## 2020-10-11 ENCOUNTER — APPOINTMENT (OUTPATIENT)
Dept: CV DIAGNOSTICS | Facility: HOSPITAL | Age: 80
DRG: 871 | End: 2020-10-11
Attending: HOSPITALIST
Payer: MEDICARE

## 2020-10-11 ENCOUNTER — APPOINTMENT (OUTPATIENT)
Dept: ULTRASOUND IMAGING | Facility: HOSPITAL | Age: 80
DRG: 871 | End: 2020-10-11
Attending: HOSPITALIST
Payer: MEDICARE

## 2020-10-11 PROCEDURE — 74177 CT ABD & PELVIS W/CONTRAST: CPT | Performed by: HOSPITALIST

## 2020-10-11 PROCEDURE — 99233 SBSQ HOSP IP/OBS HIGH 50: CPT | Performed by: HOSPITALIST

## 2020-10-11 PROCEDURE — 93306 TTE W/DOPPLER COMPLETE: CPT | Performed by: HOSPITALIST

## 2020-10-11 PROCEDURE — 71260 CT THORAX DX C+: CPT | Performed by: HOSPITALIST

## 2020-10-11 PROCEDURE — 76705 ECHO EXAM OF ABDOMEN: CPT | Performed by: HOSPITALIST

## 2020-10-11 RX ORDER — HEPARIN SODIUM 1000 [USP'U]/ML
5000 INJECTION, SOLUTION INTRAVENOUS; SUBCUTANEOUS ONCE
Status: COMPLETED | OUTPATIENT
Start: 2020-10-11 | End: 2020-10-11

## 2020-10-11 RX ORDER — HEPARIN SODIUM AND DEXTROSE 10000; 5 [USP'U]/100ML; G/100ML
INJECTION INTRAVENOUS CONTINUOUS
Status: DISCONTINUED | OUTPATIENT
Start: 2020-10-11 | End: 2020-10-14

## 2020-10-11 RX ORDER — HEPARIN SODIUM AND DEXTROSE 10000; 5 [USP'U]/100ML; G/100ML
1000 INJECTION INTRAVENOUS ONCE
Status: COMPLETED | OUTPATIENT
Start: 2020-10-11 | End: 2020-10-11

## 2020-10-11 RX ORDER — ASPIRIN 81 MG/1
81 TABLET ORAL DAILY
Status: DISCONTINUED | OUTPATIENT
Start: 2020-10-11 | End: 2020-10-19

## 2020-10-11 NOTE — CONSULTS
[facility] ID CONSULT NOTE    Virginia Daley Patient Status:  Inpatient    1940 MRN [id number]   Location [facility] 3W/SW Attending Jaun Snider MD   Hosp Day # 1 PCP Marvel Morse MD       Reason f axis deviation 2002   • Prostate enlargement 03/06/2014    Surgery to reduce his prostate     Past Surgical History:   Procedure Laterality Date   • CONTACT LASER SURGERY OF PROSTATE  20104    cystoscopy w/ Jeanie Closs laser ablation of prostate    • DIL U vision, double vision or yellow sclerae. Ears, Nose, Throat:  No hearing loss, sneezing, congestion, runny nose or sore throat. SKIN:  No rash or itching.   CARDIOVASCULAR:  No chest pain, chest pressure or chest discomfort  RESPIRATORY:  No shortness of b Reviewed    ASSESSMENT:    Antibiotics: Meropenem (10/10-  (Azithro x1 10/9, Zosyn 10/9-10/10)    Patient is a  79year-old male with a history of HTN/HLD, BPH, acute AI hepatitis (s/p bx 7/2020- on steroids, follows at [facility])      Pt presents to [address] [place] [address] ER 10/ questions. We will continue to follow with you and will make further recommendations based on his progress.     [facility] PA-C  [facility]  [phone]  10/11/2020

## 2020-10-11 NOTE — PLAN OF CARE
Problem: Patient/Family Goals  Goal: Patient/Family Long Term Goal  Description: Patient's Long Term Goal: To go home.     Interventions:  - DC planning  -Follow MD orders    - See additional Care Plan goals for specific interventions  Outcome: Progressin report SOB or any respiratory difficulty  - Respiratory Therapy support as indicated  - Manage/alleviate anxiety  - Monitor for signs/symptoms of CO2 retention  Outcome: Progressing     Problem: METABOLIC/FLUID AND ELECTROLYTES - ADULT  Goal: Electrolytes

## 2020-10-11 NOTE — SIGNIFICANT EVENT
Patient developed intermittent PSVT and frequent PAC and PVCs. Lactic acid level ordered last night was elevated at 2.7. Patient had received a partial bolus at that time and he spiked a temp to 102.6  I changed his antibiotics to meropenem.   And sent la

## 2020-10-11 NOTE — PROGRESS NOTES
[facility]    Progress Note    Kaya Alicea Patient Status:  Inpatient    1940 MRN [id number]   Location [facility] 5SW/SE Attending Robert Genao MD   Hosp Day # 1 PCP Malena Church MD       Subjective:   Isai Calix Heparin Sodium (Porcine)  5,000 Units Intravenous Once   • Initial dose Heparin infusion  1,000 Units/hr Intravenous Once   • aspirin EC  81 mg Oral Daily   • azathioprine  50 mg Oral Daily   • Budesonide  9 mg Oral QAM   • Pantoprazole Sodium  40 mg Oral catch   Result Value Ref Range    Urine Culture No Growth 2 Days N/A       Imaging/EKG:   Ct Chest+abdomen+pelvis(all Cntrst Only)(cpt=71260/87652)    Result Date: 10/11/2020  CONCLUSION:  1.  Cirrhotic liver morphology with stigmata of portal hypertension, (H) 07/09/2020     (H) 07/17/2020    PCT 2.99 (H) 10/09/2020       Assessment and Plan:     Sepsis in an immunocompromised host  Possibly gallbladder related vs viral enteritis  -IVF were given and stopped  -was on IV zosyn --> changed to meropenem FANI.

## 2020-10-11 NOTE — PROGRESS NOTES
Report given to Will RN. Pt. Aware of transfer. Waiting for transport. All belongings at his side, including glasses and hearing aids.

## 2020-10-11 NOTE — CONSULTS
[facility]    Cardiology Consultation    [facility] Patient Status:  Inpatient    1940 MRN [id number]   Location [facility] 3W/SW Attending Claire Ramirez MD   [facility] Day # 1 PCP Yaneth Sanchez MD     10/9/2020  Sunita Krishna Disease Father    • Blood Disorder Brother         blood clot in leg   • Cancer Brother         Prostate cancer in remission      reports that he quit smoking about 51 years ago. His smoking use included pipe.  He has never used smokeless tobacco. He report 0544  Gross per 24 hour   Intake 581 ml   Output 1025 ml   Net -444 ml     Wt Readings from Last 3 Encounters:  10/11/20 : 261 lb 12.8 oz (118.8 kg)  07/20/20 : 257 lb (116.6 kg)  07/08/20 : 257 lb (116.6 kg)      Physical Exam:   General: Alert and orient recommended in 3 months. 5. Borderline mediastinal lymphadenopathy involving the aortopulmonary window; reassessment on follow-up imaging is advised. 6. Trace bilateral pleural effusions are seen with associated mild compressive atelectasis.   7. The jaspal cystitis without hematuria          Recommendations:  Problem #1 atrial fibrillation  New diagnosis with elevated rates. Currently on Toprol tartrate 25 mg twice a day last dose given at 2 AM.  Rates are elevated secondary to infection most likely.   If he

## 2020-10-11 NOTE — PROGRESS NOTES
[facility] Pharmacy Note:  Renal Adjustment for meropenem [facility])    Theresa Hayes is a 79year old patient who has been prescribed meropenem (MERREM) 500 mg every 8 hrs. CrCl is estimated creatinine clearance is 47 mL/min (A) (based on SCr of 1.44 mg/dL (H)).

## 2020-10-12 ENCOUNTER — TELEPHONE (OUTPATIENT)
Dept: INTERNAL MEDICINE CLINIC | Facility: CLINIC | Age: 80
End: 2020-10-12

## 2020-10-12 PROCEDURE — 99233 SBSQ HOSP IP/OBS HIGH 50: CPT | Performed by: HOSPITALIST

## 2020-10-12 RX ORDER — SODIUM CHLORIDE 9 MG/ML
INJECTION, SOLUTION INTRAVENOUS CONTINUOUS
Status: DISCONTINUED | OUTPATIENT
Start: 2020-10-12 | End: 2020-10-13

## 2020-10-12 RX ORDER — AMIODARONE HYDROCHLORIDE 200 MG/1
400 TABLET ORAL 2 TIMES DAILY WITH MEALS
Status: DISCONTINUED | OUTPATIENT
Start: 2020-10-12 | End: 2020-10-17

## 2020-10-12 RX ORDER — AMIODARONE HYDROCHLORIDE 200 MG/1
400 TABLET ORAL ONCE
Status: COMPLETED | OUTPATIENT
Start: 2020-10-12 | End: 2020-10-12

## 2020-10-12 RX ORDER — IPRATROPIUM BROMIDE AND ALBUTEROL SULFATE 2.5; .5 MG/3ML; MG/3ML
3 SOLUTION RESPIRATORY (INHALATION) EVERY 6 HOURS PRN
Status: DISCONTINUED | OUTPATIENT
Start: 2020-10-12 | End: 2020-10-19

## 2020-10-12 RX ORDER — AMIODARONE HYDROCHLORIDE 200 MG/1
200 TABLET ORAL 2 TIMES DAILY WITH MEALS
Status: DISCONTINUED | OUTPATIENT
Start: 2020-10-12 | End: 2020-10-12

## 2020-10-12 RX ORDER — BUDESONIDE 3 MG/1
6 CAPSULE, COATED PELLETS ORAL EVERY MORNING
Status: DISCONTINUED | OUTPATIENT
Start: 2020-10-13 | End: 2020-10-19

## 2020-10-12 NOTE — TELEPHONE ENCOUNTER
SEE to Dr. Doc Paulino----    [place] Agent [facility] GARTH Fierro for orders per MD protocol.

## 2020-10-12 NOTE — PROGRESS NOTES
Pt was resting in supine position in bed, asleep around 1700. Pt complained of feeling warm. When woke up around 1800 to eat dinner, pt complains of chills and having noticeable body tremors (shaking).  /66 O2 96%.  Audible expiratory wheezing c

## 2020-10-12 NOTE — PROGRESS NOTES
[facility] AND [facility]  Progress Note    Pasquale Dennis Patient Status:  Inpatient    1940 MRN [id number]   Location [facility] 3W/SW Attending Nba Holman MD   Hosp Day # 2 PCP Hebert Hughes MD     Assessment:    1.   Paroxysmal atria dry.     Labs:  Lab Results   Component Value Date    WBC 9.7 10/12/2020    HGB 13.1 10/12/2020    HCT 37.9 10/12/2020    PLT 75.0 10/12/2020     Lab Results   Component Value Date    INR 1.47 (H) 10/10/2020     Lab Results   Component Value Date

## 2020-10-12 NOTE — PLAN OF CARE
Problem: Patient Centered Care  Goal: Patient preferences are identified and integrated in the patient's plan of care  Description: Interventions:  - What would you like us to know as we care for you? I was pretty healthy up until May or June.   - Provide Encourage toileting schedule  Outcome: Progressing     Problem: RESPIRATORY - ADULT  Goal: Achieves optimal ventilation and oxygenation  Description: INTERVENTIONS:  - Assess for changes in respiratory status  - Assess for changes in mentation and behavior

## 2020-10-12 NOTE — TELEPHONE ENCOUNTER
Will Dr Mike Raymond manage home health and sign Home Health orders?   Verbal order ok, ok to leave voice message  Tasked to nursing

## 2020-10-12 NOTE — PROGRESS NOTES
[facility]    Progress Note    Shantell Castañeda Patient Status:  Inpatient    1940 MRN [id number]   Location [facility] 5SW/SE Attending Mary Johnson MD   Hosp Day # 2 PCP Moo Russo MD       Subjective:   Tom Arzola meropenem  500 mg Intravenous Q12H       Current PRN Inpatient Meds:      witch hazel-glycerin, Phenylephrine-Mineral Oil-Pet, Normal Saline Flush, ondansetron HCl, acetaminophen    Results:     Recent Labs   Lab 10/10/20  2238 10/11/20  0432 10/11/20  120 morphology with stigmata of portal hypertension, including mild splenomegaly and moderate volume ascites. 2. Nonspecific duodenal and jejunal wall thickening, which may be reactive in nature or reflect underlying duodenitis/jejunitis.   3. Mesenteric edema (H) 07/09/2020     (H) 07/17/2020    PCT 2.99 (H) 10/09/2020       Assessment and Plan:     Sepsis in an immunocompromised host  Possibly viral enteritis  -IVF were given and stopped  -was on IV zosyn --> changed to meropenem (cont per ID)  -u/c neg illness, I anticipate that, after discharge, patient will require TBD.

## 2020-10-12 NOTE — PROGRESS NOTES
BP 95/55; HR 89; SPO2 94 (1L NC)    0.9% NS 500ml Fluid Bolus (500/hr) running. Will continue IV 0.9 continuous at 50ml/hr per order following bolus. Will continue to monitor vitals.

## 2020-10-13 ENCOUNTER — APPOINTMENT (OUTPATIENT)
Dept: GENERAL RADIOLOGY | Facility: HOSPITAL | Age: 80
DRG: 871 | End: 2020-10-13
Attending: HOSPITALIST
Payer: MEDICARE

## 2020-10-13 ENCOUNTER — APPOINTMENT (OUTPATIENT)
Dept: ULTRASOUND IMAGING | Facility: HOSPITAL | Age: 80
DRG: 871 | End: 2020-10-13
Attending: INTERNAL MEDICINE
Payer: MEDICARE

## 2020-10-13 PROCEDURE — 76770 US EXAM ABDO BACK WALL COMP: CPT | Performed by: INTERNAL MEDICINE

## 2020-10-13 PROCEDURE — 99233 SBSQ HOSP IP/OBS HIGH 50: CPT | Performed by: HOSPITALIST

## 2020-10-13 PROCEDURE — 99223 1ST HOSP IP/OBS HIGH 75: CPT | Performed by: INTERNAL MEDICINE

## 2020-10-13 PROCEDURE — 71045 X-RAY EXAM CHEST 1 VIEW: CPT | Performed by: HOSPITALIST

## 2020-10-13 RX ORDER — VANCOMYCIN HYDROCHLORIDE 125 MG/1
125 CAPSULE ORAL DAILY
Status: DISCONTINUED | OUTPATIENT
Start: 2020-10-13 | End: 2020-10-19

## 2020-10-13 RX ORDER — MIDODRINE HYDROCHLORIDE 5 MG/1
5 TABLET ORAL 3 TIMES DAILY
Status: DISCONTINUED | OUTPATIENT
Start: 2020-10-13 | End: 2020-10-13

## 2020-10-13 RX ORDER — ALBUMIN (HUMAN) 12.5 G/50ML
25 SOLUTION INTRAVENOUS EVERY 8 HOURS
Status: COMPLETED | OUTPATIENT
Start: 2020-10-13 | End: 2020-10-14

## 2020-10-13 RX ORDER — MIDODRINE HYDROCHLORIDE 5 MG/1
10 TABLET ORAL 3 TIMES DAILY
Status: DISCONTINUED | OUTPATIENT
Start: 2020-10-14 | End: 2020-10-16

## 2020-10-13 NOTE — CONSULTS
[facility] HOSP - [facility]    Report of Consultation    Jayce Blake Patient Status:  Inpatient    1940 MRN [id number]   Location [facility] 3W/SW Attending Charmayne Cranker, MD   Hosp Day # 3 PCP Thai Camilo MD     Date of Admissi Prostate enlargement 03/06/2014    Surgery to reduce his prostate       Past Surgical History  Past Surgical History:   Procedure Laterality Date   • CONTACT LASER SURGERY OF PROSTATE  20104    cystoscopy w/ Odalys Allison laser ablation of prostate    • DIL mg, 650 mg, Oral, Q6H PRN    •  Meropenem (MERREM) 500 mg in sodium chloride 0.9% 100 mL MBP, 500 mg, Intravenous, Q12H      •  0.9% NaCl infusion, 25 mL/hr, Intravenous, Continuous        •  furosemide 40 MG Oral Tab, Take 40 mg by mouth 2 (two) times leena appearing, no cachexia      Results:     Laboratory Data:  Lab Results   Component Value Date    WBC 10.9 10/13/2020    HGB 13.1 10/13/2020    HCT 38.3 (L) 10/13/2020    .0 (L) 10/13/2020    CREATSERUM 3.12 (H) 10/13/2020    BUN 61 (H) 10/13/2020 through-transmission within the upper pole cortex of the kidney, compatible with simple cyst.  Small quantity of abdominal ascites. CONCLUSION:  1. Liver has an imaging appearance compatible with cirrhosis. 2.  Pancreas is obscured by overlying bowel.  3. measuring 4.1 x 4.3 cm. The main pulmonary artery trunk is dilated in caliber, measuring 3.5 cm. LUNGS/PLEURA: In the lateral periphery of the right lower lobe, there is a subpleural 0.6 x 0.8 cm ovoid nodule (series 3, image 77).  Trace bilateral pleura mesenteric edema. Small volume intraperitoneal ascites is present. A few scattered borderline-sized mesenteric lymph nodes may be reactive in etiology. URINARY BLADDER: The bladder is collapsed around a Chopra catheter and is not well assessed.  PELVIC NOD 11. Lesser incidental findings as above. A preliminary report was issued by the [address] [place] [address] Radiology teleradiology service. There are no major discrepancies. Remote.    Dictated by (CST): Marisol Lockwood MD on 10/11/2020 at 8:05 AM     Finalized by (CST) ------------------------------------------------------------------- Study data:   Study status:  Elective. Procedure:  Transthoracic echocardiography. Scanning was performed from the parasternal, apical, and subcostal acoustic windows.   Study completion: Image quality was good. Systemic veins: Inferior vena cava: The vessel was normal in size.  The respirophasic diameter changes were in the normal range (= 50%), consistent with normal central venous pressure. ----------------------------------------------- Value        Reference  Aortic root ID                              3.8   cm     &lt;4.5   Left atrium                                 Value        Reference  LA ID, A-P, ES                              3.3   cm     3.0 - 4.0  L adenopathy. LUNGS/PLEURA:   Markings appear more prominent than prior study with some areas of consolidation/atelectasis at the right base. OTHER: Negative. CONCLUSION:  1. in direct patient contact and decision making as well as counseling/coordination of care:  50 minutes  Thank you for allowing me to participate in the care of your patient. Cindy Bonilla MD     10/13/2020

## 2020-10-13 NOTE — PROGRESS NOTES
[facility]    Progress Note    Aga Hinojosa Patient Status:  Inpatient    1940 MRN [id number]   Location [facility] 5SW/SE Attending Gunnar Goodman MD   Hosp Day # 3 PCP Dl Carlisle MD       Subjective:   Roselyn Esteban 81 mg Oral Daily   • azathioprine  50 mg Oral Daily   • Pantoprazole Sodium  40 mg Oral QAM AC   • meropenem  500 mg Intravenous Q12H       Current PRN Inpatient Meds:      witch hazel-glycerin, Phenylephrine-Mineral Oil-Pet, ipratropium-albuterol, Normal Chest+abdomen+pelvis(all Cntrst Only)(cpt=71260/51069)    Result Date: 10/11/2020  CONCLUSION:  1. Cirrhotic liver morphology with stigmata of portal hypertension, including mild splenomegaly and moderate volume ascites.   2. Nonspecific duodenal and jejuna edema is also in the differential.    Dictated by (CST): Thor Wells MD on 10/09/2020 at 8:21 PM     Finalized by (CST): Thor Wells MD on 10/09/2020 at 8:22 PM                   Lab Results   Component Value Date    ARAMIS 3,160.9 (H) 07/09/2020 Heparin gtt         Greater than 35 minutes spent, >50% spent counseling re: treatment plan and workup    Olga Lidia Ellington MD  [facility]       **Certification      PHYSICIAN Certification of Need for Inpatient Hospitalization - Initial

## 2020-10-13 NOTE — CONSULTS
[facility]    Report of Consultation    Date of Admission:  10/9/2020  Date of Consult:  10/13/2020     Reason for Consultation:     AARON     History of Present Illness:     Patient is a 79 yrs old male with pmh of HTN, HL, BPH, autoimmune cystoscopy w/ Kyle Child laser ablation of prostate    • DIL URETHRA ROGERS,MALE,INITIAL  04/14/2014    Dialate Urethra   • FOOT SURGERY  04/17/2014    Right Ankle Surgery       Family History  Family History   Problem Relation Age of Onset   • Dementia Mo Continuous        Allergies  No Known Allergies    Review of Systems:     Constitutional: chills and fatigue  Eyes: negative for irritation, redness and visual disturbance  Ears, nose, mouth, throat, and face: negative for hearing loss and sore throat  Res 10/12/20  0507 10/13/20  0519   RBC 3.88 4.26 3.94 3.94   HGB 13.2 14.5 13.1 13.1   HCT 37.9* 41.1 37.9* 38.3*   MCV 97.7 96.5 96.2 97.2   NEPRELIM 11.27*  --  8.43* 9.72*   WBC 12.6* 13.5* 9.7 10.9   PLT 72.0* 89.0* 75.0* 100.0*     Recent Labs   Lab 10/1 albumin - start on IV albumin with midodrine. Will consider octreotide   - concern for ERIC, HRS .  Unlikely to be AIN or ATN - azathioprine not a common culprit  - azathioprine was discontinued per GI   - I/os and daily weight   - renal US - CT scan from 10

## 2020-10-13 NOTE — PROGRESS NOTES
[facility]    Progress Note    Jolanta Ala Patient Status:  Inpatient    1940 MRN [id number]   Location [facility] 3W/SW Attending Cande Murray MD   Hosp Day # 3 PCP Srikanth Camilo MD       Assessment and Plan: Wt Readings from Last 4 Encounters:  10/13/20 : 278 lb 3.2 oz (126.2 kg)  07/20/20 : 257 lb (116.6 kg)  07/08/20 : 257 lb (116.6 kg)  07/06/20 : 257 lb (116.6 kg)    Tele: a fib      Exam  Gen: No acute distress, alert and oriented x3,   Neck:supple base suggesting increasing consolidation/atelectasis/pneumonia. 3. Cardiomegaly. 4. Atherosclerosis. Dictated by (CST): Funmilayo Andersen MD on 10/13/2020 at 8:18 AM     Finalized by (CST):  Funmilayo Andersen MD on 10/13/2020 at 8:20 AM

## 2020-10-13 NOTE — PLAN OF CARE
Patient alert and oriented. Lying in bed resting. No distress noted. Continue with fluctuating high Heart rates. PO Amiodarone and metoprolol, IV meropenem. Call light within reach. Will continue to monitor.    Problem: Patient Centered Care  Goal: Patient for assistance with activity based on assessment  - Modify environment to reduce risk of injury  - Provide assistive devices as appropriate  - Consider OT/PT consult to assist with strengthening/mobility  - Encourage toileting schedule  Outcome: Progressin

## 2020-10-13 NOTE — PLAN OF CARE
Blood pressure low & low urine output - nephrology consulted & Dr. Farzaneh Cristobal aware. IV Albumin & mitodrine started. IV Meropenum ran today. Continuous IV fluids stopped. IV heparin running at 10ml/hr. Redraw in AM. Will continue to monitor for now.     Prob physical limitations  - Instruct pt to call for assistance with activity based on assessment  - Modify environment to reduce risk of injury  - Provide assistive devices as appropriate  - Consider OT/PT consult to assist with strengthening/mobility  - Encou urine output, blood pressure (other measures as available)  - Encourage oral intake as appropriate  - Instruct patient on fluid and nutrition restrictions as appropriate  Outcome: Progressing

## 2020-10-14 ENCOUNTER — APPOINTMENT (OUTPATIENT)
Dept: CV DIAGNOSTICS | Facility: HOSPITAL | Age: 80
DRG: 871 | End: 2020-10-14
Attending: NURSE PRACTITIONER
Payer: MEDICARE

## 2020-10-14 PROCEDURE — 99233 SBSQ HOSP IP/OBS HIGH 50: CPT | Performed by: HOSPITALIST

## 2020-10-14 PROCEDURE — 99233 SBSQ HOSP IP/OBS HIGH 50: CPT | Performed by: INTERNAL MEDICINE

## 2020-10-14 PROCEDURE — 93308 TTE F-UP OR LMTD: CPT | Performed by: NURSE PRACTITIONER

## 2020-10-14 RX ORDER — HEPARIN SODIUM AND DEXTROSE 10000; 5 [USP'U]/100ML; G/100ML
INJECTION INTRAVENOUS CONTINUOUS
Status: DISCONTINUED | OUTPATIENT
Start: 2020-10-14 | End: 2020-10-14

## 2020-10-14 RX ORDER — METOPROLOL SUCCINATE 25 MG/1
25 TABLET, EXTENDED RELEASE ORAL NIGHTLY
Status: DISCONTINUED | OUTPATIENT
Start: 2020-10-14 | End: 2020-10-19

## 2020-10-14 RX ORDER — HEPARIN SODIUM AND DEXTROSE 10000; 5 [USP'U]/100ML; G/100ML
INJECTION INTRAVENOUS CONTINUOUS
Status: DISCONTINUED | OUTPATIENT
Start: 2020-10-14 | End: 2020-10-17

## 2020-10-14 NOTE — PLAN OF CARE
This morning patient is comfortable, denies SOB/CP, and remains on hep gtt @10 and Sandostatin @5ml/hr. Ptt is therapeutic with next draw in the AM; plan is for home with [facility] once medically stable.  Bed locked in lowest position, bed alarm activated, call l precautions as indicated by assessment.  - Educate pt/family on patient safety including physical limitations  - Instruct pt to call for assistance with activity based on assessment  - Modify environment to reduce risk of injury  - Provide assistive device ordered  - Monitor response to interventions for patient's volume status, including labs, urine output, blood pressure (other measures as available)  - Encourage oral intake as appropriate  - Instruct patient on fluid and nutrition restrictions as appropri

## 2020-10-14 NOTE — PROGRESS NOTES
[facility]    Progress Note    Jolanta Davis Patient Status:  Inpatient    1940 MRN [id number]   Location [facility] 3W/SW Attending Shane Marroquin MD   [facility] Day # 4 PCP Srikanth Camilo MD        Subjective:   Eva Palacios unclear but most likely not an ACS event and more likely from sepsis and hypotension.  -started on ASA      Acute kidney injury, worsening today  Multifactorial ? Contrast injury, hypotension, sepsis   -trend and monitor  -avoid nephrotoxins  -suspect rela consolidation/atelectasis/pneumonia. 3. Cardiomegaly. 4. Atherosclerosis. Dictated by (CST): Zuly Mulligan MD on 10/13/2020 at 8:18 AM     Finalized by (CST):  Zuly Mulligan MD on 10/13/2020 at 8:20 AM                       Lexx Yuan MD  10

## 2020-10-14 NOTE — DIETARY NOTE
ADULT NUTRITION INITIAL ASSESSMENT    Pt is at moderate nutrition risk. Pt does not meet malnutrition criteria.       RECOMMENDATIONS TO MD:  See Nutrition Intervention      ADMITTING DIAGNOSIS:   Acute cystitis without hematuria,  Sepsis due to undetermin kg/m². BMI CLASSIFICATION: 30-34.9 kg/m2 - obesity class I  IBW: 184 lbs        152% IBW  Usual Body Wt: 250-255 lbs       110-112% UBW  Patient stated he does not believe he is 280 lbs. Bed Zeroed and requested repeat weight.   WEIGHT HISTORY:  Patient W CA 8.3* 8.5 8.6   * 135* 131*   K 4.6 4.4 4.6    105 105   CO2 26.0 23.0 18.0*   OSMOCALC 293 298* 293   Albumin low.  However, serum protein markers such as albumin, prealbumin, etc are a reflection of the acute-phase response and do not accu

## 2020-10-14 NOTE — OCCUPATIONAL THERAPY NOTE
OCCUPATIONAL THERAPY EVALUATION - INPATIENT     Room Number: 322/322-A  Evaluation Date: 10/14/2020  Type of Evaluation: Initial       Physician Order: IP Consult to Occupational Therapy  Reason for Therapy: ADL/IADL Dysfunction and Discharge Planning    O basic ADL and everyday activities. DISCHARGE RECOMMENDATIONS     OT Device Recommendations: None    PLAN  OT Treatment Plan: Balance activities; Energy conservation/work simplification techniques;ADL training;Functional transfer training; Endurance tra TOLERANCE  Pulse: 110        BP: 109/62(99/74 sitting eob, 106/70 standing, 101/84 sitting post amb)  BP Location: Right arm  BP Method: Automatic  Patient Position: Sitting    O2 SATURATIONS  SPO2 on Room Air at Rest: 92             COGNITION  Overall Cog item retrieval with MI   Comment:          Goals  on: 10/25/20  Frequency: 3-5x week    Dorinda Kelly, OTR/L   [facility]

## 2020-10-14 NOTE — PHYSICAL THERAPY NOTE
PHYSICAL THERAPY EVALUATION - INPATIENT     Room Number: 322/322-A  Evaluation Date: 10/14/2020  Type of Evaluation: Initial   Physician Order: PT Eval and Treat    Presenting Problem: sepsis  Reason for Therapy: Mobility Dysfunction and Discharge Plannin feeling \"poorly\" with diarrhea.      Problem List  Principal Problem:    Sepsis due to undetermined organism [facility])  Active Problems:    Acute cystitis without hematuria    AARON (acute kidney injury) ([facility] [address]    Cirrhosis of liver with ascites (HCC)      Past -  Dynamic Standing: Poor +          NEUROLOGICAL FINDINGS      light touch intact ra le                ACTIVITY TOLERANCE  Pulse: 86  Heart Rate Source: Monitor     BP: 109/62(99/74 sitting eob, 106/70 standing, 101/84 sitting post amb)  BP Location: Rig Goal #2 Patient is able to demonstrate transfers Sit to/from Stand at assistance level: modified independent with walker - rolling     Goal #2  Current Status    Goal #3 Patient is able to ambulate 200 feet with assist device: walker - rolling at [employer]

## 2020-10-14 NOTE — PROGRESS NOTES
[facility]  Gastroenterology Progress Note    Erin Dutton Patient Status:  Inpatient    1940 MRN [id number]   Location [facility] 3W/SW Attending Johnson Taylor MD   Hosp Day # 4 PCP Jacque Lance MD     Subjective:  Yaw Diaz Hearing loss of right ear     Thrombocytopenia (HCC)     Abnormal fasting glucose     Primary osteoarthritis involving multiple joints     Acquired equinus deformity of foot     Acquired hallux valgus     Disorder of lipid metabolism     Disorder of muscle

## 2020-10-14 NOTE — PROGRESS NOTES
[facility]    Progress Note    Manoj Cruz Patient Status:  Inpatient    1940 MRN [id number]   Location [facility] 3W/SW Attending Pamela Toro MD   Hosp Day # 4 PCP Annika Lutz MD     CARDIOLOGY ATTENDING spirits. Denies chest pain or SOB. He does feel that his abdomen is more distended. Persistent LE edema. Patient had the chills episodes Friday, Saturday, none Sunday, but then again Monday. Nothing yesterday, so its unclear if improving.      Wife at beds previous study was available for comparison.     Exam  Gen: No acute distress, alert and oriented x3,   Neck:supple,no JVD  Pulm: Lungs clear, normal respiratory effort  CV: Heart with irregularly irregular rate and rhythm, nl S1,S2 ,no murmur  Abd: Abdomen Danette Melvin MD on 10/13/2020 at 8:18 AM     Finalized by (CST):  Lelo Armas MD on 10/13/2020 at 8:20 AM                  GRAYSON Martinez  10/14/2020

## 2020-10-14 NOTE — PLAN OF CARE
Problem: Patient Centered Care  Goal: Patient preferences are identified and integrated in the patient's plan of care  Description: Interventions:  - What would you like us to know as we care for you? I was pretty healthy up until May or June.   - Provide Encourage toileting schedule  Outcome: Progressing     Problem: RESPIRATORY - ADULT  Goal: Achieves optimal ventilation and oxygenation  Description: INTERVENTIONS:  - Assess for changes in respiratory status  - Assess for changes in mentation and behavior administered. Pt remains fever free, no wheezing, but sob at rest. Chopra is in place, pt still having minimal output. Nephrology aware. Starting pressors this am. No complaints of pain. Call light in reach, will continue to monitor.

## 2020-10-14 NOTE — PROGRESS NOTES
[place] [facility]    Felicita Martinez Patient Status:  Inpatient    1940 MRN [id number]   Location [facility] 3W/SW Attending Rachid Rosario MD   Hosp Day # 4 PCP Winston Estrada MD     Subjective:  RO Hypercholesterolemia     Impotence of organic origin     Microscopic hematuria     Nocturia     Nontraumatic rupture of tendon     Pain in joint involving ankle and foot     Polydipsia     Polyuria     Retained orthopedic hardware     Retention of urine resolved- rapid/PCR ND, RVP ND               - Blood and urine cx ngtd  # Leukocytosis w/bandemia  # Hypotension  # AARON on CKD  # Afib, elevated troponin  # Pulmonary nodule  # Elevated BNP, BLE edema  # Autoimmune hepatitis (on 9mg budesonide, follows at

## 2020-10-15 ENCOUNTER — APPOINTMENT (OUTPATIENT)
Dept: INTERVENTIONAL RADIOLOGY/VASCULAR | Facility: HOSPITAL | Age: 80
DRG: 871 | End: 2020-10-15
Attending: NURSE PRACTITIONER
Payer: MEDICARE

## 2020-10-15 PROCEDURE — 4A023N6 MEASUREMENT OF CARDIAC SAMPLING AND PRESSURE, RIGHT HEART, PERCUTANEOUS APPROACH: ICD-10-PCS | Performed by: INTERNAL MEDICINE

## 2020-10-15 PROCEDURE — 99233 SBSQ HOSP IP/OBS HIGH 50: CPT | Performed by: HOSPITALIST

## 2020-10-15 PROCEDURE — 99233 SBSQ HOSP IP/OBS HIGH 50: CPT | Performed by: INTERNAL MEDICINE

## 2020-10-15 PROCEDURE — B2111ZZ FLUOROSCOPY OF MULTIPLE CORONARY ARTERIES USING LOW OSMOLAR CONTRAST: ICD-10-PCS | Performed by: INTERNAL MEDICINE

## 2020-10-15 RX ORDER — ALBUMIN (HUMAN) 12.5 G/50ML
25 SOLUTION INTRAVENOUS EVERY 8 HOURS
Status: COMPLETED | OUTPATIENT
Start: 2020-10-15 | End: 2020-10-16

## 2020-10-15 RX ORDER — SODIUM CHLORIDE 9 MG/ML
INJECTION, SOLUTION INTRAVENOUS CONTINUOUS
Status: DISCONTINUED | OUTPATIENT
Start: 2020-10-15 | End: 2020-10-15

## 2020-10-15 RX ORDER — CHLORHEXIDINE GLUCONATE 4 G/100ML
30 SOLUTION TOPICAL
Status: ACTIVE | OUTPATIENT
Start: 2020-10-16 | End: 2020-10-16

## 2020-10-15 RX ORDER — BUMETANIDE 0.25 MG/ML
1 INJECTION, SOLUTION INTRAMUSCULAR; INTRAVENOUS ONCE
Status: COMPLETED | OUTPATIENT
Start: 2020-10-15 | End: 2020-10-15

## 2020-10-15 RX ORDER — LIDOCAINE HYDROCHLORIDE 20 MG/ML
INJECTION, SOLUTION EPIDURAL; INFILTRATION; INTRACAUDAL; PERINEURAL
Status: COMPLETED
Start: 2020-10-15 | End: 2020-10-15

## 2020-10-15 RX ORDER — MIDAZOLAM HYDROCHLORIDE 1 MG/ML
INJECTION INTRAMUSCULAR; INTRAVENOUS
Status: COMPLETED
Start: 2020-10-15 | End: 2020-10-15

## 2020-10-15 RX ORDER — SODIUM CHLORIDE 9 MG/ML
INJECTION, SOLUTION INTRAVENOUS
Status: ACTIVE | OUTPATIENT
Start: 2020-10-16 | End: 2020-10-16

## 2020-10-15 NOTE — IVS NOTE
Procedure hand off report given to GARTH Ryan. Procedure site remains dry and intact with no signs and symptoms of bleeding and hematoma. Bedrest maintained. Dr Barrie Pickett spoke with pt post procedure.

## 2020-10-15 NOTE — PROGRESS NOTES
[facility]  Gastroenterology Progress Note    Dianna Chilel Patient Status:  Inpatient    1940 MRN [id number]   Location [facility] 3W/SW Attending Raúl Stack MD   Hosp Day # 5 PCP Desiree Luna MD     Subjective:  Kai Rack Polydipsia     Polyuria     Retained orthopedic hardware     Retention of urine     Tibialis tendinitis     Urethral stricture     Urgency of urination     Urinary tract infection     Venous (peripheral) insufficiency     Sepsis due to undetermined organis

## 2020-10-15 NOTE — PROGRESS NOTES
[facility]    Progress Note    Randall Miranda Patient Status:  Inpatient    1940 MRN [id number]   Location [facility] 3W/SW Attending Romeo Steele MD   [facility] Day # 4 PCP Millie Alfredo MD       Subjective:   Shelley Valencia abnormal bruising noted  Back/Spine: no abnormalities noted  Musculoskeletal: full ROM all extremities good strength  no deformities  Extremities:1+edema  Neurological:  Grossly normal    Results:     Laboratory Data:  Lab Results   Component Value Date some gentle hydration see if output picks up   defintely recommend R heart cath to estimate pressures   IF pressures are normal or high, would be more c/w hepatorenal   if they are low , then keep up ivf        3 afib  Is on heparin      10/14/2020  [place]

## 2020-10-15 NOTE — PLAN OF CARE
Problem: Patient Centered Care  Goal: Patient preferences are identified and integrated in the patient's plan of care  Description: Interventions:  - What would you like us to know as we care for you? I was pretty healthy up until May or June.   - Provide Encourage toileting schedule  Outcome: Progressing     Problem: RESPIRATORY - ADULT  Goal: Achieves optimal ventilation and oxygenation  Description: INTERVENTIONS:  - Assess for changes in respiratory status  - Assess for changes in mentation and behavior 10ml/hr with lab draw to be done this am, sandostatin running at 5ml/hr, NS running at 70ml/hr. IV Meropenem given. No complaints of pain. Pt has been NPO since midnight for potential R cath for today depending on kidney function.  Chopra in place and draini

## 2020-10-15 NOTE — PROGRESS NOTES
[facility] - [facility]    Progress Note    Rashaun Chatman Patient Status:  Inpatient    1940 MRN [id number]   Location [facility] 3W/SW Attending Jodi Babb MD   Hosp Day # 5 PCP Js Yanez MD     CARDIOLOGY ATTENDING his wife does note that he is exhausted with any movement. I did offer him PT, but he tells me he is just to weak. Echo showed dilated IVF. He did get gentle hydration overnight. He does feel that his abdomen is more distended. Persistent LE edema.      Wif vena cava: The vessel was dilated. The respirophasic  diameter changes were blunted (&lt; 50%), consistent with elevated  central venous pressure. Echo 10/11/2020  Study Conclusions  1. Left ventricle:  The cavity size was normal. Wall thickness was 16*   CA 8.5 8.6 8.6   * 131* 132*   K 4.4 4.6 5.0    105 104   CO2 23.0 18.0* 22.0       Recent Labs   Lab 10/10/20  2238 10/11/20  0041 10/11/20  0432 10/13/20  0519   TROP 4.060* 3.330* 2.120*  --    CK  --   --   --  44       Lab Results

## 2020-10-15 NOTE — PROGRESS NOTES
[facility]    Progress Note    Tricia Castillo Patient Status:  Inpatient    1940 MRN [id number]   Location [facility] 3W/SW Attending Chico Harris MD   [facility] Day # 5 PCP Antoni Banegas MD        Subjective:   Carol Bush not an ACS event and more likely from sepsis and hypotension.  -started on ASA      Acute kidney injury, worsening today  Multifactorial ? Contrast injury, hypotension, sepsis   -trend and monitor  -avoid nephrotoxins  -suspect related to hypotension

## 2020-10-15 NOTE — PLAN OF CARE
This morning patient is comfortable, denies SOB/CP, and remains on Hep gtt @10 and Sandostatin @5mL/hr. Plan is for right heart cath today. Bed locked in lowest position, bed alarm activated, call light within reach, and frequent rounding in progress.  Will pt/family on patient safety including physical limitations  - Instruct pt to call for assistance with activity based on assessment  - Modify environment to reduce risk of injury  - Provide assistive devices as appropriate  - Consider OT/PT consult to shayy patient's volume status, including labs, urine output, blood pressure (other measures as available)  - Encourage oral intake as appropriate  - Instruct patient on fluid and nutrition restrictions as appropriate  Outcome: Progressing

## 2020-10-15 NOTE — PROGRESS NOTES
[facility]    Progress Note      Subjective:     Lying flat comfortably - no distress or sob    Wife at bedside     Noticed leg swelling and more abdominal distention       Review of Systems:     Constitutional: chills and fatigue  Eyes: Octreotide Acetate (sandoSTATIN) 500 mcg in sodium chloride 0.9% 100 mL infusion, 25 mcg/hr, Intravenous, Continuous    •  Metoprolol Succinate ER (Toprol XL) 24 hr tab 25 mg, 25 mg, Oral, Nightly    •  heparin (PORCINE) drip [id number]/250mL infusion CONT 26.0 23.0 18.0* 22.0   ALKPHO 68 81 72  --    AST 31 36 29  --    ALT 56 55 46  --    BILT 1.2 1.1 0.9  --    TP 5.0* 5.3* 5.3*  --      PTT   Date Value Ref Range Status   10/15/2020 53.6 (H) 23.2 - 35.3 seconds Final     Comment:     Elevations of the aP common culprit  - azathioprine was discontinued per GI   - I/os and daily weight   - renal US - CT scan from 10/11 not suggestive of any obstructive pathology  - hgb and plt stable  - Echo with normal EF and normal wall motion with grade I DD  - volume ove

## 2020-10-15 NOTE — PROGRESS NOTES
[facility]    [facility] Cardiac Cath Procedure Note  Namita Gilmore Patient Status:  Inpatient    1940 MRN [id number]   Location [facility] Attending Kevin Lozano MD   [facility] Day # 5 PCP Jose David Molina was also performed. Next the [place] catheter was then removed. Hemostasis to the right internal jugular access site was obtained using manual pressure.   The patient was then transferred to the cardiac catheterization laboratory in hemodynamically and

## 2020-10-15 NOTE — PROGRESS NOTES
Pre-Procedure Sedation Assessment    Chief Complaint/Indication for procedure: SOB     History of snoring or sleep or apnea?    No    History of previous problems with anesthesia or sedation  No    Physical Findings:  Neck: nl ROM  CV: RRR, +s1/s2, no MRG

## 2020-10-16 PROCEDURE — 99233 SBSQ HOSP IP/OBS HIGH 50: CPT | Performed by: INTERNAL MEDICINE

## 2020-10-16 PROCEDURE — 99233 SBSQ HOSP IP/OBS HIGH 50: CPT | Performed by: HOSPITALIST

## 2020-10-16 PROCEDURE — 99222 1ST HOSP IP/OBS MODERATE 55: CPT | Performed by: INTERNAL MEDICINE

## 2020-10-16 RX ORDER — MIDODRINE HYDROCHLORIDE 5 MG/1
5 TABLET ORAL 3 TIMES DAILY
Status: DISCONTINUED | OUTPATIENT
Start: 2020-10-16 | End: 2020-10-19

## 2020-10-16 RX ORDER — BUMETANIDE 0.25 MG/ML
1 INJECTION, SOLUTION INTRAMUSCULAR; INTRAVENOUS 3 TIMES DAILY
Status: DISCONTINUED | OUTPATIENT
Start: 2020-10-16 | End: 2020-10-19

## 2020-10-16 RX ORDER — BUMETANIDE 0.25 MG/ML
1 INJECTION, SOLUTION INTRAMUSCULAR; INTRAVENOUS ONCE
Status: COMPLETED | OUTPATIENT
Start: 2020-10-16 | End: 2020-10-16

## 2020-10-16 NOTE — PROGRESS NOTES
[facility]    Progress Note    Jacob Huber Patient Status:  Inpatient    1940 MRN [id number]   Location [facility] 3W/SW Attending Massiel Naik MD   [facility] Day # 6 PCP Joaquim Ruiz MD        Subjective:   Gus Winchester monitor  -avoid nephrotoxins  -suspect related to hypotension   - appreciate nephro recs     HTN  -monitor  -BP lower side, fluids as needed      Autoimmune hepatitis  CT with cirrhotic morphology   -LFTs much improved   -currently on budesonide, Azathiopr

## 2020-10-16 NOTE — PROGRESS NOTES
[facility]  Gastroenterology Progress Note    Sirisha Berger Patient Status:  Inpatient    1940 MRN [id number]   Location [facility] 3W/SW Attending Zully Madison MD   Hosp Day # 6 PCP Avelino Verde MD     Subjective:  Antoni Oseguera Retention of urine     Tibialis tendinitis     Urethral stricture     Urgency of urination     Urinary tract infection     Venous (peripheral) insufficiency     Sepsis due to undetermined organism ([facility] [address]     Acute cystitis without hematuria     AARON (acute k

## 2020-10-16 NOTE — PHYSICAL THERAPY NOTE
PHYSICAL THERAPY TREATMENT NOTE - INPATIENT     Room Number: 944/828-J       Presenting Problem: sepsis    Problem List  Principal Problem:    Sepsis due to undetermined organism [facility])  Active Problems:    Acute cystitis without hematuria    AARON (acute kid bored\"    OBJECTIVE  Precautions: Bed/chair alarm    WEIGHT BEARING RESTRICTION  Weight Bearing Restriction: None                PAIN ASSESSMENT   Ratin          BALANCE encouraging    THERAPEUTIC EXERCISES  Lower Extremity Alternating marching  Heel raises  Toe raises  standing posture and breathing exercises     Position Standing       Patient End of Session: Up in chair;Needs met;Call light within reach;RN aware of sess

## 2020-10-16 NOTE — PROGRESS NOTES
[facility]    Progress Note      Subjective:     Lying comfortably - no chest pain or sob        Review of Systems:     Constitutional: chills and fatigue  Eyes: negative for irritation, redness and visual disturbance  Ears, nose, mouth, mg, 25 mg, Oral, Nightly    •  heparin (PORCINE) drip 31522tqypm/250mL infusion CONTINUOUS, 200-3,000 Units/hr, Intravenous, Continuous    •  Midodrine HCl (PROAMATINE) tab 10 mg, 10 mg, Oral, TID    •  vancomycin HCl (VANCOCIN) cap 125 mg, 125 mg, Oral, D 1.1 0.9  --   --    TP 5.0* 5.3* 5.3*  --   --      PTT   Date Value Ref Range Status   10/16/2020 72.0 (H) 23.2 - 35.3 seconds Final     Comment:     Elevations of the aPTT in patients not receiving  anticoagulant therapy (Heparin, etc.), may be  seen in - renal US - CT scan from 10/11 not suggestive of any obstructive pathology  - hgb and plt stable  - Echo with normal EF and normal wall motion with grade I DD      2.  volume overload with BLE edema and likely worsening ascites   - received 2 mg IV bume

## 2020-10-16 NOTE — PROGRESS NOTES
[place] [facility]    Patt Hector Patient Status:  Inpatient    1940 MRN [id number]   Location [facility] 3W/SW Attending Dada Plaza MD   Hosp Day # 6 PCP Shazia Woods MD     Subjective:  RO fascia     Family history of ischemic heart disease     Hypercholesterolemia     Impotence of organic origin     Microscopic hematuria     Nocturia     Nontraumatic rupture of tendon     Pain in joint involving ankle and foot     Polydipsia     Polyuria effusions                        - Transient hypoxia, resolved- rapid/PCR ND, RVP ND               - Blood and urine cx ngtd  # Leukocytosis w/bandemia  # Hypotension  # AARON on CKD  # Afib, elevated troponin   -S/P R heart cath 10/5  # Pulmonary nodule  #

## 2020-10-16 NOTE — PLAN OF CARE
Problem: Patient Centered Care  Goal: Patient preferences are identified and integrated in the patient's plan of care  Description: Interventions:  - What would you like us to know as we care for you? I was pretty healthy up until May or June.   - Provide Encourage toileting schedule  Outcome: Progressing     Problem: RESPIRATORY - ADULT  Goal: Achieves optimal ventilation and oxygenation  Description: INTERVENTIONS:  - Assess for changes in respiratory status  - Assess for changes in mentation and behavior bumex as ordered. Heparin therapeutic; ptt in am. Will continue to monitor.

## 2020-10-16 NOTE — CONSULTS
[facility]    Report of Consultation    Pasquale Dennis Patient Status:  Inpatient    1940 MRN [id number]   Location [facility] 3W/SW Attending Jolyn Libman, MD   Hosp Day # 6 PCP Hebert Hughes MD     Date of Admissi prostate       Past Surgical History  Past Surgical History:   Procedure Laterality Date   • CONTACT LASER SURGERY OF PROSTATE  20104    cystoscopy w/ Sonya Lemon laser ablation of prostate    • DIL URETHRA STRIC,MALE,INITIAL  04/14/2014    Dialate Urethra mg, 4 mg, Intravenous, Q6H PRN    •  Pantoprazole Sodium (PROTONIX) EC tab 40 mg, 40 mg, Oral, QAM AC    •  acetaminophen (TYLENOL) tab 650 mg, 650 mg, Oral, Q6H PRN      •  0.9% NaCl infusion, 25 mL/hr, Intravenous, Continuous        •  furosemide 40 MG O 1.30 (H) 10/15/2020    PTP 16.0 (H) 10/15/2020    TSH 3.340 11/01/2019     (H) 10/14/2020    PSA 0.8 10/29/2018    ESRML 52 (H) 10/13/2020    MG 2.9 (H) 10/16/2020    PHOS 5.3 (H) 10/16/2020    TROP 2.120 (HH) 10/11/2020    CK 44 10/13/2020

## 2020-10-16 NOTE — PLAN OF CARE
This morning patient is comfortable, denies SOB/CP, and remains on Hep gtt @10 with therapeutic pTT. Plan is for scheduled IV Bumex and possible US of abdomin. Bed locked in lowest position, call light within reach, and frequent rounding in progress.  Will pt/family on patient safety including physical limitations  - Instruct pt to call for assistance with activity based on assessment  - Modify environment to reduce risk of injury  - Provide assistive devices as appropriate  - Consider OT/PT consult to shayy patient's volume status, including labs, urine output, blood pressure (other measures as available)  - Encourage oral intake as appropriate  - Instruct patient on fluid and nutrition restrictions as appropriate  Outcome: Progressing

## 2020-10-17 PROCEDURE — 99233 SBSQ HOSP IP/OBS HIGH 50: CPT | Performed by: HOSPITALIST

## 2020-10-17 PROCEDURE — 99233 SBSQ HOSP IP/OBS HIGH 50: CPT | Performed by: INTERNAL MEDICINE

## 2020-10-17 RX ORDER — AMIODARONE HYDROCHLORIDE 200 MG/1
200 TABLET ORAL DAILY
Status: DISCONTINUED | OUTPATIENT
Start: 2020-10-18 | End: 2020-10-19

## 2020-10-17 NOTE — PROGRESS NOTES
[facility]    Progress Note    Valente Lomeli Patient Status:  Inpatient    1940 MRN [id number]   Location [facility] 3W/SW Attending Angélica Schaffer MD   [facility] Day # 7 PCP Jermain Norwood MD       Subjective:   Alessandro Castellanos abnormalities noted  Musculoskeletal: full ROM all extremities good strength  no deformities  Extremities: 1+ edema  Neurological:  Grossly normal    Results:     Laboratory Data:  Lab Results   Component Value Date    WBC 7.3 10/17/2020    HGB 12.4 (L) 10

## 2020-10-17 NOTE — PROGRESS NOTES
[facility]    Progress Note    Any Fields Patient Status:  Inpatient    1940 MRN [id number]   Location [facility] 3W/SW Attending Kim Costello MD   [facility] Day # 7 PCP Charmayne Applebaum, MD        Subjective:   Александр Butterfield monitor  -avoid nephrotoxins  -suspect related to hypotension   - appreciate nephro recs     HTN  -monitor  -BP lower side, fluids as needed      Autoimmune hepatitis  CT with cirrhotic morphology   -LFTs much improved   -currently on budesonide, Azathiopr

## 2020-10-17 NOTE — CONSULTS
[address] [facility] CONSULT      Patient: Shantell Castañeda Date: 10/16/2020     : 1940 Attending: Wendy Ruth MD   79year old male Requested by: Dr. Arpit Tellez seen on 10/16/20 @ 4:30 PM. Note is being signed late.     A/P: in decent health up until Spring. He went on a road trip and after he came back he developed jaundice, abdominal pain, and dark urine. He was found to have elevated LFTs. He never had liver issues previously.  He saw Dr. Amparo Padilla with [facility] and had CT scan, MRI/ 04/17/2014    Right Ankle Surgery       Social History    Socioeconomic History      Marital status:       Spouse name: Not on file      Number of children: Not on file      Years of education: Not on file      Highest education level: Not on file Asked        Seat Belt: Not Asked        Self-Exams: Not Asked    Social History Narrative      Not on file      Family History   Problem Relation Age of Onset   • Dementia Mother    • Heart Disease Mother         CAD   • Heart Disease Sister    • Heart Maverick Cava Summary (Last 24 hours) at 10/16/2020 2326  Last data filed at 10/16/2020 2212  Gross per 24 hour   Intake 1005.13 ml   Output 4425 ml   Net -3419.87 ml       Physical Assessment:  Gen: alert, comfortable  HEENT: MMM  Neck: no JVD, supple  Heart: IRIR, S1,

## 2020-10-17 NOTE — PLAN OF CARE
Heparin gtt off this morning, Eliquis started per cards. Continue IV Bumex. Pt is anxious to go home. Chopra in place, good urine output. Wife, Fior Connell, at bedside and updated on plan of care.      Problem: Patient Centered Care  Goal: Patient preferences ar schedule  Outcome: Progressing     Problem: RESPIRATORY - ADULT  Goal: Achieves optimal ventilation and oxygenation  Description: INTERVENTIONS:  - Assess for changes in respiratory status  - Assess for changes in mentation and behavior  - Position to faci

## 2020-10-17 NOTE — PROGRESS NOTES
[facility]    Cardiology Progress Note    Aga Hinojosa Patient Status:  Inpatient    1940 MRN [id number]   Location [facility] 3W/SW Attending Joe Mccabe MD   [facility] Day # 7 PCP Dl Carlisle MD         Assessmen Intravenous Once   • aspirin EC  81 mg Oral Daily   • Pantoprazole Sodium  40 mg Oral QAM AC         Results:     Lab Results   Component Value Date    WBC 7.3 10/17/2020    HGB 12.4 (L) 10/17/2020    HCT 36.1 (L) 10/17/2020    PLT 96.0 (L) 10/17/2020    C

## 2020-10-17 NOTE — PLAN OF CARE
Alert and oriented. Chopra in place. IV Bumex given. No c/o pain. Bed locked and in lowest position. Bed alarm and ibed in place. Call light in reach. Will continue to monitor.        Problem: Patient Centered Care  Goal: Patient preferences are identified a activity based on assessment  - Modify environment to reduce risk of injury  - Provide assistive devices as appropriate  - Consider OT/PT consult to assist with strengthening/mobility  - Encourage toileting schedule  Outcome: Progressing     Problem: RESPI Encourage oral intake as appropriate  - Instruct patient on fluid and nutrition restrictions as appropriate  Outcome: Progressing

## 2020-10-18 PROCEDURE — 99233 SBSQ HOSP IP/OBS HIGH 50: CPT | Performed by: HOSPITALIST

## 2020-10-18 PROCEDURE — 99233 SBSQ HOSP IP/OBS HIGH 50: CPT | Performed by: INTERNAL MEDICINE

## 2020-10-18 RX ORDER — BUMETANIDE 1 MG/1
1 TABLET ORAL 2 TIMES DAILY
Qty: 60 TABLET | Refills: 0 | Status: SHIPPED | OUTPATIENT
Start: 2020-10-18 | End: 2020-10-19

## 2020-10-18 RX ORDER — METOPROLOL SUCCINATE 25 MG/1
25 TABLET, EXTENDED RELEASE ORAL NIGHTLY
Qty: 30 TABLET | Refills: 0 | Status: SHIPPED | OUTPATIENT
Start: 2020-10-18 | End: 2020-11-09

## 2020-10-18 RX ORDER — MIDODRINE HYDROCHLORIDE 5 MG/1
5 TABLET ORAL 3 TIMES DAILY
Qty: 90 TABLET | Refills: 0 | Status: SHIPPED | OUTPATIENT
Start: 2020-10-18 | End: 2020-11-09

## 2020-10-18 RX ORDER — AMIODARONE HYDROCHLORIDE 200 MG/1
200 TABLET ORAL DAILY
Qty: 30 TABLET | Refills: 0 | Status: SHIPPED | OUTPATIENT
Start: 2020-10-19 | End: 2021-06-07

## 2020-10-18 NOTE — PROGRESS NOTES
[place] [facility] - [facility]    Progress Note    Sirisha Berger Patient Status:  Inpatient    1940 MRN [id number]   Location [facility] 3W/SW Attending Zully Madison MD   Hosp Day # 8 PCP Avelino Verde MD        Subjective:     Con diuretics.           Results:     Lab Results   Component Value Date    WBC 9.2 10/18/2020    HGB 12.9 (L) 10/18/2020    HCT 36.8 (L) 10/18/2020    .0 (L) 10/18/2020    CREATSERUM 2.34 (H) 10/18/2020    BUN 63 (H) 10/18/2020     10/18/2020    K

## 2020-10-18 NOTE — PROGRESS NOTES
[facility]    Progress Note    Erin Dutton Patient Status:  Inpatient    1940 MRN [id number]   Location [facility] 3W/SW Attending Tracy Maya MD   [facility] Day # 8 PCP Jacque Lance MD        Subjective:   [place] sepsis   -trend and monitor  -avoid nephrotoxins  -suspect related to hypotension   - appreciate nephro recs     HTN  -monitor  -BP lower side, fluids as needed      Autoimmune hepatitis  CT with cirrhotic morphology   -LFTs much improved   -currently on b

## 2020-10-18 NOTE — PLAN OF CARE
Problem: RISK FOR INFECTION - ADULT  Goal: Absence of fever/infection during anticipated neutropenic period  Description: INTERVENTIONS  - Monitor WBC  - Administer growth factors as ordered  - Implement neutropenic guidelines  Outcome: Progressing     P replacements, including rhythm and repeat lab results as appropriate  - Fluid restriction as ordered  - Instruct patient on fluid and nutrition restrictions as appropriate  Outcome: Progressing  Goal: Hemodynamic stability and optimal renal function mainta

## 2020-10-18 NOTE — PLAN OF CARE
Patient resting in bed. Sat up in chair this morning. Alert & orientedx4. Wife present at bedside. Plan to continue IV bumex. Fluid restriction. Chopra catheter in place. Fall precautions in place. Call light in reach.    Problem: Patient Centered Care  Goal including physical limitations  - Instruct pt to call for assistance with activity based on assessment  - Modify environment to reduce risk of injury  - Provide assistive devices as appropriate  - Consider OT/PT consult to assist with strengthening/mobilit including labs, urine output, blood pressure (other measures as available)  - Encourage oral intake as appropriate  - Instruct patient on fluid and nutrition restrictions as appropriate  Outcome: Progressing

## 2020-10-18 NOTE — PROGRESS NOTES
[facility]    Progress Note    Tricia Aid Patient Status:  Inpatient    1940 MRN [id number]   Location [facility] 3W/SW Attending Romie Novak MD   [facility] Day # 8 PCP Chuy Aguilera MD       Subjective:   Monico Giron abnormal bruising noted  Back/Spine: no abnormalities noted  Musculoskeletal: full ROM all extremities good strength  no deformities  Extremities: no edema, cyanosis  Neurological:  Grossly normal    Results:     Laboratory Data:  Lab Results   Component V

## 2020-10-19 ENCOUNTER — TELEPHONE (OUTPATIENT)
Dept: NEPHROLOGY | Facility: CLINIC | Age: 80
End: 2020-10-19

## 2020-10-19 VITALS
DIASTOLIC BLOOD PRESSURE: 67 MMHG | TEMPERATURE: 97 F | SYSTOLIC BLOOD PRESSURE: 132 MMHG | HEIGHT: 73 IN | BODY MASS INDEX: 33.33 KG/M2 | HEART RATE: 95 BPM | OXYGEN SATURATION: 95 % | WEIGHT: 251.5 LBS | RESPIRATION RATE: 16 BRPM

## 2020-10-19 DIAGNOSIS — N17.9 AKI (ACUTE KIDNEY INJURY) (HCC): Primary | ICD-10-CM

## 2020-10-19 LAB
ANION GAP SERPL CALC-SCNC: 3 MMOL/L
BUN SERPL-MCNC: 54 MG/DL
BUN/CREAT SERPL: 25.5
CALCIUM SERPL-MCNC: 8.6 MG/DL
CHLORIDE SERPL-SCNC: 105 MMOL/L
CO2 SERPL-SCNC: 33 MMOL/L
CREAT SERPL-MCNC: 2.12 MG/DL
GLUCOSE SERPL-MCNC: 111 MG/DL
POTASSIUM SERPL-SCNC: 4.1 MMOL/L
SODIUM SERPL-SCNC: 141 MMOL/L

## 2020-10-19 PROCEDURE — 99239 HOSP IP/OBS DSCHRG MGMT >30: CPT | Performed by: HOSPITALIST

## 2020-10-19 PROCEDURE — 99233 SBSQ HOSP IP/OBS HIGH 50: CPT | Performed by: INTERNAL MEDICINE

## 2020-10-19 RX ORDER — BUMETANIDE 1 MG/1
1 TABLET ORAL 3 TIMES DAILY
Qty: 90 TABLET | Refills: 0 | Status: SHIPPED | OUTPATIENT
Start: 2020-10-19 | End: 2020-11-06 | Stop reason: DRUGHIGH

## 2020-10-19 RX ORDER — SPIRONOLACTONE 25 MG/1
25 TABLET ORAL DAILY
Qty: 30 TABLET | Refills: 0 | Status: SHIPPED | OUTPATIENT
Start: 2020-10-19 | End: 2020-11-09

## 2020-10-19 NOTE — OCCUPATIONAL THERAPY NOTE
OCCUPATIONAL THERAPY TREATMENT NOTE - INPATIENT        Room Number: 322/322-A                Problem List  Principal Problem:    Sepsis due to undetermined organism [facility])  Active Problems:    Acute cystitis without hematuria    AARON (acute kidney injury) (H ACTIVITIES OF DAILY LIVING ASSESSMENT  AM-PAC ‘6-Clicks’ Inpatient Daily Activity Short Form  How much help from another person does the patient currently need…  -   Putting on and taking off regular lower body clothing?: A Little  -   Bathing (rupeshi

## 2020-10-19 NOTE — PROGRESS NOTES
[facility]    Progress Note    Valente Lomeli Patient Status:  Inpatient    1940 MRN [id number]   Location [facility] 3W/SW Attending Angélica Schaffer MD   [facility] Day [facility] PCP Jermain Norwood MD       Subjective:   Alessandro Castellanos noted  Back/Spine: no abnormalities noted  Musculoskeletal: full ROM all extremities good strength  no deformities  Extremities 1+ edema  Neurological:  Grossly normal    Results:     Laboratory Data:  Lab Results   Component Value Date    WBC 9.1 10/19/20

## 2020-10-19 NOTE — PHYSICAL THERAPY NOTE
PHYSICAL THERAPY TREATMENT NOTE - INPATIENT     Room Number: 398/552-G       Presenting Problem: sepsis    Problem List  Principal Problem:    Sepsis due to undetermined organism [facility])  Active Problems:    Acute cystitis without hematuria    AARON (acute kid Static Sitting: Good  Dynamic Sitting: Good           Static Standing: Fair +  Dynamic Standing: Fair +    ACTIVITY TOLERANCE                         O2 WALK                  AM-PAC '6-Clicks' INPATIENT SHORT FORM - BASIC MOBILITY  How much difficulty does Goal #3   Current Status  25'x2 with the RW SBA.     Goal #4 Patient will negotiate one curb w/ assistive device and supervision   Goal #4   Current Status Negotiated 1 platform step with the RW CGA   Goal #5 Patient to demonstrate independence with home

## 2020-10-19 NOTE — PLAN OF CARE
Bumex IVP BID and eliquis continue. Plan for outpatient sleep study in the future. Fluid restriction continues, urine output good, aguiar removed on previous shift. Possible DC home soon. Monitoring.       Problem: RISK FOR INFECTION - ADULT  Goal: Absence o INTERVENTIONS:  - Monitor labs and rhythm and assess patient for signs and symptoms of electrolyte imbalances  - Administer electrolyte replacement as ordered  - Monitor response to electrolyte replacements, including rhythm and repeat lab results as appro

## 2020-10-19 NOTE — PLAN OF CARE
Patient discharged on 10/19. Patient alert and oriented. Belongings gathered and sent with patient. Discharge instructions given. Prescriptions faxed into pharmacy. Tele monitor and IV removed. Patient discharged home with [facility].

## 2020-10-19 NOTE — PROGRESS NOTES
[facility]    Progress Note    Ramona Szymanski Patient Status:  Inpatient    1940 MRN [id number]   Location [facility] 3W/SW Attending Reza Pulliam MD   Hosp Day # 9 PCP Kay Jerez MD        Subjective:     Con contributing, with elevated CVP, though by RHC does not appear to be dominant   - similar treatment with diuretic management would ensue  - Patient still has significant fluid excess on exsam, though is adamant about going home today  - if he definit

## 2020-10-19 NOTE — CM/SW NOTE
Received MDO for [facility] orders. HH instructions added to pt's AVS.    PLAN: Home w/ [facility]    SW/CM to remain available for support and/or discharge planning.        Pradeep Velazquez, [facility]

## 2020-10-19 NOTE — PLAN OF CARE
Problem: Patient Centered Care  Goal: Patient preferences are identified and integrated in the patient's plan of care  Description: Interventions:  - What would you like us to know as we care for you? I was pretty healthy up until May or June.   - Provide assist with strengthening/mobility  - Encourage toileting schedule  Outcome: Progressing     Problem: RESPIRATORY - ADULT  Goal: Achieves optimal ventilation and oxygenation  Description: INTERVENTIONS:  - Assess for changes in respiratory status  - Assess by physical therapy. Sitting up in chair. Receiving IV bumex. On fluid restrictions.

## 2020-10-19 NOTE — DIETARY NOTE
NUTRITION EDUCATION NOTE    Received pt request for consult for nutrition education. Appropriate education and handout provided. See education section of Epic for specifics.     Dianna House RD,LDN  FOREST.-[phone]

## 2020-10-20 ENCOUNTER — TELEPHONE (OUTPATIENT)
Dept: CARDIOLOGY | Age: 80
End: 2020-10-20

## 2020-10-20 ENCOUNTER — PATIENT OUTREACH (OUTPATIENT)
Dept: CASE MANAGEMENT | Age: 80
End: 2020-10-20

## 2020-10-20 ENCOUNTER — TELEPHONE (OUTPATIENT)
Dept: INTERNAL MEDICINE CLINIC | Facility: CLINIC | Age: 80
End: 2020-10-20

## 2020-10-20 DIAGNOSIS — Z02.9 ENCOUNTERS FOR ADMINISTRATIVE PURPOSE: ICD-10-CM

## 2020-10-20 PROCEDURE — 1111F DSCHRG MED/CURRENT MED MERGE: CPT

## 2020-10-20 NOTE — TELEPHONE ENCOUNTER
JACKIE on home phone (ok per HIPAA) relaying that Dr. Maris Narvaez is not in today and that I was calling to see how patient was feeling or if there was any questions/concerns I could pass on to Dr. Beau Gillis that patient does have a post hospital follow up scheduled for

## 2020-10-20 NOTE — DISCHARGE SUMMARY
[facility]    PATIENT'S NAME: PARI GUERRERO   ATTENDING PHYSICIAN: Tanvir Rose MD   PATIENT ACCOUNT#:   [id number]    LOCATION:  [address] [place] [address] RECORD #:   [id number]       YOB: 1940  ADMISSION DATE:       10/09/20 output. The patient has lower extremity edema that is still persistent, but improving. The patient's heparin drip was discontinued and the patient was started on low-dose Eliquis. The patient's cardiac medications have been adjusted per Cardiology.   Aft

## 2020-10-20 NOTE — TELEPHONE ENCOUNTER
Spoke with p'ts wife and answered questions about Res HH and HH in general.  At this time wife/pt feel they do not need any HH services.     [facility] Varsha Call[facility]

## 2020-10-20 NOTE — TELEPHONE ENCOUNTER
Patient's wife Chicho Buneo is calling patient was discharged from the hospital on 10/19  He was contacted by [facility], she would like to discuss what Dr GUSTAFSON thinks of the patient using them    Please call [phone]

## 2020-10-21 ENCOUNTER — TELEPHONE (OUTPATIENT)
Dept: INTERNAL MEDICINE CLINIC | Facility: CLINIC | Age: 80
End: 2020-10-21

## 2020-10-21 NOTE — TELEPHONE ENCOUNTER
Telephone call to patient and situation discussed. Patient recently had a complicated hospitalization and was released yesterday. Patient's mouth is very dry. Patient is having a very good diuresis with his current medications.   He is currently taking B

## 2020-10-21 NOTE — TELEPHONE ENCOUNTER
NC s/w patient for TCM. He states that he is worried he is taking too many diuretics and that \"they jumped in too fast with the diet, meds and fluid restriction\". . He states that he is very thirsty and finds it very difficult to follow the 64oz fluid re

## 2020-10-21 NOTE — PROGRESS NOTES
Initial Post Discharge Follow Up   Discharge Date: 10/19/20  Contact Date: 10/20/2020    Consent Verification:  Assessment Completed With: Patient  HIPAA Verified?   Yes    Discharge Dx:  Sepsis due to undetermined organism        General:   • How have y daily. 30 tablet 0   • apixaban 2.5 MG Oral Tab Take 1 tablet (2.5 mg total) by mouth 2 (two) times daily. 60 tablet 0   • Metoprolol Succinate ER 25 MG Oral Tablet 24 Hr Take 1 tablet (25 mg total) by mouth nightly.  30 tablet 0   • Midodrine HCl 5 MG Oral any reason you are unable to weigh yourself daily? No  What was your weight yesterday? 245 lbs   Today? 239 lbs  Were you told about any fluid restrictions?  Yes, no more than 64 oz  Have you noticed any shortness of breath or waking up short of breath? no Reviewed upcoming Specialist Appt with patient     Yes       Interventions by DAHLIA: DAHLIA reviewed discharge instructions and when to seek medical attention with the patient.  He states that he is feeling fine but is worried that \"they\" jumped into the meds,

## 2020-10-22 ENCOUNTER — OFFICE VISIT (OUTPATIENT)
Dept: CARDIOLOGY CLINIC | Facility: HOSPITAL | Age: 80
End: 2020-10-22
Attending: NURSE PRACTITIONER
Payer: MEDICARE

## 2020-10-22 VITALS
DIASTOLIC BLOOD PRESSURE: 69 MMHG | SYSTOLIC BLOOD PRESSURE: 135 MMHG | WEIGHT: 244 LBS | BODY MASS INDEX: 32 KG/M2 | HEART RATE: 85 BPM | OXYGEN SATURATION: 97 %

## 2020-10-22 DIAGNOSIS — I50.9 HEART FAILURE, UNSPECIFIED (HCC): ICD-10-CM

## 2020-10-22 DIAGNOSIS — K75.4 AUTOIMMUNE HEPATITIS (HCC): Chronic | ICD-10-CM

## 2020-10-22 DIAGNOSIS — E87.79 OTHER HYPERVOLEMIA: ICD-10-CM

## 2020-10-22 DIAGNOSIS — I48.19 PERSISTENT ATRIAL FIBRILLATION (HCC): Chronic | ICD-10-CM

## 2020-10-22 DIAGNOSIS — N17.9 AKI (ACUTE KIDNEY INJURY) (HCC): ICD-10-CM

## 2020-10-22 DIAGNOSIS — I50.33 ACUTE ON CHRONIC HEART FAILURE WITH PRESERVED EJECTION FRACTION (HFPEF) (HCC): Primary | Chronic | ICD-10-CM

## 2020-10-22 PROCEDURE — 83880 ASSAY OF NATRIURETIC PEPTIDE: CPT | Performed by: NURSE PRACTITIONER

## 2020-10-22 PROCEDURE — 99212 OFFICE O/P EST SF 10 MIN: CPT | Performed by: NURSE PRACTITIONER

## 2020-10-22 PROCEDURE — 93010 ELECTROCARDIOGRAM REPORT: CPT | Performed by: NURSE PRACTITIONER

## 2020-10-22 PROCEDURE — 93005 ELECTROCARDIOGRAM TRACING: CPT

## 2020-10-22 PROCEDURE — 99214 OFFICE O/P EST MOD 30 MIN: CPT | Performed by: NURSE PRACTITIONER

## 2020-10-22 PROCEDURE — 80048 BASIC METABOLIC PNL TOTAL CA: CPT | Performed by: NURSE PRACTITIONER

## 2020-10-22 PROCEDURE — 36415 COLL VENOUS BLD VENIPUNCTURE: CPT | Performed by: NURSE PRACTITIONER

## 2020-10-22 NOTE — PROGRESS NOTES
[facility] Patient Status:  No patient class for patient encounter    1940 MRN [id number]   Location [facility] MD Dr. Shavonne Cadena Dr. is a orthopnea and palpitations  Gastrointestinal: negative for abdominal pain, diarrhea, melena, nausea and vomiting  Hematologic/lymphatic: negative  Musculoskeletal: negative for myalgias    Objective:  Lab Results   Component Value Date/Time    WBC 9.1 10/1 symmetric  Neurologic: Grossly normal    Diagnostics:  ECG: 10/22/20 atrial fib 78 bpm, qtc 449 ms .   ECG: 10/9/ 2020 atrial fib with RVR  Echocardiogram: 10/10/20 , EF 55-60%, no wma, grade 1 diastolic dysfunction, Left  atrial dilation, mild AI and  MR, 11/3/20   - follow up with the specialty care clinic in 1 month     Atrial fib with RVR,  -rate controlled  on amiodarone 200 mg daily and toprol 25 mg daily  - anticoagulated on low dose eliquis 2.5 mg daily   - EKG today atrial fib 78 bpm, qtc stable  - tablet, Rfl: 0  •  Metoprolol Succinate ER 25 MG Oral Tablet 24 Hr, Take 1 tablet (25 mg total) by mouth nightly., Disp: 30 tablet, Rfl: 0  •  Midodrine HCl 5 MG Oral Tab, Take 1 tablet (5 mg total) by mouth 3 (three) times daily. , Disp: 90 tablet, Rfl: 0

## 2020-10-22 NOTE — PATIENT INSTRUCTIONS
Continue all your same medications    Call if having any dizziness, lightheadedness, heart racing, palpitations, chest pain, shortness of breath, coughing,  wheezing, swelling, weight gain, increased fatigue  or weakness    Weigh yourself daily in the [facility]

## 2020-10-23 ENCOUNTER — TELEPHONE (OUTPATIENT)
Dept: INTERNAL MEDICINE CLINIC | Facility: CLINIC | Age: 80
End: 2020-10-23

## 2020-10-23 NOTE — TELEPHONE ENCOUNTER
Telephone call to patient. At this time patient does not feel he needs a visiting nurse. He will discuss this with me next week.

## 2020-10-23 NOTE — TELEPHONE ENCOUNTER
Start of care is delayed until 10/30/20  Pt would like to discuss with Dr Kyle Jane at his appt first  fyi for Dr Kyle Jane  Tasked to nursing

## 2020-10-26 ENCOUNTER — OFFICE VISIT (OUTPATIENT)
Dept: INTERNAL MEDICINE CLINIC | Facility: CLINIC | Age: 80
End: 2020-10-26
Payer: MEDICARE

## 2020-10-26 VITALS
BODY MASS INDEX: 30.72 KG/M2 | DIASTOLIC BLOOD PRESSURE: 68 MMHG | HEIGHT: 73 IN | TEMPERATURE: 98 F | SYSTOLIC BLOOD PRESSURE: 144 MMHG | HEART RATE: 62 BPM | WEIGHT: 231.81 LBS | RESPIRATION RATE: 16 BRPM | OXYGEN SATURATION: 98 %

## 2020-10-26 DIAGNOSIS — N17.9 ACUTE KIDNEY INJURY (HCC): ICD-10-CM

## 2020-10-26 DIAGNOSIS — K57.30 DIVERTICULOSIS OF COLON: ICD-10-CM

## 2020-10-26 DIAGNOSIS — R65.20 SEPSIS WITH ACUTE RENAL FAILURE WITHOUT SEPTIC SHOCK, DUE TO UNSPECIFIED ORGANISM, UNSPECIFIED ACUTE RENAL FAILURE TYPE (HCC): Primary | ICD-10-CM

## 2020-10-26 DIAGNOSIS — N40.1 BENIGN PROSTATIC HYPERPLASIA WITH LOWER URINARY TRACT SYMPTOMS, SYMPTOM DETAILS UNSPECIFIED: ICD-10-CM

## 2020-10-26 DIAGNOSIS — N17.9 SEPSIS WITH ACUTE RENAL FAILURE WITHOUT SEPTIC SHOCK, DUE TO UNSPECIFIED ORGANISM, UNSPECIFIED ACUTE RENAL FAILURE TYPE (HCC): Primary | ICD-10-CM

## 2020-10-26 DIAGNOSIS — M15.9 PRIMARY OSTEOARTHRITIS INVOLVING MULTIPLE JOINTS: ICD-10-CM

## 2020-10-26 DIAGNOSIS — E78.9 BORDERLINE HIGH CHOLESTEROL: ICD-10-CM

## 2020-10-26 DIAGNOSIS — D69.6 THROMBOCYTOPENIA (HCC): ICD-10-CM

## 2020-10-26 DIAGNOSIS — K75.4 AUTOIMMUNE HEPATITIS (HCC): ICD-10-CM

## 2020-10-26 DIAGNOSIS — I48.19 PERSISTENT ATRIAL FIBRILLATION (HCC): ICD-10-CM

## 2020-10-26 DIAGNOSIS — R53.83 FATIGUE, UNSPECIFIED TYPE: ICD-10-CM

## 2020-10-26 DIAGNOSIS — R73.01 ABNORMAL FASTING GLUCOSE: ICD-10-CM

## 2020-10-26 DIAGNOSIS — A41.9 SEPSIS WITH ACUTE RENAL FAILURE WITHOUT SEPTIC SHOCK, DUE TO UNSPECIFIED ORGANISM, UNSPECIFIED ACUTE RENAL FAILURE TYPE (HCC): Primary | ICD-10-CM

## 2020-10-26 DIAGNOSIS — I10 ESSENTIAL HYPERTENSION: ICD-10-CM

## 2020-10-26 PROCEDURE — 1111F DSCHRG MED/CURRENT MED MERGE: CPT | Performed by: INTERNAL MEDICINE

## 2020-10-26 PROCEDURE — 99496 TRANSJ CARE MGMT HIGH F2F 7D: CPT | Performed by: INTERNAL MEDICINE

## 2020-10-26 NOTE — PROGRESS NOTES
Chantal Schreiber is a 79year old male. Patient presents with:  TCM (Transition Of Care Management): TCM patient present for hospital f/u on 10/9/20 for sepsis.    Fever  Renal Insufficiency  Autoimmune Hepatitis    HPI:   Patient presents with:  TCM (Meridee Albe total) by mouth daily. 30 tablet 0   • amiodarone HCl 200 MG Oral Tab Take 1 tablet (200 mg total) by mouth daily. 30 tablet 0   • apixaban 2.5 MG Oral Tab Take 1 tablet (2.5 mg total) by mouth 2 (two) times daily.  60 tablet 0   • Metoprolol Succinate ER 2 without murmur   GI:Protuberant, BS are present, no organomegaly or palpable masses  EXTREMITIES: no edema  NEURO: alert and oriented  ASSESSMENT AND PLAN:   1.  Sepsis with acute renal failure without septic shock, due to unspecified organism, unspecified hospital follow up.    He was discharged from Inpatient hospital, [facility]  to Home   Admission Date: 10/9/20   Discharge Date: 10/19/20  Hospital Discharge Diagnoses (since 9/26/2020)   None      Interactive contact within 2 business days post dis Over the course of the last week patient is slowly improving. Allergies:  He has No Known Allergies. Current Meds:    •  bumetanide 1 MG Oral Tab, Take 1 tablet (1 mg total) by mouth 3 (three) times daily.     •  spironolactone 25 MG Oral Tab, Take 1 shortness of breath with exertion  CARDIOVASCULAR: denies chest pain on exertion or palpitations  GI: denies abdominal pain, denies heartburn, denies diarrhea  MUSCULOSKELETAL: denies pain, normal range of motion of extremities  NEURO: denies headaches, de type    Diverticulosis of colon    Persistent atrial fibrillation (HCC)    Thrombocytopenia (HCC)        No orders of the defined types were placed in this encounter.       Meds & Refills for this Visit:  Requested Prescriptions      No prescriptions reques

## 2020-10-29 ENCOUNTER — LAB ENCOUNTER (OUTPATIENT)
Dept: LAB | Age: 80
End: 2020-10-29
Attending: INTERNAL MEDICINE
Payer: MEDICARE

## 2020-10-29 ENCOUNTER — TELEPHONE (OUTPATIENT)
Dept: NEPHROLOGY | Facility: CLINIC | Age: 80
End: 2020-10-29

## 2020-10-29 DIAGNOSIS — N17.9 AKI (ACUTE KIDNEY INJURY) (HCC): ICD-10-CM

## 2020-10-29 DIAGNOSIS — A41.9 SEPSIS DUE TO UNDETERMINED ORGANISM (HCC): ICD-10-CM

## 2020-10-29 LAB
ALBUMIN SERPL-MCNC: 3.7 G/DL
ALBUMIN/GLOB SERPL: 0.9 {RATIO}
ALP SERPL-CCNC: 92 U/L
ALT SERPL-CCNC: 79 UNITS/L
ANION GAP SERPL CALC-SCNC: 5 MMOL/L
AST SERPL-CCNC: 54 UNITS/L
BILIRUB SERPL-MCNC: 2.1 MG/DL
BUN SERPL-MCNC: 53 MG/DL
BUN/CREAT SERPL: 22.6
CALCIUM SERPL-MCNC: 9.8 MG/DL
CHLORIDE SERPL-SCNC: 99 MMOL/L
CO2 SERPL-SCNC: 32 MMOL/L
CREAT SERPL-MCNC: 2.35 MG/DL
GLOBULIN SER-MCNC: 3.9 G/DL
GLUCOSE SERPL-MCNC: 121 MG/DL
POTASSIUM SERPL-SCNC: 4.6 MMOL/L
PROT SERPL-MCNC: 7.6 G/DL
SODIUM SERPL-SCNC: 136 MMOL/L

## 2020-10-29 PROCEDURE — 36415 COLL VENOUS BLD VENIPUNCTURE: CPT

## 2020-10-29 PROCEDURE — 85025 COMPLETE CBC W/AUTO DIFF WBC: CPT

## 2020-10-29 PROCEDURE — 80053 COMPREHEN METABOLIC PANEL: CPT

## 2020-10-29 NOTE — TELEPHONE ENCOUNTER
Patient had BMP drawn on 10/22/2020 from another provider. There is a BMP order in the chart from 10/19 ordered by Dr. Benigno Stubbs. Lab is asking if patient needs to do it again? They will draw the CBC. Encounter routed to Dr. Benigno Stubbs.

## 2020-10-29 NOTE — PROCEDURES
[facility]     [facility] Cardiac Cath Procedure Note        Regan Moises Patient Status:  Inpatient    1940 MRN [id number]   Location [facility] Attending Kaylan Muniz MD   [facility] Day # 5 PCP Irl Floor saturations. Thermodilution was also performed. Next the [place] catheter was then removed. Hemostasis to the right internal jugular access site was obtained using manual pressure.   The patient was then transferred to the cardiac catheterization labor

## 2020-10-30 ENCOUNTER — HOSPITAL ENCOUNTER (EMERGENCY)
Facility: HOSPITAL | Age: 80
Discharge: ANOTHER HOSPITAL | End: 2020-10-30
Attending: EMERGENCY MEDICINE
Payer: MEDICARE

## 2020-10-30 ENCOUNTER — APPOINTMENT (OUTPATIENT)
Dept: CT IMAGING | Facility: HOSPITAL | Age: 80
End: 2020-10-30
Attending: EMERGENCY MEDICINE
Payer: MEDICARE

## 2020-10-30 ENCOUNTER — PATIENT MESSAGE (OUTPATIENT)
Dept: INTERNAL MEDICINE CLINIC | Facility: CLINIC | Age: 80
End: 2020-10-30

## 2020-10-30 VITALS
RESPIRATION RATE: 19 BRPM | HEART RATE: 67 BPM | SYSTOLIC BLOOD PRESSURE: 126 MMHG | TEMPERATURE: 99 F | DIASTOLIC BLOOD PRESSURE: 70 MMHG | WEIGHT: 231.5 LBS | BODY MASS INDEX: 31 KG/M2 | OXYGEN SATURATION: 98 %

## 2020-10-30 DIAGNOSIS — G40.909 SEIZURE CEREBRAL (HCC): ICD-10-CM

## 2020-10-30 DIAGNOSIS — I62.9 INTRACRANIAL HEMORRHAGE (HCC): Primary | ICD-10-CM

## 2020-10-30 LAB
ERYTHROCYTE [DISTWIDTH] IN BLOOD BY AUTOMATED COUNT: 56.2 %
ERYTHROCYTE [DISTWIDTH] IN BLOOD: 15.9 %
HCT VFR BLD CALC: 45.2 %
HGB BLD-MCNC: 15.6 G/DL
MCH RBC QN AUTO: 33.4 PG
MCHC RBC AUTO-ENTMCNC: 34.5 G/DL
MCV RBC AUTO: 96.8 FL
PLATELET # BLD: 103 K/MCL
RBC # BLD: 4.67 10*6/UL
WBC # BLD: 12 K/MCL

## 2020-10-30 PROCEDURE — 85025 COMPLETE CBC W/AUTO DIFF WBC: CPT | Performed by: EMERGENCY MEDICINE

## 2020-10-30 PROCEDURE — 85730 THROMBOPLASTIN TIME PARTIAL: CPT | Performed by: EMERGENCY MEDICINE

## 2020-10-30 PROCEDURE — 80076 HEPATIC FUNCTION PANEL: CPT | Performed by: EMERGENCY MEDICINE

## 2020-10-30 PROCEDURE — 82962 GLUCOSE BLOOD TEST: CPT

## 2020-10-30 PROCEDURE — 93010 ELECTROCARDIOGRAM REPORT: CPT | Performed by: EMERGENCY MEDICINE

## 2020-10-30 PROCEDURE — 82140 ASSAY OF AMMONIA: CPT | Performed by: EMERGENCY MEDICINE

## 2020-10-30 PROCEDURE — 96365 THER/PROPH/DIAG IV INF INIT: CPT

## 2020-10-30 PROCEDURE — 99291 CRITICAL CARE FIRST HOUR: CPT

## 2020-10-30 PROCEDURE — 85610 PROTHROMBIN TIME: CPT | Performed by: EMERGENCY MEDICINE

## 2020-10-30 PROCEDURE — 93005 ELECTROCARDIOGRAM TRACING: CPT

## 2020-10-30 PROCEDURE — 96366 THER/PROPH/DIAG IV INF ADDON: CPT

## 2020-10-30 PROCEDURE — 80048 BASIC METABOLIC PNL TOTAL CA: CPT | Performed by: EMERGENCY MEDICINE

## 2020-10-30 PROCEDURE — 70450 CT HEAD/BRAIN W/O DYE: CPT | Performed by: EMERGENCY MEDICINE

## 2020-10-30 RX ORDER — BUMETANIDE 1 MG/1
1 TABLET ORAL 3 TIMES DAILY
COMMUNITY
Start: 2020-10-19 | End: 2020-10-30 | Stop reason: SDUPTHER

## 2020-10-30 RX ORDER — SODIUM CHLORIDE 9 MG/ML
INJECTION, SOLUTION INTRAVENOUS ONCE
Status: COMPLETED | OUTPATIENT
Start: 2020-10-30 | End: 2020-10-30

## 2020-10-30 RX ORDER — BUDESONIDE 3 MG/1
3 CAPSULE, COATED PELLETS ORAL
COMMUNITY
Start: 2020-10-12

## 2020-10-30 RX ORDER — MIDODRINE HYDROCHLORIDE 5 MG/1
5 TABLET ORAL 3 TIMES DAILY
COMMUNITY
Start: 2020-10-18 | End: 2020-11-17

## 2020-10-30 RX ORDER — BUMETANIDE 0.5 MG/1
0.5 TABLET ORAL DAILY
COMMUNITY
Start: 2020-10-19

## 2020-10-30 RX ORDER — SPIRONOLACTONE 25 MG/1
12.5 TABLET, FILM COATED ORAL DAILY
COMMUNITY
Start: 2020-10-19

## 2020-10-30 RX ORDER — METOPROLOL SUCCINATE 25 MG/1
25 TABLET, EXTENDED RELEASE ORAL NIGHTLY
COMMUNITY
Start: 2020-10-18 | End: 2020-11-17

## 2020-10-30 RX ORDER — AMIODARONE HYDROCHLORIDE 200 MG/1
200 TABLET ORAL DAILY
COMMUNITY
Start: 2020-10-19 | End: 2020-11-13 | Stop reason: SDUPTHER

## 2020-10-30 NOTE — ED INITIAL ASSESSMENT (HPI)
Per wife, pt had syncopal episode where he was 'unresponsive' x 1 minute. Pt has no recollection.  Pt was recently admitted for 'liver problems'

## 2020-10-30 NOTE — CM/SW NOTE
76 year old  female with pmhx of CHF (last echo 3/2017: EF 65%), Afib on coumadin, Sick sinus syndrome s/p PPM, DM, syncope,  and Tachy-tim syndrome presents to the ED with complaint of dizziness with SOB, orthopnea and PND,  concerning for cardiac cause considering hx of CAD, CHF, Afib, SSS s/p PPM vs dehydration given hx of decreased PO intake with SOFIA BUN/Cr 21/1.33 on admission. Pro-BNP is increased at 1238. Pt's last echo in [facility] records in 3/2015 shows EF of 65%. Admitted to  medicine for further evaluation and management. Repeat echo with normal EF, device interrogated and functioning appropriately. SOFIA resolved, dizziness resolved. Pt noted to have recurrent hypoglycemia in 50-60s, not getting any insulin here, endocrine consulted and hypoglycemia work up obtained, insulin mildly elevated, US abdomen no pancreatic mass.     Pre-syncope: Resolved   - Questionable etiology, likely from dehydration but has  hx of cardiac disease.  - CXR negative, electrolytes normal, troponin negative X 3, TTE with normal EF, device interrogated and functioning appropriately. No stenosis on carotid U/S. Cardio note appreciated, deemed stable from cardiac stand point. D/c telemetry.      SOFIA:  - Likely from dehydration. Resolved.    Hx of DM, now hypoglycemia:  - Hypoglycemic last night again to 50s, s/p dextrose. AIC 5.5. FS below 100  - Endocrine note appreciated, being work up for hypoglycemia. Insulin level mildly elevated, C- peptide, beta hydroxybutyrate and cortisol levels normal, proinsulin and sulfonylurea screen in process. US abdomen no pancreatic mass. Repeat insulin level per endocrine. Needs endocrine follow up. Not stable for discharge at this time due top hypoglycemia.   - Hold oral hypoglycemic drugs while inpatient    R/o CHF:  - Pt came in with SOB, orthopnea, PND with mildly elevated BNP  - EF normal in 2015. Repeat TTE with normal EF  - Cardiology note appreciated.    Hx of A fib:  - Today's INR pending, follow INR and order coumadin accordingly.  - c/w metoprolol and dig, repeat INR am.    Hx of SSS, tachy-tim syndrome s/p PPM:  - Device interrogated, functioning appropriately.      HTN:  - On lisinopril    HLD:  - On Lipitor    DVT ppx:  - On Coumadin   - Plan of care discussed with patient in details. Per request from EDMD, called [facility] at [phone] and spoke with [facility] regarding possible transfer to [facility]. Face Sheet faxed to:  [phone]. Wagner call transferred to Dr. Franck Wagner. Waiting further instructions.

## 2020-10-30 NOTE — CM/SW NOTE
Dr Roc Colon spoke with neuro intenivist at [address] stroke navigator at [facility] calling Dr Steve Peres

## 2020-10-30 NOTE — CM/SW NOTE
Received call from [facility], Stroke Navigator at [address], confirming that Dr. Riki Box, Neuro Intensivist, has requested a Lumbar Puncture of patient prior to transfer[facility]

## 2020-10-30 NOTE — CM/SW NOTE
Called Stroke Navigator and spoke with Ave LIANG, regarding possible transfer to THE [facility] for patient. Details given to [facility]. Ave LIANG stated that she would notifiy the [facility] at [phone]. Waiting for accepting physician.

## 2020-10-30 NOTE — ED NOTES
Spoke to Melvina at [employer] who will set up transport. [phone] for nurse to nurse. Room number [address]   Dr Manoj Cruz accepting

## 2020-10-30 NOTE — CM/SW NOTE
EDCM called and cancelled [facility] ambulance on \"will call\" to go to THE [facility].

## 2020-11-02 ENCOUNTER — TELEPHONE (OUTPATIENT)
Dept: NEPHROLOGY | Facility: CLINIC | Age: 80
End: 2020-11-02

## 2020-11-02 ENCOUNTER — TELEPHONE (OUTPATIENT)
Dept: INTERNAL MEDICINE CLINIC | Facility: CLINIC | Age: 80
End: 2020-11-02

## 2020-11-02 RX ORDER — LEVETIRACETAM 500 MG/1
500 TABLET ORAL EVERY 12 HOURS
COMMUNITY
Start: 2020-10-31

## 2020-11-02 NOTE — TELEPHONE ENCOUNTER
Telephone call to patient and patient's wife and situation discussed. Patient was in the emergency room at [facility] on Friday, October 30, for an apparent seizure. Patient was felt to possibly have a cerebral aneurysm with bleed. He was transferred to [facility] ER. Wagner ran tests and determined he did not have a intracerebral bleed. Apparently does have an aneurysm that he \"may have had for some time\". Patient is now home. Patient feels well except he is tired. He is currently on a new medication that is an antiseizure medication. I have asked patient to come in to see me on Friday, November 6 at 11:00 in the morning. I will forward this message to the  and to my schedule for Friday at 11:00 in the morning.   Thank you!!

## 2020-11-02 NOTE — TELEPHONE ENCOUNTER
Encounter routed to Dr. Chris Beverly. First available is 11/19/2020. Please advise on appointment. Thanks.

## 2020-11-02 NOTE — TELEPHONE ENCOUNTER
Wife states she cancelled pts appt for today on Friday due to pt being in the hospital. Wife states pt returned home and is requesting an appt sooner than first available.  Please call [phone]

## 2020-11-02 NOTE — TELEPHONE ENCOUNTER
From: Shantell Castañeda  To:  Moo Russo MD  Sent: 10/30/2020 5:01 PM CDT  Subject: Other    I wanted you to know Brandi Camargo had a seizure this morning and was taken by ambulance to the ER at [place] where it was determined by CT scan he has a probable brain

## 2020-11-03 ENCOUNTER — OFFICE VISIT (OUTPATIENT)
Dept: CARDIOLOGY | Age: 80
End: 2020-11-03

## 2020-11-03 ENCOUNTER — APPOINTMENT (OUTPATIENT)
Dept: CARDIOLOGY | Age: 80
End: 2020-11-03

## 2020-11-03 VITALS
BODY MASS INDEX: 29.03 KG/M2 | DIASTOLIC BLOOD PRESSURE: 62 MMHG | WEIGHT: 219 LBS | HEIGHT: 73 IN | HEART RATE: 64 BPM | OXYGEN SATURATION: 98 % | SYSTOLIC BLOOD PRESSURE: 112 MMHG

## 2020-11-03 DIAGNOSIS — I10 ESSENTIAL HYPERTENSION: ICD-10-CM

## 2020-11-03 DIAGNOSIS — I50.33 ACUTE ON CHRONIC HEART FAILURE WITH PRESERVED EJECTION FRACTION (CMD): ICD-10-CM

## 2020-11-03 DIAGNOSIS — I48.19 PERSISTENT ATRIAL FIBRILLATION (CMD): Primary | ICD-10-CM

## 2020-11-03 PROCEDURE — 99205 OFFICE O/P NEW HI 60 MIN: CPT | Performed by: NURSE PRACTITIONER

## 2020-11-03 SDOH — HEALTH STABILITY: MENTAL HEALTH: HOW OFTEN DO YOU HAVE A DRINK CONTAINING ALCOHOL?: NEVER

## 2020-11-03 ASSESSMENT — PATIENT HEALTH QUESTIONNAIRE - PHQ9
SUM OF ALL RESPONSES TO PHQ9 QUESTIONS 1 AND 2: 0
CLINICAL INTERPRETATION OF PHQ2 SCORE: NO FURTHER SCREENING NEEDED
1. LITTLE INTEREST OR PLEASURE IN DOING THINGS: NOT AT ALL
SUM OF ALL RESPONSES TO PHQ9 QUESTIONS 1 AND 2: 0
2. FEELING DOWN, DEPRESSED OR HOPELESS: NOT AT ALL
CLINICAL INTERPRETATION OF PHQ9 SCORE: NO FURTHER SCREENING NEEDED

## 2020-11-06 ENCOUNTER — OFFICE VISIT (OUTPATIENT)
Dept: INTERNAL MEDICINE CLINIC | Facility: CLINIC | Age: 80
End: 2020-11-06
Payer: MEDICARE

## 2020-11-06 VITALS
TEMPERATURE: 98 F | OXYGEN SATURATION: 98 % | SYSTOLIC BLOOD PRESSURE: 118 MMHG | DIASTOLIC BLOOD PRESSURE: 76 MMHG | HEIGHT: 73 IN | WEIGHT: 210 LBS | BODY MASS INDEX: 27.83 KG/M2 | HEART RATE: 64 BPM

## 2020-11-06 DIAGNOSIS — R56.9 SEIZURE (HCC): ICD-10-CM

## 2020-11-06 DIAGNOSIS — R53.83 FATIGUE, UNSPECIFIED TYPE: ICD-10-CM

## 2020-11-06 DIAGNOSIS — N18.30 STAGE 3 CHRONIC KIDNEY DISEASE, UNSPECIFIED WHETHER STAGE 3A OR 3B CKD (HCC): ICD-10-CM

## 2020-11-06 DIAGNOSIS — N17.9 ACUTE KIDNEY INJURY (HCC): ICD-10-CM

## 2020-11-06 DIAGNOSIS — I10 ESSENTIAL HYPERTENSION: ICD-10-CM

## 2020-11-06 DIAGNOSIS — I48.19 PERSISTENT ATRIAL FIBRILLATION (HCC): ICD-10-CM

## 2020-11-06 DIAGNOSIS — I67.1 CEREBRAL ANEURYSM: Primary | ICD-10-CM

## 2020-11-06 DIAGNOSIS — D69.6 THROMBOCYTOPENIA (HCC): ICD-10-CM

## 2020-11-06 DIAGNOSIS — M15.9 PRIMARY OSTEOARTHRITIS INVOLVING MULTIPLE JOINTS: ICD-10-CM

## 2020-11-06 DIAGNOSIS — K75.4 AUTOIMMUNE HEPATITIS (HCC): ICD-10-CM

## 2020-11-06 DIAGNOSIS — E78.00 HYPERCHOLESTEROLEMIA: ICD-10-CM

## 2020-11-06 PROCEDURE — G0463 HOSPITAL OUTPT CLINIC VISIT: HCPCS | Performed by: INTERNAL MEDICINE

## 2020-11-06 PROCEDURE — 99214 OFFICE O/P EST MOD 30 MIN: CPT | Performed by: INTERNAL MEDICINE

## 2020-11-06 RX ORDER — LEVETIRACETAM 500 MG/1
500 TABLET ORAL EVERY 12 HOURS
COMMUNITY
Start: 2020-10-31 | End: 2021-04-07

## 2020-11-06 RX ORDER — BUDESONIDE 3 MG/1
6 CAPSULE, COATED PELLETS ORAL
COMMUNITY
Start: 2020-10-12 | End: 2020-11-06 | Stop reason: DRUGHIGH

## 2020-11-06 RX ORDER — BUMETANIDE 1 MG/1
1 TABLET ORAL 2 TIMES DAILY
COMMUNITY
Start: 2020-10-19 | End: 2020-11-09

## 2020-11-06 RX ORDER — MIDODRINE HYDROCHLORIDE 5 MG/1
5 TABLET ORAL
COMMUNITY
Start: 2020-10-18 | End: 2020-11-06 | Stop reason: DRUGHIGH

## 2020-11-06 NOTE — PATIENT INSTRUCTIONS
1.  Patient is to continue his current diet, medications and activity. 2.  Patient is to continue take his Bumex 1 mg orally twice daily. 3.  Patient may loosen up on his diet regarding his sodium restriction.   He may also advance his diet to patient may

## 2020-11-06 NOTE — PROGRESS NOTES
Ramona Szymanski is a 79year old male. Patient presents with:  Checkup: Here today to checkup with PCP s/p 10/30 ER ([facility]) - Transfert to [facility] [address] for Brain Aneurysm found on imaging after episode of non responsiness after headache in shower.  Wife found him and he is able to be released from the hospital.  Patient was started on Keppra 500 mg twice a day to cover him for possible seizure disorder. It sounds l as though his episode of staring and not responding may well have been a seizure.   Patient is feels 6/15/1968        Years since quittin.4      Smokeless tobacco: Never Used    Alcohol use: Not Currently      Comment: hard liquor 1 drink monthly    Drug use: No       REVIEW OF SYSTEMS:   GENERAL HEALTH: feels well otherwise  RESPIRATORY:No cough or in about 2 weeks. 2. Seizure [facility])  Patient had episode last Friday where the patient had severe headache. The episode that was followed by an episode of the patient staring blankly of forward and not responding to his wife.   Patient was brought to the Primary osteoarthritis involving multiple joints  Stable. CPM.      The patient indicates understanding of these issues and agrees to the plan. The patient is asked to return in 2 weeks as noted above. Eneida Villagomez MD  11/6/2020  11:25 AM

## 2020-11-09 ENCOUNTER — OFFICE VISIT (OUTPATIENT)
Dept: NEPHROLOGY | Facility: CLINIC | Age: 80
End: 2020-11-09
Payer: MEDICARE

## 2020-11-09 VITALS
DIASTOLIC BLOOD PRESSURE: 71 MMHG | SYSTOLIC BLOOD PRESSURE: 118 MMHG | WEIGHT: 219 LBS | HEIGHT: 73 IN | HEART RATE: 71 BPM | BODY MASS INDEX: 29.03 KG/M2 | TEMPERATURE: 98 F

## 2020-11-09 DIAGNOSIS — N18.30 STAGE 3 CHRONIC KIDNEY DISEASE, UNSPECIFIED WHETHER STAGE 3A OR 3B CKD (HCC): ICD-10-CM

## 2020-11-09 DIAGNOSIS — I50.30 HEART FAILURE WITH PRESERVED EJECTION FRACTION, UNSPECIFIED HF CHRONICITY (HCC): ICD-10-CM

## 2020-11-09 DIAGNOSIS — N17.9 AKI (ACUTE KIDNEY INJURY) (HCC): Primary | ICD-10-CM

## 2020-11-09 PROCEDURE — G0463 HOSPITAL OUTPT CLINIC VISIT: HCPCS | Performed by: INTERNAL MEDICINE

## 2020-11-09 PROCEDURE — 99215 OFFICE O/P EST HI 40 MIN: CPT | Performed by: INTERNAL MEDICINE

## 2020-11-09 PROCEDURE — 1111F DSCHRG MED/CURRENT MED MERGE: CPT | Performed by: INTERNAL MEDICINE

## 2020-11-09 RX ORDER — SPIRONOLACTONE 25 MG/1
25 TABLET ORAL DAILY
Qty: 90 TABLET | Refills: 1 | Status: SHIPPED | OUTPATIENT
Start: 2020-11-09 | End: 2020-11-23

## 2020-11-09 RX ORDER — METOPROLOL SUCCINATE 25 MG/1
25 TABLET, EXTENDED RELEASE ORAL NIGHTLY
Qty: 60 TABLET | Refills: 1 | Status: SHIPPED | OUTPATIENT
Start: 2020-11-09 | End: 2020-12-09

## 2020-11-09 RX ORDER — MIDODRINE HYDROCHLORIDE 5 MG/1
5 TABLET ORAL 3 TIMES DAILY
Qty: 270 TABLET | Refills: 1 | Status: SHIPPED | OUTPATIENT
Start: 2020-11-09 | End: 2020-12-09

## 2020-11-09 RX ORDER — BUMETANIDE 1 MG/1
1 TABLET ORAL 2 TIMES DAILY
Qty: 180 TABLET | Refills: 1 | Status: SHIPPED | OUTPATIENT
Start: 2020-11-09 | End: 2021-01-21 | Stop reason: DRUGHIGH

## 2020-11-09 NOTE — PROGRESS NOTES
Progress Note     Patient is a 79 yrs old male with pmh of HTN, HL, BPH, autoimmune hepatitis/cirrhosis, ckd stage iii who presented for follow up after hospitalization     Patient was rehospitalized in October for sepsis / viral enteritis and AARON, A by mouth daily. 90 tablet 1   • bumetanide 1 MG Oral Tab Take 1 tablet (1 mg total) by mouth 2 (two) times daily. 180 tablet 1   • Metoprolol Succinate ER 25 MG Oral Tablet 24 Hr Take 1 tablet (25 mg total) by mouth nightly.  60 tablet 1   • Midodrine HCl 5 lungs are clear to auscultation bilaterally  Cardiovascular: regular rate and rhythm   Abdomen: soft, non-tender, non-distended, BS normal  Skin/Hair: no unusual rashes present, no abnormal bruising noted  Back/Spine: no abnormalities noted  Musculoskeleta (1 mg total) by mouth 2 (two) times daily. • Metoprolol Succinate ER 25 MG Oral Tablet 24 Hr 60 tablet 1     Sig: Take 1 tablet (25 mg total) by mouth nightly.    • Midodrine HCl 5 MG Oral Tab 270 tablet 1     Sig: Take 1 tablet (5 mg total) by mouth 3 (t

## 2020-11-09 NOTE — PATIENT INSTRUCTIONS
Follow up in 4 weeks   Lab test as ordered   Check orthostatic blood pressure   Reduce bumex to 1 mg daily alternating with 1 mg twice a day

## 2020-11-10 ENCOUNTER — HOSPITAL ENCOUNTER (EMERGENCY)
Facility: HOSPITAL | Age: 80
Discharge: HOME | End: 2020-11-10
Attending: EMERGENCY MEDICINE
Payer: MEDICARE

## 2020-11-10 VITALS
RESPIRATION RATE: 18 BRPM | SYSTOLIC BLOOD PRESSURE: 108 MMHG | DIASTOLIC BLOOD PRESSURE: 65 MMHG | OXYGEN SATURATION: 96 % | HEART RATE: 67 BPM | TEMPERATURE: 98 F

## 2020-11-10 DIAGNOSIS — N18.30 STAGE 3 CHRONIC KIDNEY DISEASE, UNSPECIFIED WHETHER STAGE 3A OR 3B CKD (HCC): ICD-10-CM

## 2020-11-10 DIAGNOSIS — E87.5 HYPERKALEMIA: Primary | ICD-10-CM

## 2020-11-10 PROCEDURE — 93005 ELECTROCARDIOGRAM TRACING: CPT

## 2020-11-10 PROCEDURE — 36415 COLL VENOUS BLD VENIPUNCTURE: CPT

## 2020-11-10 PROCEDURE — 93010 ELECTROCARDIOGRAM REPORT: CPT | Performed by: EMERGENCY MEDICINE

## 2020-11-10 PROCEDURE — 85025 COMPLETE CBC W/AUTO DIFF WBC: CPT | Performed by: EMERGENCY MEDICINE

## 2020-11-10 PROCEDURE — 80048 BASIC METABOLIC PNL TOTAL CA: CPT | Performed by: EMERGENCY MEDICINE

## 2020-11-10 PROCEDURE — 99283 EMERGENCY DEPT VISIT LOW MDM: CPT

## 2020-11-10 NOTE — ED PROVIDER NOTES
Patient Seen in: [facility] AND [facility] Emergency Department    History   Patient presents with:  Abnormal Result    Stated Complaint: abnormal labs    HPI    Patient is here with complaint of he was sent here because he had blood drawn yesterday and his pota MG Oral Tablet 24 Hr,  Take 1 tablet (25 mg total) by mouth nightly. Midodrine HCl 5 MG Oral Tab,  Take 1 tablet (5 mg total) by mouth 3 (three) times daily.    levETIRAcetam 500 MG Oral Tab,  Take 500 mg by mouth Q12H.   amiodarone HCl 200 MG Oral Tab, affect. Oriented. No unusual behavior. Interacting well.     Potassium was normal kidney function was about the same as previous I did contact and speak with this patient's nephrologist Dr. Gume Guerra, she did want the patient to change to once a day dosin Plan     We recommend that you schedule follow up care with a primary care provider within the next three months to obtain basic health screening including reassessment of your blood pressure.       Clinical Impression:  Hyperkalemia  (primary encounter negra

## 2020-11-12 ENCOUNTER — E-ADVICE (OUTPATIENT)
Dept: CARDIOLOGY | Age: 80
End: 2020-11-12

## 2020-11-13 ENCOUNTER — LAB ENCOUNTER (OUTPATIENT)
Dept: LAB | Facility: HOSPITAL | Age: 80
End: 2020-11-13
Attending: INTERNAL MEDICINE
Payer: MEDICARE

## 2020-11-13 ENCOUNTER — TELEPHONE (OUTPATIENT)
Dept: NEPHROLOGY | Facility: CLINIC | Age: 80
End: 2020-11-13

## 2020-11-13 DIAGNOSIS — R53.83 FATIGUE, UNSPECIFIED TYPE: ICD-10-CM

## 2020-11-13 DIAGNOSIS — I50.33 ACUTE ON CHRONIC DIASTOLIC HEART FAILURE (HCC): Primary | ICD-10-CM

## 2020-11-13 DIAGNOSIS — D69.6 THROMBOCYTOPENIA (HCC): ICD-10-CM

## 2020-11-13 LAB
ALBUMIN SERPL-MCNC: 3.4 G/DL
ALBUMIN/GLOB SERPL: 0.8 {RATIO}
ALP SERPL-CCNC: 95 U/L
ALT SERPL-CCNC: 79 UNITS/L
ANION GAP SERPL CALC-SCNC: 4 MMOL/L
AST SERPL-CCNC: 53 UNITS/L
BILIRUB SERPL-MCNC: 1.6 MG/DL
BUN SERPL-MCNC: 48 MG/DL
CALCIUM SERPL-MCNC: 9.6 MG/DL
CHLORIDE SERPL-SCNC: 98 MMOL/L
CO2 SERPL-SCNC: 33 MMOL/L
CREAT SERPL-MCNC: 2.26 MG/DL
GLOBULIN SER-MCNC: 4 G/DL
GLUCOSE SERPL-MCNC: 126 MG/DL
LENGTH OF FAST TIME PATIENT: NO H
POTASSIUM SERPL-SCNC: 4.3 MMOL/L
PROT SERPL-MCNC: 7.8 G/DL
SODIUM SERPL-SCNC: 135 MMOL/L

## 2020-11-13 PROCEDURE — 36415 COLL VENOUS BLD VENIPUNCTURE: CPT

## 2020-11-13 PROCEDURE — 85025 COMPLETE CBC W/AUTO DIFF WBC: CPT

## 2020-11-13 PROCEDURE — 80053 COMPREHEN METABOLIC PANEL: CPT

## 2020-11-13 RX ORDER — AMIODARONE HYDROCHLORIDE 200 MG/1
200 TABLET ORAL DAILY
Qty: 90 TABLET | Refills: 0 | Status: SHIPPED | OUTPATIENT
Start: 2020-11-13 | End: 2020-12-09

## 2020-11-16 ENCOUNTER — CLINICAL ABSTRACT (OUTPATIENT)
Dept: CARDIOLOGY | Age: 80
End: 2020-11-16

## 2020-11-20 ENCOUNTER — OFFICE VISIT (OUTPATIENT)
Dept: INTERNAL MEDICINE CLINIC | Facility: CLINIC | Age: 80
End: 2020-11-20
Payer: MEDICARE

## 2020-11-20 VITALS
OXYGEN SATURATION: 98 % | HEART RATE: 68 BPM | DIASTOLIC BLOOD PRESSURE: 64 MMHG | BODY MASS INDEX: 28.94 KG/M2 | HEIGHT: 73 IN | WEIGHT: 218.38 LBS | SYSTOLIC BLOOD PRESSURE: 100 MMHG

## 2020-11-20 DIAGNOSIS — N18.30 STAGE 3 CHRONIC KIDNEY DISEASE, UNSPECIFIED WHETHER STAGE 3A OR 3B CKD (HCC): ICD-10-CM

## 2020-11-20 DIAGNOSIS — N18.4 STAGE 4 CHRONIC KIDNEY DISEASE (HCC): ICD-10-CM

## 2020-11-20 DIAGNOSIS — D69.6 THROMBOCYTOPENIA (HCC): ICD-10-CM

## 2020-11-20 DIAGNOSIS — R56.9 SEIZURE (HCC): ICD-10-CM

## 2020-11-20 DIAGNOSIS — I10 ESSENTIAL HYPERTENSION: Primary | ICD-10-CM

## 2020-11-20 DIAGNOSIS — M15.9 PRIMARY OSTEOARTHRITIS INVOLVING MULTIPLE JOINTS: ICD-10-CM

## 2020-11-20 DIAGNOSIS — R53.83 FATIGUE, UNSPECIFIED TYPE: ICD-10-CM

## 2020-11-20 DIAGNOSIS — I48.19 PERSISTENT ATRIAL FIBRILLATION (HCC): ICD-10-CM

## 2020-11-20 DIAGNOSIS — I67.1 CEREBRAL ANEURYSM: ICD-10-CM

## 2020-11-20 DIAGNOSIS — E78.00 HYPERCHOLESTEROLEMIA: ICD-10-CM

## 2020-11-20 DIAGNOSIS — K75.4 AUTOIMMUNE HEPATITIS (HCC): ICD-10-CM

## 2020-11-20 DIAGNOSIS — R73.01 ABNORMAL FASTING GLUCOSE: ICD-10-CM

## 2020-11-20 PROCEDURE — 99214 OFFICE O/P EST MOD 30 MIN: CPT | Performed by: INTERNAL MEDICINE

## 2020-11-20 PROCEDURE — G0463 HOSPITAL OUTPT CLINIC VISIT: HCPCS | Performed by: INTERNAL MEDICINE

## 2020-11-20 NOTE — PATIENT INSTRUCTIONS
1.  Patient is to continue his current diet, medication and activity. 2.  Patient is to follow-up with his consultants as he is scheduled to do. 3.  I will plan to see the patient back in 1 month with blood tests.   I will place an order in the system for

## 2020-11-20 NOTE — PROGRESS NOTES
Any Fields is a 79year old male. Patient presents with:  Checkup: 2 week   Seizure Disorder  Aneurysm  Renal Insufficiency  Autoimmune Hepatitis    HPI:   Patient presents with:  Checkup: 2 week   Seizure Disorder  Aneurysm  Renal Insufficiency  Autoim every morning. • aspirin 81 MG Oral Tab Take 81 mg by mouth daily.         Past Medical History:   Diagnosis Date   • Borderline high blood pressure 2009   • Borderline high cholesterol    • BPH (benign prostatic hyperplasia) 2009   • Diverticulosis to consider a stent and possible coils in the future. Patient is also seeing Dr. Ruth Nicholas, hepatologist at [facility], regarding his autoimmune hepatitis. Patient will be seeing a neurologist at [facility] concerning his seizure disorder.   Patient will also follow-up Fatigue, unspecified type  Patient appears to be gradually improving. 10. Thrombocytopenia ([facility] [address]  Patient's recent CBC had a platelet count of 050,256. This number is just slightly low.   This is improved from where he was when he was hospitalized giancarlo

## 2020-11-23 ENCOUNTER — OFFICE VISIT (OUTPATIENT)
Dept: CARDIOLOGY CLINIC | Facility: HOSPITAL | Age: 80
End: 2020-11-23
Attending: NURSE PRACTITIONER
Payer: MEDICARE

## 2020-11-23 VITALS
WEIGHT: 220 LBS | SYSTOLIC BLOOD PRESSURE: 133 MMHG | DIASTOLIC BLOOD PRESSURE: 68 MMHG | OXYGEN SATURATION: 100 % | BODY MASS INDEX: 29 KG/M2 | HEART RATE: 54 BPM

## 2020-11-23 DIAGNOSIS — I50.9 HEART FAILURE, UNSPECIFIED (HCC): ICD-10-CM

## 2020-11-23 DIAGNOSIS — I10 ESSENTIAL HYPERTENSION: ICD-10-CM

## 2020-11-23 DIAGNOSIS — K75.4 AUTOIMMUNE HEPATITIS (HCC): Chronic | ICD-10-CM

## 2020-11-23 DIAGNOSIS — I50.33 ACUTE ON CHRONIC HEART FAILURE WITH PRESERVED EJECTION FRACTION (HFPEF) (HCC): Primary | ICD-10-CM

## 2020-11-23 DIAGNOSIS — N18.4 STAGE 4 CHRONIC KIDNEY DISEASE (HCC): ICD-10-CM

## 2020-11-23 LAB — NT-PROBNP SERPL-MCNC: 248 PG/ML

## 2020-11-23 PROCEDURE — 83880 ASSAY OF NATRIURETIC PEPTIDE: CPT | Performed by: NURSE PRACTITIONER

## 2020-11-23 PROCEDURE — 99214 OFFICE O/P EST MOD 30 MIN: CPT | Performed by: NURSE PRACTITIONER

## 2020-11-23 PROCEDURE — 80048 BASIC METABOLIC PNL TOTAL CA: CPT | Performed by: NURSE PRACTITIONER

## 2020-11-23 PROCEDURE — 99212 OFFICE O/P EST SF 10 MIN: CPT | Performed by: NURSE PRACTITIONER

## 2020-11-23 PROCEDURE — 36415 COLL VENOUS BLD VENIPUNCTURE: CPT | Performed by: NURSE PRACTITIONER

## 2020-11-23 RX ORDER — SPIRONOLACTONE 25 MG/1
12.5 TABLET ORAL DAILY
Qty: 90 TABLET | Refills: 1 | COMMUNITY
Start: 2020-11-23 | End: 2021-04-15 | Stop reason: ALTCHOICE

## 2020-11-23 NOTE — PATIENT INSTRUCTIONS
Decrease spironolactone to 12.5 mg daily ( half of 25 mg tab)     Continue all your same medications    Call if having any dizziness, lightheadedness, heart racing, palpitations, chest pain, shortness of breath, coughing,  wheezing, swelling, weight gain,

## 2020-11-23 NOTE — PROGRESS NOTES
[address] Patient Status:  No patient class for patient encounter    1940 MRN [id number]   Location [facility] MD Dr. Mickey Reeves Dr. negative for cough, hemoptysis and wheezing  Cardiovascular: negative for chest pain, exertional chest pressure/discomfort, near-syncope, orthopnea and palpitations  Gastrointestinal: negative for abdominal pain, diarrhea, melena, nausea and vomiting  Hema masses,  mild abdominal distension  Extremities: extremities normal, no cyanosis + 1 RLE edema, mid tibial to pedal, +1-2 LLE mid tibial to ankle edema   Pulses: 2+ and symmetric  Neurologic: Grossly normal    Diagnostics:  ECG: 10/22/20 atrial fib 78 bpm, Spironolactone to 12.5 mg daily   -continue same medications including  bumex 1 mg daily and toprol  -continue  64 oz/day fluid restriction, 2 gm sodium hearth healthy diet  -follow up with Dr. Jodi Thomas on 12/9 with repeat cmp as scheduled right before visi 180 tablet, Rfl: 1  •  Metoprolol Succinate ER 25 MG Oral Tablet 24 Hr, Take 1 tablet (25 mg total) by mouth nightly., Disp: 60 tablet, Rfl: 1  •  Midodrine HCl 5 MG Oral Tab, Take 1 tablet (5 mg total) by mouth 3 (three) times daily. , Disp: 270 tablet, Rf

## 2020-11-30 ENCOUNTER — TELEPHONE (OUTPATIENT)
Dept: NEPHROLOGY | Facility: CLINIC | Age: 80
End: 2020-11-30

## 2020-11-30 DIAGNOSIS — N17.9 AKI (ACUTE KIDNEY INJURY) (HCC): Primary | ICD-10-CM

## 2020-11-30 NOTE — TELEPHONE ENCOUNTER
Reduce bumex to 0.5 mg daily dose - monitor weights and BP at home and bring at next office visit     Lab test next week prior to his next visit - ordered

## 2020-12-01 ENCOUNTER — TELEPHONE (OUTPATIENT)
Dept: INTERNAL MEDICINE CLINIC | Facility: CLINIC | Age: 80
End: 2020-12-01

## 2020-12-01 ENCOUNTER — PATIENT MESSAGE (OUTPATIENT)
Dept: INTERNAL MEDICINE CLINIC | Facility: CLINIC | Age: 80
End: 2020-12-01

## 2020-12-01 DIAGNOSIS — Z12.5 PROSTATE CANCER SCREENING: Primary | ICD-10-CM

## 2020-12-01 NOTE — TELEPHONE ENCOUNTER
Shorty Bourgeois  to Loly Marie MD          12/1/20 11:24 AM  At an earlier visit we talked about scheduling a PSA test for me even though I will not be doing my annual physical.  It was last done on 11/1/19 so I am eligible to have it done.

## 2020-12-01 NOTE — TELEPHONE ENCOUNTER
From: Renny Encarnacion  To:  Srikanth Camilo MD  Sent: 12/1/2020 11:24 AM CST  Subject: Non-Urgent Medical Question    At an earlier visit we talked about scheduling a PSA test for me even though I will not be doing my annual physical. It was last do

## 2020-12-08 ENCOUNTER — LAB ENCOUNTER (OUTPATIENT)
Dept: LAB | Facility: REFERENCE LAB | Age: 80
End: 2020-12-08
Attending: INTERNAL MEDICINE
Payer: MEDICARE

## 2020-12-08 ENCOUNTER — TELEPHONE (OUTPATIENT)
Dept: INTERNAL MEDICINE CLINIC | Facility: CLINIC | Age: 80
End: 2020-12-08

## 2020-12-08 DIAGNOSIS — E78.00 HYPERCHOLESTEROLEMIA: ICD-10-CM

## 2020-12-08 DIAGNOSIS — R73.01 ABNORMAL FASTING GLUCOSE: ICD-10-CM

## 2020-12-08 DIAGNOSIS — Z12.5 PROSTATE CANCER SCREENING: ICD-10-CM

## 2020-12-08 DIAGNOSIS — R53.83 FATIGUE, UNSPECIFIED TYPE: ICD-10-CM

## 2020-12-08 DIAGNOSIS — D69.6 THROMBOCYTOPENIA (HCC): ICD-10-CM

## 2020-12-08 DIAGNOSIS — K75.4 AUTOIMMUNE HEPATITIS (HCC): ICD-10-CM

## 2020-12-08 LAB
ABSOLUTE IMMATURE GRANULOCYTES (OFFPRE24): 0.07
ALBUMIN SERPL-MCNC: 3.2 G/DL
ALBUMIN/GLOB SERPL: 0.9 {RATIO}
ALP SERPL-CCNC: 69 U/L
ALT SERPL-CCNC: 54 UNITS/L
ANION GAP SERPL CALC-SCNC: 6 MMOL/L
AST SERPL-CCNC: 38 UNITS/L
BASOPHILS # BLD: 0.05 10*3/UL
BASOPHILS NFR BLD: 0.5 %
BILIRUB SERPL-MCNC: 1.4 MG/DL
BUN SERPL-MCNC: 32 MG/DL
BUN/CREAT SERPL: 17.4
CALCIUM SERPL-MCNC: 9.5 MG/DL
CHLORIDE SERPL-SCNC: 104 MMOL/L
CHOLEST SERPL-MCNC: 234 MG/DL
CO2 SERPL-SCNC: 28 MMOL/L
CREAT SERPL-MCNC: 1.84 MG/DL
EOSINOPHIL # BLD: 0.24 10*3/UL
EOSINOPHIL NFR BLD: 2.4 %
GLOBULIN SER-MCNC: 3.6 G/DL
GLUCOSE SERPL-MCNC: 93 MG/DL
HCT VFR BLD CALC: 40.9 %
HDLC SERPL-MCNC: 65 MG/DL
HGB BLD-MCNC: 14.3 G/DL
IMMATURE GRANULOCYTES (OFFPRE25): 0.7
LDLC SERPL CALC-MCNC: 150 MG/DL
LENGTH OF FAST TIME PATIENT: YES H
LENGTH OF FAST TIME PATIENT: YES H
LYMPHOCYTES # BLD: 2.44 10*3/UL
LYMPHOCYTES NFR BLD: 24.6 %
MCH RBC QN AUTO: 34 PG
MCHC RBC AUTO-ENTMCNC: 35 G/DL
MCV RBC AUTO: 97.1 FL
MONOCYTES # BLD: 0.83 10*3/UL
MONOCYTES NFR BLD: 8.4 %
NEUTROPHILS # BLD: 6.27 10*3/UL
NEUTROPHILS NFR BLD: 63.4 %
NONHDLC SERPL-MCNC: 169 MG/DL
PLATELET # BLD: 112 K/MCL
POTASSIUM SERPL-SCNC: 4.9 MMOL/L
PROT SERPL-MCNC: 6.8 G/DL
RBC # BLD: 4.21 10*6/UL
SODIUM SERPL-SCNC: 138 MMOL/L
TRIGL SERPL-MCNC: 97 MG/DL
VLDLC SERPL CALC-MCNC: 19 MG/DL
WBC # BLD: 9.9 K/MCL

## 2020-12-08 PROCEDURE — 80061 LIPID PANEL: CPT

## 2020-12-08 PROCEDURE — 85025 COMPLETE CBC W/AUTO DIFF WBC: CPT

## 2020-12-08 PROCEDURE — 86769 SARS-COV-2 COVID-19 ANTIBODY: CPT

## 2020-12-08 PROCEDURE — 80053 COMPREHEN METABOLIC PANEL: CPT

## 2020-12-08 PROCEDURE — 36415 COLL VENOUS BLD VENIPUNCTURE: CPT

## 2020-12-09 ENCOUNTER — OFFICE VISIT (OUTPATIENT)
Dept: CARDIOLOGY | Age: 80
End: 2020-12-09

## 2020-12-09 VITALS
OXYGEN SATURATION: 96 % | DIASTOLIC BLOOD PRESSURE: 60 MMHG | HEIGHT: 73 IN | WEIGHT: 224 LBS | BODY MASS INDEX: 29.69 KG/M2 | SYSTOLIC BLOOD PRESSURE: 128 MMHG | HEART RATE: 65 BPM

## 2020-12-09 DIAGNOSIS — I48.19 PERSISTENT ATRIAL FIBRILLATION (CMD): Primary | ICD-10-CM

## 2020-12-09 DIAGNOSIS — E78.9 BORDERLINE HIGH CHOLESTEROL: ICD-10-CM

## 2020-12-09 DIAGNOSIS — I10 ESSENTIAL HYPERTENSION: ICD-10-CM

## 2020-12-09 DIAGNOSIS — I50.33 ACUTE ON CHRONIC HEART FAILURE WITH PRESERVED EJECTION FRACTION (CMD): ICD-10-CM

## 2020-12-09 PROCEDURE — 99214 OFFICE O/P EST MOD 30 MIN: CPT | Performed by: INTERNAL MEDICINE

## 2020-12-09 RX ORDER — METOPROLOL SUCCINATE 25 MG/1
25 TABLET, EXTENDED RELEASE ORAL DAILY
COMMUNITY

## 2020-12-09 RX ORDER — AMIODARONE HYDROCHLORIDE 200 MG/1
200 TABLET ORAL DAILY
COMMUNITY
End: 2021-05-17 | Stop reason: ALTCHOICE

## 2020-12-09 RX ORDER — MIDODRINE HYDROCHLORIDE 5 MG/1
5 TABLET ORAL 3 TIMES DAILY
COMMUNITY
End: 2021-01-18 | Stop reason: SDUPTHER

## 2020-12-09 ASSESSMENT — PATIENT HEALTH QUESTIONNAIRE - PHQ9
CLINICAL INTERPRETATION OF PHQ2 SCORE: NO FURTHER SCREENING NEEDED
SUM OF ALL RESPONSES TO PHQ9 QUESTIONS 1 AND 2: 0
SUM OF ALL RESPONSES TO PHQ9 QUESTIONS 1 AND 2: 0
1. LITTLE INTEREST OR PLEASURE IN DOING THINGS: NOT AT ALL
2. FEELING DOWN, DEPRESSED OR HOPELESS: NOT AT ALL
CLINICAL INTERPRETATION OF PHQ9 SCORE: NO FURTHER SCREENING NEEDED

## 2020-12-09 NOTE — TELEPHONE ENCOUNTER
Please call pt and notify him that his COVID antibody test has come back as not detected which is negative. I will discuss the rest of his test results with him at his next visit. I will route this to nursing.    Thank you!!

## 2020-12-09 NOTE — TELEPHONE ENCOUNTER
Pt notified covid antibody test - neg / DR GUSTAFSON   Will discuss rest of test results at next visit

## 2020-12-11 ENCOUNTER — OFFICE VISIT (OUTPATIENT)
Dept: NEPHROLOGY | Facility: CLINIC | Age: 80
End: 2020-12-11
Payer: MEDICARE

## 2020-12-11 VITALS
DIASTOLIC BLOOD PRESSURE: 62 MMHG | SYSTOLIC BLOOD PRESSURE: 106 MMHG | BODY MASS INDEX: 29.69 KG/M2 | HEART RATE: 66 BPM | WEIGHT: 224 LBS | HEIGHT: 73 IN

## 2020-12-11 DIAGNOSIS — K75.4 AUTOIMMUNE HEPATITIS (HCC): ICD-10-CM

## 2020-12-11 DIAGNOSIS — N18.30 STAGE 3 CHRONIC KIDNEY DISEASE, UNSPECIFIED WHETHER STAGE 3A OR 3B CKD (HCC): ICD-10-CM

## 2020-12-11 DIAGNOSIS — N17.9 AKI (ACUTE KIDNEY INJURY) (HCC): Primary | ICD-10-CM

## 2020-12-11 PROCEDURE — G0463 HOSPITAL OUTPT CLINIC VISIT: HCPCS | Performed by: INTERNAL MEDICINE

## 2020-12-11 PROCEDURE — 99214 OFFICE O/P EST MOD 30 MIN: CPT | Performed by: INTERNAL MEDICINE

## 2020-12-11 RX ORDER — MIDODRINE HYDROCHLORIDE 5 MG/1
5 TABLET ORAL 3 TIMES DAILY
COMMUNITY
Start: 2020-10-18 | End: 2021-04-15 | Stop reason: ALTCHOICE

## 2020-12-11 NOTE — PATIENT INSTRUCTIONS
Stop spironolactone   Repeat lab test in 2 weeks  Follow up in 4 weeks   Keep monitoring blood pressure and weight at home   Take vitamin D3 at 2000 units

## 2020-12-11 NOTE — PROGRESS NOTES
Progress Note     Patient is a 79 yrs old male with pmh of HTN, HL, BPH, autoimmune hepatitis/cirrhosis, ckd stage iii who presented for follow up     Patient was rehospitalized in October for sepsis / viral enteritis and AARON, A-fib with RVR.  Had [address] [facility] (Active prior to today's visit):  Current Outpatient Medications   Medication Sig Dispense Refill   • Midodrine HCl 5 MG Oral Tab Take 5 mg by mouth 3 (three) times daily. • spironolactone 25 MG Oral Tab Take 0.5 tablets (12.5 mg total) by mouth daily. cervical, supraclavicular adenopathy is noted  Respiratory:  lungs are clear to auscultation bilaterally  Cardiovascular: regular rate and rhythm   Abdomen: soft, non-tender, non-distended, BS normal  Skin/Hair: no unusual rashes present, no abnormal bruis Urinalysis, Routine      Protein,Total,Urine, Random [E]      Meds This Visit:  Requested Prescriptions      No prescriptions requested or ordered in this encounter       Imaging & Referrals:  None     12/11/2020  Jay Jay Ruelas MD

## 2020-12-21 ENCOUNTER — LAB ENCOUNTER (OUTPATIENT)
Dept: LAB | Age: 80
End: 2020-12-21
Attending: INTERNAL MEDICINE
Payer: MEDICARE

## 2020-12-21 ENCOUNTER — OFFICE VISIT (OUTPATIENT)
Dept: INTERNAL MEDICINE CLINIC | Facility: CLINIC | Age: 80
End: 2020-12-21
Payer: MEDICARE

## 2020-12-21 VITALS
HEART RATE: 60 BPM | DIASTOLIC BLOOD PRESSURE: 70 MMHG | OXYGEN SATURATION: 98 % | TEMPERATURE: 98 F | BODY MASS INDEX: 30.09 KG/M2 | HEIGHT: 73 IN | WEIGHT: 227 LBS | SYSTOLIC BLOOD PRESSURE: 126 MMHG

## 2020-12-21 DIAGNOSIS — N18.30 STAGE 3 CHRONIC KIDNEY DISEASE, UNSPECIFIED WHETHER STAGE 3A OR 3B CKD (HCC): ICD-10-CM

## 2020-12-21 DIAGNOSIS — R53.83 FATIGUE, UNSPECIFIED TYPE: ICD-10-CM

## 2020-12-21 DIAGNOSIS — E78.00 HYPERCHOLESTEROLEMIA: ICD-10-CM

## 2020-12-21 DIAGNOSIS — R56.9 SEIZURE (HCC): ICD-10-CM

## 2020-12-21 DIAGNOSIS — D69.6 THROMBOCYTOPENIA (HCC): ICD-10-CM

## 2020-12-21 DIAGNOSIS — I67.1 CEREBRAL ANEURYSM: ICD-10-CM

## 2020-12-21 DIAGNOSIS — K75.4 AUTOIMMUNE HEPATITIS (HCC): ICD-10-CM

## 2020-12-21 DIAGNOSIS — I48.19 PERSISTENT ATRIAL FIBRILLATION (HCC): ICD-10-CM

## 2020-12-21 DIAGNOSIS — I10 ESSENTIAL HYPERTENSION: Primary | ICD-10-CM

## 2020-12-21 DIAGNOSIS — M15.9 PRIMARY OSTEOARTHRITIS INVOLVING MULTIPLE JOINTS: ICD-10-CM

## 2020-12-21 DIAGNOSIS — R73.01 ABNORMAL FASTING GLUCOSE: ICD-10-CM

## 2020-12-21 PROCEDURE — 36415 COLL VENOUS BLD VENIPUNCTURE: CPT

## 2020-12-21 PROCEDURE — G0463 HOSPITAL OUTPT CLINIC VISIT: HCPCS | Performed by: INTERNAL MEDICINE

## 2020-12-21 PROCEDURE — 80048 BASIC METABOLIC PNL TOTAL CA: CPT

## 2020-12-21 PROCEDURE — 99214 OFFICE O/P EST MOD 30 MIN: CPT | Performed by: INTERNAL MEDICINE

## 2020-12-21 NOTE — PATIENT INSTRUCTIONS
1.  Patient is to continue his current diet, medication and activity. 2.  I will obtain a BMP today. 3.  Patient is to continue his current medications at this time. 4.  I will await the results of today's BMP.   5.  I will tentatively plan to see the pa

## 2020-12-21 NOTE — PROGRESS NOTES
Ryan Rey is a 79year old male. Patient presents with:  Checkup: 4 week, Pt stopped Spirolactone per Dr Antoinette Muir  Seizure Disorder  Aneurysm  Renal Insufficiency  Autoimmune Hepatitis    HPI:   Patient presents with:  Checkup: 4 week, Pt stopped Particles Take 3 mg by mouth every morning. • aspirin 81 MG Oral Tab Take 81 mg by mouth daily. • spironolactone 25 MG Oral Tab Take 0.5 tablets (12.5 mg total) by mouth daily.  90 tablet 1      Past Medical History:   Diagnosis Date   • Marley 250pounds to 205 pounds with his recent illness. They are concerned because the patient stopped his spironolactone that that could be responsible for his weight gain. Currently his blood pressure is doing well. He does not appear to be fluid overloaded. count of 1 and 12,000. His hemoglobin is 14.3 hematocrit is 46.9.  CPM.    11. Abnormal fasting glucose  Doing well.  CPM.  Patient's recent FBS was 93. The patient indicates understanding of these issues and agrees to the plan.   The patient is asked

## 2020-12-22 ENCOUNTER — TELEPHONE (OUTPATIENT)
Dept: INTERNAL MEDICINE CLINIC | Facility: CLINIC | Age: 80
End: 2020-12-22

## 2020-12-22 NOTE — TELEPHONE ENCOUNTER
Telephone call to pt and message left that his recent BMP has turned out well and is improved from his previous blood test.  Renal function has improved slightly. Pt/wife to call back as necessary.

## 2020-12-29 ENCOUNTER — LAB ENCOUNTER (OUTPATIENT)
Dept: LAB | Age: 80
End: 2020-12-29
Attending: INTERNAL MEDICINE
Payer: MEDICARE

## 2020-12-29 DIAGNOSIS — N17.9 AKI (ACUTE KIDNEY INJURY) (HCC): ICD-10-CM

## 2020-12-29 DIAGNOSIS — N18.30 STAGE 3 CHRONIC KIDNEY DISEASE, UNSPECIFIED WHETHER STAGE 3A OR 3B CKD (HCC): ICD-10-CM

## 2020-12-29 LAB
ANION GAP SERPL CALC-SCNC: 4 MMOL/L
ANION GAP SERPL CALC-SCNC: 4 MMOL/L (ref 0–18)
BILIRUB UR QL: NEGATIVE
BUN BLD-MCNC: 24 MG/DL (ref 7–18)
BUN SERPL-MCNC: 24 MG/DL
BUN/CREAT SERPL: 14.5
BUN/CREAT SERPL: 14.5 (ref 10–20)
CALCIUM BLD-MCNC: 9.5 MG/DL (ref 8.5–10.1)
CALCIUM SERPL-MCNC: 9.5 MG/DL
CHLORIDE SERPL-SCNC: 103 MMOL/L
CHLORIDE SERPL-SCNC: 103 MMOL/L (ref 98–112)
CLARITY UR: CLEAR
CO2 SERPL-SCNC: 32 MMOL/L
CO2 SERPL-SCNC: 32 MMOL/L (ref 21–32)
COLOR UR: YELLOW
CREAT BLD-MCNC: 1.65 MG/DL
CREAT SERPL-MCNC: 1.65 MG/DL
CREAT UR-SCNC: 76.7 MG/DL
GLUCOSE BLD-MCNC: 145 MG/DL (ref 70–99)
GLUCOSE SERPL-MCNC: 145 MG/DL
GLUCOSE UR-MCNC: NEGATIVE MG/DL
HGB UR QL STRIP.AUTO: NEGATIVE
KETONES UR-MCNC: NEGATIVE MG/DL
LEUKOCYTE ESTERASE UR QL STRIP.AUTO: NEGATIVE
MICROALBUMIN UR-MCNC: 0.67 MG/DL
MICROALBUMIN/CREAT 24H UR-RTO: 8.7 UG/MG (ref ?–30)
NITRITE UR QL STRIP.AUTO: NEGATIVE
OSMOLALITY SERPL CALC.SUM OF ELEC: 295 MOSM/KG (ref 275–295)
PATIENT FASTING Y/N/NP: NO
PH UR: 6 [PH] (ref 5–8)
POTASSIUM SERPL-SCNC: 4.6 MMOL/L
POTASSIUM SERPL-SCNC: 4.6 MMOL/L (ref 3.5–5.1)
PROT UR-MCNC: 7.7 MG/DL
PROT UR-MCNC: NEGATIVE MG/DL
SODIUM SERPL-SCNC: 139 MMOL/L
SODIUM SERPL-SCNC: 139 MMOL/L (ref 136–145)
SP GR UR STRIP: 1.01 (ref 1–1.03)
UROBILINOGEN UR STRIP-ACNC: <2

## 2020-12-29 PROCEDURE — 81003 URINALYSIS AUTO W/O SCOPE: CPT

## 2020-12-29 PROCEDURE — 36415 COLL VENOUS BLD VENIPUNCTURE: CPT

## 2020-12-29 PROCEDURE — 84156 ASSAY OF PROTEIN URINE: CPT

## 2020-12-29 PROCEDURE — 82570 ASSAY OF URINE CREATININE: CPT

## 2020-12-29 PROCEDURE — 80048 BASIC METABOLIC PNL TOTAL CA: CPT

## 2020-12-29 PROCEDURE — 82043 UR ALBUMIN QUANTITATIVE: CPT

## 2021-01-11 ENCOUNTER — OFFICE VISIT (OUTPATIENT)
Dept: NEPHROLOGY | Facility: CLINIC | Age: 81
End: 2021-01-11
Payer: MEDICARE

## 2021-01-11 VITALS
TEMPERATURE: 98 F | BODY MASS INDEX: 29.74 KG/M2 | WEIGHT: 224.38 LBS | HEART RATE: 60 BPM | HEIGHT: 73 IN | DIASTOLIC BLOOD PRESSURE: 61 MMHG | SYSTOLIC BLOOD PRESSURE: 130 MMHG

## 2021-01-11 DIAGNOSIS — N17.9 AKI (ACUTE KIDNEY INJURY) (HCC): ICD-10-CM

## 2021-01-11 DIAGNOSIS — N18.30 STAGE 3 CHRONIC KIDNEY DISEASE, UNSPECIFIED WHETHER STAGE 3A OR 3B CKD (HCC): Primary | ICD-10-CM

## 2021-01-11 PROCEDURE — 99215 OFFICE O/P EST HI 40 MIN: CPT | Performed by: INTERNAL MEDICINE

## 2021-01-11 RX ORDER — METOPROLOL SUCCINATE 25 MG/1
25 TABLET, EXTENDED RELEASE ORAL NIGHTLY
COMMUNITY
Start: 2020-10-18 | End: 2021-01-21

## 2021-01-11 NOTE — PROGRESS NOTES
Progress Note     Patient is a 79 yrs old male with pmh of HTN, HL, BPH, autoimmune hepatitis/cirrhosis, ckd stage iii who presented for follow up     Patient was rehospitalized in October for sepsis / viral enteritis and AARON, A-fib with RVR.  Had [address] [facility] Dialate Urethra   • FOOT SURGERY  04/17/2014    Right Ankle Surgery           Medications (Active prior to today's visit):  Current Outpatient Medications   Medication Sig Dispense Refill   • Midodrine HCl 5 MG Oral Tab Take 5 mg by mouth 3 (three) times d intact  Nose/Mouth/Throat:mucous membranes are moist   Neck/Thyroid: neck is supple without adenopathy  Lymphatic: no abnormal cervical, supraclavicular adenopathy is noted  Respiratory:  lungs are clear to auscultation bilaterally  Cardiovascular: regular This Visit:  Requested Prescriptions      No prescriptions requested or ordered in this encounter       Imaging & Referrals:  None     1/11/2020  Hung Winston MD

## 2021-01-18 ENCOUNTER — ANCILLARY PROCEDURE (OUTPATIENT)
Dept: CARDIOLOGY | Age: 81
End: 2021-01-18
Attending: INTERNAL MEDICINE

## 2021-01-18 VITALS — WEIGHT: 224 LBS | HEIGHT: 73 IN | BODY MASS INDEX: 29.69 KG/M2

## 2021-01-18 DIAGNOSIS — E78.9 BORDERLINE HIGH CHOLESTEROL: ICD-10-CM

## 2021-01-18 DIAGNOSIS — I48.19 PERSISTENT ATRIAL FIBRILLATION (CMD): ICD-10-CM

## 2021-01-18 DIAGNOSIS — I50.33 ACUTE ON CHRONIC HEART FAILURE WITH PRESERVED EJECTION FRACTION (CMD): ICD-10-CM

## 2021-01-18 DIAGNOSIS — I10 ESSENTIAL HYPERTENSION: ICD-10-CM

## 2021-01-18 LAB
LV EF: 59 %
STRESS TARGET HR: 141 BPM

## 2021-01-18 PROCEDURE — A9502 TC99M TETROFOSMIN: HCPCS | Performed by: INTERNAL MEDICINE

## 2021-01-18 PROCEDURE — 93015 CV STRESS TEST SUPVJ I&R: CPT | Performed by: INTERNAL MEDICINE

## 2021-01-18 PROCEDURE — 78452 HT MUSCLE IMAGE SPECT MULT: CPT | Performed by: INTERNAL MEDICINE

## 2021-01-18 RX ORDER — REGADENOSON 0.08 MG/ML
0.4 INJECTION, SOLUTION INTRAVENOUS ONCE
Status: COMPLETED | OUTPATIENT
Start: 2021-01-18 | End: 2021-01-18

## 2021-01-18 RX ADMIN — REGADENOSON 0.4 MG: 0.08 INJECTION, SOLUTION INTRAVENOUS at 11:15

## 2021-01-18 ASSESSMENT — EXERCISE STRESS TEST
STAGE_CATEGORIES: RESTING
PEAK_RPP: 8960
PEAK_RPP: 8400
STAGE_CATEGORIES: RECOVERY 1
PEAK_BP: 130/72
STAGE_CATEGORIES: RECOVERY 2
PEAK_BP: 140/70
PEAK_RPP: 7410
PEAK_HR: 66
STOPPAGE_REASON: PROTOCOL COMPLETE
STAGE_CATEGORIES: 1
COMMENTS: 4 MINUTE RECOVERY
PEAK_HR: 59
COMMENTS: 2 MINUTE RECOVERY
COMMENTS: 20 SECOND RECOVERY
PEAK_HR: 60
PEAK_BP: 122/70
PEAK_HR: 57
PEAK_RPP: 8052
PEAK_HR: 70
STAGE_CATEGORIES: RECOVERY 0
PEAK_BP: 128/82

## 2021-01-18 NOTE — TELEPHONE ENCOUNTER
Last seen 1/11/21.  Return to clinic in 3 months (4/21) Medication is not noted and not in medication list.

## 2021-01-19 ENCOUNTER — E-ADVICE (OUTPATIENT)
Dept: CARDIOLOGY | Age: 81
End: 2021-01-19

## 2021-01-19 ENCOUNTER — TELEPHONE (OUTPATIENT)
Dept: CARDIOLOGY | Age: 81
End: 2021-01-19

## 2021-01-21 ENCOUNTER — OFFICE VISIT (OUTPATIENT)
Dept: INTERNAL MEDICINE CLINIC | Facility: CLINIC | Age: 81
End: 2021-01-21
Payer: MEDICARE

## 2021-01-21 VITALS
DIASTOLIC BLOOD PRESSURE: 70 MMHG | TEMPERATURE: 99 F | SYSTOLIC BLOOD PRESSURE: 130 MMHG | WEIGHT: 223.19 LBS | OXYGEN SATURATION: 98 % | BODY MASS INDEX: 29.58 KG/M2 | HEIGHT: 73 IN | HEART RATE: 60 BPM

## 2021-01-21 DIAGNOSIS — N18.30 STAGE 3 CHRONIC KIDNEY DISEASE, UNSPECIFIED WHETHER STAGE 3A OR 3B CKD (HCC): ICD-10-CM

## 2021-01-21 DIAGNOSIS — D69.6 THROMBOCYTOPENIA (HCC): ICD-10-CM

## 2021-01-21 DIAGNOSIS — R56.9 SEIZURE (HCC): ICD-10-CM

## 2021-01-21 DIAGNOSIS — I10 ESSENTIAL HYPERTENSION: Primary | ICD-10-CM

## 2021-01-21 DIAGNOSIS — I48.19 PERSISTENT ATRIAL FIBRILLATION (HCC): ICD-10-CM

## 2021-01-21 DIAGNOSIS — I67.1 CEREBRAL ANEURYSM: ICD-10-CM

## 2021-01-21 DIAGNOSIS — E78.00 HYPERCHOLESTEROLEMIA: ICD-10-CM

## 2021-01-21 DIAGNOSIS — M15.9 PRIMARY OSTEOARTHRITIS INVOLVING MULTIPLE JOINTS: ICD-10-CM

## 2021-01-21 DIAGNOSIS — R53.83 FATIGUE, UNSPECIFIED TYPE: ICD-10-CM

## 2021-01-21 DIAGNOSIS — R73.01 ABNORMAL FASTING GLUCOSE: ICD-10-CM

## 2021-01-21 DIAGNOSIS — K75.4 AUTOIMMUNE HEPATITIS (HCC): ICD-10-CM

## 2021-01-21 PROCEDURE — 99214 OFFICE O/P EST MOD 30 MIN: CPT | Performed by: INTERNAL MEDICINE

## 2021-01-21 RX ORDER — METOPROLOL SUCCINATE 25 MG/1
TABLET, EXTENDED RELEASE ORAL
Qty: 90 TABLET | Refills: 0 | Status: SHIPPED | OUTPATIENT
Start: 2021-01-21 | End: 2021-03-12

## 2021-01-21 RX ORDER — BUMETANIDE 0.5 MG/1
0.5 TABLET ORAL DAILY
COMMUNITY
End: 2021-04-15 | Stop reason: ALTCHOICE

## 2021-01-21 NOTE — PATIENT INSTRUCTIONS
1.  Patient is to continue his current diet, medication and activity. 2.  Patient is going to have blood tests performed in early February that were ordered by Dr. Reinaldo Johnson and also patient's hepatologist from [facility].   I will add a CBC, lipid panel, AST and

## 2021-01-21 NOTE — PROGRESS NOTES
Mykel Marrero is a 79year old male. Patient presents with:  Checkup: 1 month  Seizure Disorder  Aneurysm  Renal Insufficiency  Autoimmune Hepatitis    HPI:   Patient presents with:  Checkup: 1 month  Seizure Disorder  Aneurysm  Renal Insufficiency cholesterol    • BPH (benign prostatic hyperplasia) 2009   • Diverticulosis    • Elevated liver enzymes    • Enlarged prostate 2009   • Essential hypertension    • Left axis deviation 2002   • Prostate enlargement 03/06/2014    Surgery to reduce his prosta with his neurosurgeon at [facility] about arranging to have repair of his cerebral aneurysm. I will see the patient back in 2 months.   I have given the patient an order to have a CBC, lipid panel, AST and ALT to be done in addition to the BMP that Dr. Shasha Estrada

## 2021-01-25 ENCOUNTER — OFFICE VISIT (OUTPATIENT)
Dept: CARDIOLOGY | Age: 81
End: 2021-01-25

## 2021-01-25 VITALS
SYSTOLIC BLOOD PRESSURE: 104 MMHG | WEIGHT: 225 LBS | HEIGHT: 73 IN | OXYGEN SATURATION: 98 % | BODY MASS INDEX: 29.82 KG/M2 | HEART RATE: 62 BPM | DIASTOLIC BLOOD PRESSURE: 60 MMHG

## 2021-01-25 DIAGNOSIS — E78.9 BORDERLINE HIGH CHOLESTEROL: ICD-10-CM

## 2021-01-25 DIAGNOSIS — I48.19 PERSISTENT ATRIAL FIBRILLATION (CMD): Primary | ICD-10-CM

## 2021-01-25 DIAGNOSIS — I10 ESSENTIAL HYPERTENSION: ICD-10-CM

## 2021-01-25 PROCEDURE — 99214 OFFICE O/P EST MOD 30 MIN: CPT | Performed by: INTERNAL MEDICINE

## 2021-01-25 ASSESSMENT — PATIENT HEALTH QUESTIONNAIRE - PHQ9
SUM OF ALL RESPONSES TO PHQ9 QUESTIONS 1 AND 2: 0
CLINICAL INTERPRETATION OF PHQ2 SCORE: NO FURTHER SCREENING NEEDED
SUM OF ALL RESPONSES TO PHQ9 QUESTIONS 1 AND 2: 0
2. FEELING DOWN, DEPRESSED OR HOPELESS: NOT AT ALL
CLINICAL INTERPRETATION OF PHQ9 SCORE: NO FURTHER SCREENING NEEDED
1. LITTLE INTEREST OR PLEASURE IN DOING THINGS: NOT AT ALL

## 2021-01-27 DIAGNOSIS — Z23 NEED FOR VACCINATION: ICD-10-CM

## 2021-01-31 ENCOUNTER — IMMUNIZATION (OUTPATIENT)
Dept: LAB | Age: 81
End: 2021-01-31
Attending: HOSPITALIST
Payer: MEDICARE

## 2021-01-31 DIAGNOSIS — Z23 NEED FOR VACCINATION: Primary | ICD-10-CM

## 2021-01-31 PROCEDURE — 0001A SARSCOV2 VAC 30MCG/0.3ML IM: CPT

## 2021-02-09 ENCOUNTER — TELEPHONE (OUTPATIENT)
Dept: CARDIOLOGY | Age: 81
End: 2021-02-09

## 2021-02-09 RX ORDER — AMIODARONE HYDROCHLORIDE 200 MG/1
TABLET ORAL
Qty: 90 TABLET | Refills: 0 | OUTPATIENT
Start: 2021-02-09

## 2021-02-09 RX ORDER — AMIODARONE HYDROCHLORIDE 200 MG/1
200 TABLET ORAL DAILY
Qty: 90 TABLET | Refills: 0 | Status: SHIPPED | OUTPATIENT
Start: 2021-02-09 | End: 2021-05-17 | Stop reason: ALTCHOICE

## 2021-02-09 RX ORDER — AMIODARONE HYDROCHLORIDE 200 MG/1
200 TABLET ORAL DAILY
OUTPATIENT
Start: 2021-02-09

## 2021-02-11 ENCOUNTER — LAB ENCOUNTER (OUTPATIENT)
Dept: LAB | Age: 81
End: 2021-02-11
Attending: INTERNAL MEDICINE
Payer: MEDICARE

## 2021-02-11 DIAGNOSIS — E78.00 HYPERCHOLESTEROLEMIA: ICD-10-CM

## 2021-02-11 DIAGNOSIS — R53.83 FATIGUE, UNSPECIFIED TYPE: ICD-10-CM

## 2021-02-11 DIAGNOSIS — N18.30 STAGE 3 CHRONIC KIDNEY DISEASE, UNSPECIFIED WHETHER STAGE 3A OR 3B CKD (HCC): ICD-10-CM

## 2021-02-11 DIAGNOSIS — N17.9 AKI (ACUTE KIDNEY INJURY) (HCC): ICD-10-CM

## 2021-02-11 LAB
ALBUMIN SERPL-MCNC: 3.6 G/DL (ref 3.4–5)
ALT SERPL-CCNC: 39 U/L
ANION GAP SERPL CALC-SCNC: 5 MMOL/L (ref 0–18)
AST SERPL-CCNC: 33 U/L (ref 15–37)
BASOPHILS # BLD AUTO: 0.05 X10(3) UL (ref 0–0.2)
BASOPHILS NFR BLD AUTO: 0.7 %
BUN BLD-MCNC: 28 MG/DL (ref 7–18)
BUN/CREAT SERPL: 16.3 (ref 10–20)
CALCIUM BLD-MCNC: 9.8 MG/DL (ref 8.5–10.1)
CHLORIDE SERPL-SCNC: 104 MMOL/L (ref 98–112)
CHOLEST SMN-MCNC: 282 MG/DL (ref ?–200)
CO2 SERPL-SCNC: 30 MMOL/L (ref 21–32)
CREAT BLD-MCNC: 1.72 MG/DL
DEPRECATED RDW RBC AUTO: 48.9 FL (ref 35.1–46.3)
EOSINOPHIL # BLD AUTO: 0.16 X10(3) UL (ref 0–0.7)
EOSINOPHIL NFR BLD AUTO: 2.2 %
ERYTHROCYTE [DISTWIDTH] IN BLOOD BY AUTOMATED COUNT: 13.3 % (ref 11–15)
GLUCOSE BLD-MCNC: 101 MG/DL (ref 70–99)
HCT VFR BLD AUTO: 43.8 %
HDLC SERPL-MCNC: 72 MG/DL (ref 40–59)
HGB BLD-MCNC: 14.3 G/DL
IMM GRANULOCYTES # BLD AUTO: 0.03 X10(3) UL (ref 0–1)
IMM GRANULOCYTES NFR BLD: 0.4 %
LDLC SERPL CALC-MCNC: 187 MG/DL (ref ?–100)
LYMPHOCYTES # BLD AUTO: 1.69 X10(3) UL (ref 1–4)
LYMPHOCYTES NFR BLD AUTO: 23.4 %
MCH RBC QN AUTO: 32.4 PG (ref 26–34)
MCHC RBC AUTO-ENTMCNC: 32.6 G/DL (ref 31–37)
MCV RBC AUTO: 99.3 FL
MONOCYTES # BLD AUTO: 0.7 X10(3) UL (ref 0.1–1)
MONOCYTES NFR BLD AUTO: 9.7 %
NEUTROPHILS # BLD AUTO: 4.6 X10 (3) UL (ref 1.5–7.7)
NEUTROPHILS # BLD AUTO: 4.6 X10(3) UL (ref 1.5–7.7)
NEUTROPHILS NFR BLD AUTO: 63.6 %
NONHDLC SERPL-MCNC: 210 MG/DL (ref ?–130)
OSMOLALITY SERPL CALC.SUM OF ELEC: 294 MOSM/KG (ref 275–295)
PATIENT FASTING Y/N/NP: YES
PHOSPHATE SERPL-MCNC: 3.4 MG/DL (ref 2.5–4.9)
PLATELET # BLD AUTO: 105 10(3)UL (ref 150–450)
POTASSIUM SERPL-SCNC: 4.7 MMOL/L (ref 3.5–5.1)
RBC # BLD AUTO: 4.41 X10(6)UL
SODIUM SERPL-SCNC: 139 MMOL/L (ref 136–145)
TRIGL SERPL-MCNC: 113 MG/DL (ref 30–149)
VLDLC SERPL CALC-MCNC: 23 MG/DL (ref 0–30)
WBC # BLD AUTO: 7.2 X10(3) UL (ref 4–11)

## 2021-02-11 PROCEDURE — 84460 ALANINE AMINO (ALT) (SGPT): CPT

## 2021-02-11 PROCEDURE — 36415 COLL VENOUS BLD VENIPUNCTURE: CPT

## 2021-02-11 PROCEDURE — 80061 LIPID PANEL: CPT

## 2021-02-11 PROCEDURE — 80069 RENAL FUNCTION PANEL: CPT

## 2021-02-11 PROCEDURE — 85025 COMPLETE CBC W/AUTO DIFF WBC: CPT

## 2021-02-11 PROCEDURE — 84450 TRANSFERASE (AST) (SGOT): CPT

## 2021-02-16 ENCOUNTER — TELEPHONE (OUTPATIENT)
Dept: INTERNAL MEDICINE CLINIC | Facility: CLINIC | Age: 81
End: 2021-02-16

## 2021-02-16 NOTE — TELEPHONE ENCOUNTER
Telephone call to pt and discussed recent lab results with pt. Liver tests are improved. Cholesterol is elevated but pt is off of Statin due to elevation of liver enzymes. Pt will follow up with me as scheduled.

## 2021-02-21 ENCOUNTER — IMMUNIZATION (OUTPATIENT)
Dept: LAB | Age: 81
End: 2021-02-21
Attending: HOSPITALIST
Payer: MEDICARE

## 2021-02-21 DIAGNOSIS — Z23 NEED FOR VACCINATION: Primary | ICD-10-CM

## 2021-02-21 PROCEDURE — 0002A SARSCOV2 VAC 30MCG/0.3ML IM: CPT

## 2021-03-04 ENCOUNTER — OFFICE VISIT (OUTPATIENT)
Dept: INTERNAL MEDICINE CLINIC | Facility: CLINIC | Age: 81
End: 2021-03-04
Payer: MEDICARE

## 2021-03-04 ENCOUNTER — TELEPHONE (OUTPATIENT)
Dept: CARDIOLOGY | Age: 81
End: 2021-03-04

## 2021-03-04 VITALS
HEART RATE: 68 BPM | OXYGEN SATURATION: 98 % | HEIGHT: 73 IN | WEIGHT: 220 LBS | DIASTOLIC BLOOD PRESSURE: 70 MMHG | TEMPERATURE: 98 F | BODY MASS INDEX: 29.16 KG/M2 | SYSTOLIC BLOOD PRESSURE: 128 MMHG

## 2021-03-04 DIAGNOSIS — I67.1 CEREBRAL ANEURYSM: ICD-10-CM

## 2021-03-04 DIAGNOSIS — I48.19 PERSISTENT ATRIAL FIBRILLATION (HCC): ICD-10-CM

## 2021-03-04 DIAGNOSIS — R73.01 ABNORMAL FASTING GLUCOSE: ICD-10-CM

## 2021-03-04 DIAGNOSIS — R53.83 FATIGUE, UNSPECIFIED TYPE: ICD-10-CM

## 2021-03-04 DIAGNOSIS — I10 ESSENTIAL HYPERTENSION: Primary | ICD-10-CM

## 2021-03-04 DIAGNOSIS — N18.30 STAGE 3 CHRONIC KIDNEY DISEASE, UNSPECIFIED WHETHER STAGE 3A OR 3B CKD (HCC): ICD-10-CM

## 2021-03-04 DIAGNOSIS — R56.9 SEIZURE (HCC): ICD-10-CM

## 2021-03-04 DIAGNOSIS — E78.00 HYPERCHOLESTEROLEMIA: ICD-10-CM

## 2021-03-04 DIAGNOSIS — K75.4 AUTOIMMUNE HEPATITIS (HCC): ICD-10-CM

## 2021-03-04 DIAGNOSIS — M15.9 PRIMARY OSTEOARTHRITIS INVOLVING MULTIPLE JOINTS: ICD-10-CM

## 2021-03-04 DIAGNOSIS — D69.6 THROMBOCYTOPENIA (HCC): ICD-10-CM

## 2021-03-04 PROCEDURE — 99214 OFFICE O/P EST MOD 30 MIN: CPT | Performed by: INTERNAL MEDICINE

## 2021-03-04 RX ORDER — CLOPIDOGREL BISULFATE 75 MG/1
75 TABLET ORAL DAILY
COMMUNITY
Start: 2021-01-28 | End: 2021-04-07

## 2021-03-04 NOTE — PROGRESS NOTES
Yusef Mathew is a 79year old male. Patient presents with:  Pre-Op Exam: Diagnostic Angiogram w/ possible treatment 3/15  Dr. Ozzy Darnell [phone] P [phone]   Seizure Disorder  Aneurysm  Renal Insufficiency  Autoimmune Hepatitis    HP daily. 60 tablet 0   • Budesonide 3 MG Oral Cap DR Particles Take 3 mg by mouth every morning. • aspirin 81 MG Oral Tab Take 81 mg by mouth daily. • Clopidogrel Bisulfate 75 MG Oral Tab Take 75 mg by mouth daily.         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 This will be done at [facility] with Dr. Mohinder Barakat. I will plan to see the patient back in 1 month with a BMP.     2. Persistent atrial fibrillation ([address]  Doing well.  CPM.  Patient appears to be in a fasting glucose  Stable. CPM.  Patient's recent . CPM.      The patient indicates understanding of these issues and agrees to the plan. The patient is asked to return in 1 month with blood tests as noted above. Jostin Harrison MD  3/4/2021 needs device and assist

## 2021-03-04 NOTE — PATIENT INSTRUCTIONS
1.  Patient is to continue his current diet, medication and activity. 2.  Patient has received his Covid vaccinations.   3.  Patient is stable medically and should be able to tolerate proposed cerebral angiogram and possible procedures for his cerebral ane

## 2021-04-01 ENCOUNTER — LAB ENCOUNTER (OUTPATIENT)
Dept: LAB | Age: 81
End: 2021-04-01
Attending: INTERNAL MEDICINE
Payer: MEDICARE

## 2021-04-01 DIAGNOSIS — N18.30 STAGE 3 CHRONIC KIDNEY DISEASE, UNSPECIFIED WHETHER STAGE 3A OR 3B CKD (HCC): ICD-10-CM

## 2021-04-01 DIAGNOSIS — I10 ESSENTIAL HYPERTENSION: ICD-10-CM

## 2021-04-01 DIAGNOSIS — R73.01 ABNORMAL FASTING GLUCOSE: ICD-10-CM

## 2021-04-01 PROCEDURE — 80048 BASIC METABOLIC PNL TOTAL CA: CPT

## 2021-04-01 PROCEDURE — 36415 COLL VENOUS BLD VENIPUNCTURE: CPT

## 2021-04-07 ENCOUNTER — OFFICE VISIT (OUTPATIENT)
Dept: INTERNAL MEDICINE CLINIC | Facility: CLINIC | Age: 81
End: 2021-04-07
Payer: MEDICARE

## 2021-04-07 VITALS
BODY MASS INDEX: 28.49 KG/M2 | SYSTOLIC BLOOD PRESSURE: 130 MMHG | OXYGEN SATURATION: 97 % | WEIGHT: 215 LBS | TEMPERATURE: 98 F | DIASTOLIC BLOOD PRESSURE: 66 MMHG | HEIGHT: 73 IN | HEART RATE: 64 BPM

## 2021-04-07 DIAGNOSIS — R53.83 FATIGUE, UNSPECIFIED TYPE: ICD-10-CM

## 2021-04-07 DIAGNOSIS — I67.1 CEREBRAL ANEURYSM: ICD-10-CM

## 2021-04-07 DIAGNOSIS — K75.4 AUTOIMMUNE HEPATITIS (HCC): ICD-10-CM

## 2021-04-07 DIAGNOSIS — I48.0 PAROXYSMAL ATRIAL FIBRILLATION (HCC): ICD-10-CM

## 2021-04-07 DIAGNOSIS — I10 ESSENTIAL HYPERTENSION: Primary | ICD-10-CM

## 2021-04-07 DIAGNOSIS — E78.00 HYPERCHOLESTEROLEMIA: ICD-10-CM

## 2021-04-07 DIAGNOSIS — D69.6 THROMBOCYTOPENIA (HCC): ICD-10-CM

## 2021-04-07 DIAGNOSIS — Z12.5 PROSTATE CANCER SCREENING: ICD-10-CM

## 2021-04-07 DIAGNOSIS — R56.9 SEIZURE (HCC): ICD-10-CM

## 2021-04-07 DIAGNOSIS — N18.30 STAGE 3 CHRONIC KIDNEY DISEASE, UNSPECIFIED WHETHER STAGE 3A OR 3B CKD (HCC): ICD-10-CM

## 2021-04-07 DIAGNOSIS — R73.01 ABNORMAL FASTING GLUCOSE: ICD-10-CM

## 2021-04-07 DIAGNOSIS — Z00.00 ANNUAL PHYSICAL EXAM: ICD-10-CM

## 2021-04-07 DIAGNOSIS — M15.9 PRIMARY OSTEOARTHRITIS INVOLVING MULTIPLE JOINTS: ICD-10-CM

## 2021-04-07 PROCEDURE — 99214 OFFICE O/P EST MOD 30 MIN: CPT | Performed by: INTERNAL MEDICINE

## 2021-04-07 NOTE — PROGRESS NOTES
Sharon Avelar is a 79year old male. Patient presents with:  Checkup: 1 month follow up. New allergy added to azathioprine. Seizure Disorder  Aneurysm  Autoimmune Hepatitis    HPI:   Patient presents with:  Checkup: 1 month follow up.  New allergy ad Smoker        Types: Pipe        Quit date: 6/15/1969        Years since quittin.8      Smokeless tobacco: Never Used    Vaping Use      Vaping Use: Never used    Alcohol use: Not Currently      Comment: hard liquor 1 drink monthly    Drug use:  No necessary. 2. Paroxysmal atrial fibrillation (HCC)  Patient appears to be doing well at this time. Once again displaced his pulse appears to be quite regular. His pulse is is compatible with him being in normal sinus rhythm.   I will plan to see the fasting glucose  Stable. CPM.  I will see the patient back in 2 months with blood tests as noted above. Patient's recent BMP had an FBS of 91. The patient indicates understanding of these issues and agrees to the plan.   The patient is asked to retur

## 2021-04-07 NOTE — PATIENT INSTRUCTIONS
1.  Patient is to continue his current diet, medication and activity. 2.  Patient is to follow-up with his nephrologist, hepatologist and cardiologist regarding his medications and for evaluation of how he is doing.   3.  I will plan to see the patient yanet

## 2021-04-15 ENCOUNTER — OFFICE VISIT (OUTPATIENT)
Dept: NEPHROLOGY | Facility: CLINIC | Age: 81
End: 2021-04-15
Payer: MEDICARE

## 2021-04-15 ENCOUNTER — LAB ENCOUNTER (OUTPATIENT)
Dept: LAB | Age: 81
End: 2021-04-15
Attending: INTERNAL MEDICINE
Payer: MEDICARE

## 2021-04-15 VITALS
DIASTOLIC BLOOD PRESSURE: 70 MMHG | SYSTOLIC BLOOD PRESSURE: 120 MMHG | BODY MASS INDEX: 28.89 KG/M2 | WEIGHT: 218 LBS | HEART RATE: 61 BPM | HEIGHT: 73 IN

## 2021-04-15 DIAGNOSIS — K75.4 AUTOIMMUNE HEPATITIS (HCC): ICD-10-CM

## 2021-04-15 DIAGNOSIS — N17.9 AKI (ACUTE KIDNEY INJURY) (HCC): Primary | ICD-10-CM

## 2021-04-15 PROCEDURE — 81003 URINALYSIS AUTO W/O SCOPE: CPT | Performed by: INTERNAL MEDICINE

## 2021-04-15 PROCEDURE — 99215 OFFICE O/P EST HI 40 MIN: CPT | Performed by: INTERNAL MEDICINE

## 2021-04-15 RX ORDER — BUMETANIDE 0.5 MG/1
0.5 TABLET ORAL DAILY
Status: ON HOLD | COMMUNITY
End: 2021-12-08

## 2021-04-15 NOTE — PROGRESS NOTES
Progress Note     Patient is a 79 yrs old male with pmh of HTN, HL, BPH, autoimmune hepatitis/cirrhosis, ckd stage iii who presented for follow up     Patient was rehospitalized in October for sepsis / viral enteritis and AARON, A-fib with RVR.  Had [address] [facility] Oral Tab Take 0.5 mg by mouth daily. • Metoprolol Succinate ER 25 MG Oral Tablet 24 Hr Take 1 tablet (25 mg total) by mouth nightly. 90 tablet 0   • amiodarone HCl 200 MG Oral Tab Take 1 tablet (200 mg total) by mouth daily.  30 tablet 0   • aspirin 81 2020 and was stable for last few months with an eGFR 57 ml/min.  - had AARON with creatinine at time of discharge at 2.12 mg/dl.-> peaked at 2.68 mg/dl -> 1.65 mg/dl an eGFR 39 ml/min -> 1.84 mg/dl.   - check UA,urine eosinophile and urine protein and repeat

## 2021-04-15 NOTE — PATIENT INSTRUCTIONS
Stop midodrine and spironolactone   Monitor blood pressure and weight at home and monitor leg swelling  Urine test in 1-2 days  Blood test with next blood work   Follow up in 3 months   Take Vitamin D3 - 2000 units

## 2021-04-19 ENCOUNTER — LAB ENCOUNTER (OUTPATIENT)
Dept: LAB | Age: 81
End: 2021-04-19
Attending: INTERNAL MEDICINE
Payer: MEDICARE

## 2021-04-19 ENCOUNTER — TELEPHONE (OUTPATIENT)
Dept: INTERNAL MEDICINE CLINIC | Facility: CLINIC | Age: 81
End: 2021-04-19

## 2021-04-19 ENCOUNTER — APPOINTMENT (OUTPATIENT)
Dept: LAB | Age: 81
End: 2021-04-19
Attending: INTERNAL MEDICINE
Payer: MEDICARE

## 2021-04-19 DIAGNOSIS — Z00.00 ANNUAL PHYSICAL EXAM: Primary | ICD-10-CM

## 2021-04-19 DIAGNOSIS — N17.9 AKI (ACUTE KIDNEY INJURY) (HCC): ICD-10-CM

## 2021-04-19 PROCEDURE — 80048 BASIC METABOLIC PNL TOTAL CA: CPT

## 2021-04-19 PROCEDURE — 84156 ASSAY OF PROTEIN URINE: CPT

## 2021-04-19 PROCEDURE — 82570 ASSAY OF URINE CREATININE: CPT

## 2021-04-19 PROCEDURE — 81003 URINALYSIS AUTO W/O SCOPE: CPT

## 2021-04-19 PROCEDURE — 36415 COLL VENOUS BLD VENIPUNCTURE: CPT

## 2021-04-19 NOTE — TELEPHONE ENCOUNTER
Pt. Has his UA CNS done on 4/15 pt. Wants to know will this result be ok for his June appt. Or does he need another labs order please advise ph.  # [phone]   Routed to clinical

## 2021-04-19 NOTE — TELEPHONE ENCOUNTER
Noted.  Discussed with patient. I will place an order for a new order for urinalysis with urine culture reflex to be done at the time of the patient's visit with me in June. Apparently the prior order was used recently for Dr. Moncho Mendez.   Also patient had

## 2021-04-19 NOTE — TELEPHONE ENCOUNTER
Dr Weston Green, spoke with wife, Indiana Olvera, who states patient has UA,C+S done on 4/15/21 accidentally by lab to be done prior to Francisca visit. Dr Silvia Holbrook lab orders for urine was supposed to be done instead of yours.   Would you want patient to do another UA,

## 2021-05-12 ENCOUNTER — TELEPHONE (OUTPATIENT)
Dept: CARDIOLOGY | Age: 81
End: 2021-05-12

## 2021-05-17 ENCOUNTER — PATIENT MESSAGE (OUTPATIENT)
Dept: INTERNAL MEDICINE CLINIC | Facility: CLINIC | Age: 81
End: 2021-05-17

## 2021-05-17 ENCOUNTER — TELEPHONE (OUTPATIENT)
Dept: INTERNAL MEDICINE CLINIC | Facility: CLINIC | Age: 81
End: 2021-05-17

## 2021-05-17 RX ORDER — AMIODARONE HYDROCHLORIDE 200 MG/1
TABLET ORAL
Qty: 90 TABLET | Refills: 0 | OUTPATIENT
Start: 2021-05-17

## 2021-05-17 NOTE — TELEPHONE ENCOUNTER
From: Maranda Penaloza  To: Marvel Morse MD  Sent: 5/17/2021 1:19 PM CDT  Subject: Non-Urgent Medical Question    Dr. Mary Leo,  I'm scheduled for dental work on 5/27 that includes a deep cleaning.  You said at my last visit that I should take an

## 2021-05-17 NOTE — TELEPHONE ENCOUNTER
From: Yusef Mathew  To:  Winston Estrada MD  Sent: 5/17/2021  1:19 PM CDT  Subject: Non-Urgent Medical Question    Dr. Maribel Christine,  I'm scheduled for dental work on 5/27 that includes a deep cleaning.  You said at my last visit that I should take

## 2021-05-18 ENCOUNTER — TELEPHONE (OUTPATIENT)
Dept: INTERNAL MEDICINE CLINIC | Facility: CLINIC | Age: 81
End: 2021-05-18

## 2021-05-18 NOTE — TELEPHONE ENCOUNTER
Dr. Yaima West, I spoke with Avelino Mortensen and he will see the dentist at the end of May. He asks if antibiotics are indicated prior to his appointment. Thank you.     ----- Message from Ninfa Cantu.  Manuel Kimball sent at 5/17/2021  8:41 PM CDT -----  Regarding: Non-Urgent Medi

## 2021-05-18 NOTE — TELEPHONE ENCOUNTER
From: Yusef Mathew  To:  Winston Estrada MD  Sent: 5/17/2021 8:41 PM CDT  Subject: Non-Urgent Medical Question    Dr. Maribel Christine,  At my last visit I mentioned to you that my dentist scheduled a deep cleaning for my teeth that will go below the gu

## 2021-05-19 RX ORDER — AMOXICILLIN 500 MG/1
TABLET, FILM COATED ORAL
Qty: 4 TABLET | Refills: 1 | Status: SHIPPED | OUTPATIENT
Start: 2021-05-19 | End: 2021-06-07

## 2021-05-20 NOTE — TELEPHONE ENCOUNTER
Telephone call to patient. Situation discussed. Patient does have a screw in his heel from prior ankle surgery.   At this point I will place an order in the system for the patient take amoxicillin 500 mg #4 with a Sig-take 4 tablets (2 g) 1 hour prior to

## 2021-06-01 ENCOUNTER — LAB ENCOUNTER (OUTPATIENT)
Dept: LAB | Age: 81
End: 2021-06-01
Attending: INTERNAL MEDICINE
Payer: MEDICARE

## 2021-06-01 ENCOUNTER — TELEPHONE (OUTPATIENT)
Dept: INTERNAL MEDICINE CLINIC | Facility: CLINIC | Age: 81
End: 2021-06-01

## 2021-06-01 DIAGNOSIS — Z00.00 ANNUAL PHYSICAL EXAM: ICD-10-CM

## 2021-06-01 DIAGNOSIS — R53.83 FATIGUE, UNSPECIFIED TYPE: ICD-10-CM

## 2021-06-01 DIAGNOSIS — E78.00 HYPERCHOLESTEROLEMIA: ICD-10-CM

## 2021-06-01 PROCEDURE — 84443 ASSAY THYROID STIM HORMONE: CPT

## 2021-06-01 PROCEDURE — 80061 LIPID PANEL: CPT

## 2021-06-01 PROCEDURE — 36415 COLL VENOUS BLD VENIPUNCTURE: CPT

## 2021-06-01 PROCEDURE — 85025 COMPLETE CBC W/AUTO DIFF WBC: CPT

## 2021-06-01 PROCEDURE — 81003 URINALYSIS AUTO W/O SCOPE: CPT

## 2021-06-01 PROCEDURE — 80053 COMPREHEN METABOLIC PANEL: CPT

## 2021-06-01 NOTE — TELEPHONE ENCOUNTER
Reviewed labs. Hemoglobin 12.8. Platelet count 31,355. GFR 41. TSH 16.  Patient has had decreased GFR in the past.  Also has had thrombocytopenia in the past.  For now I think this can wait for Dr. Luke Tabor. ( Nita Romberg.  Lizeth Araujo )

## 2021-06-07 ENCOUNTER — OFFICE VISIT (OUTPATIENT)
Dept: INTERNAL MEDICINE CLINIC | Facility: CLINIC | Age: 81
End: 2021-06-07
Payer: MEDICARE

## 2021-06-07 VITALS
OXYGEN SATURATION: 98 % | DIASTOLIC BLOOD PRESSURE: 70 MMHG | SYSTOLIC BLOOD PRESSURE: 126 MMHG | HEART RATE: 60 BPM | BODY MASS INDEX: 28.89 KG/M2 | WEIGHT: 218 LBS | HEIGHT: 73 IN

## 2021-06-07 DIAGNOSIS — I67.1 CEREBRAL ANEURYSM: ICD-10-CM

## 2021-06-07 DIAGNOSIS — Z00.00 ANNUAL PHYSICAL EXAM: Primary | ICD-10-CM

## 2021-06-07 DIAGNOSIS — R73.01 ABNORMAL FASTING GLUCOSE: ICD-10-CM

## 2021-06-07 DIAGNOSIS — E78.00 HYPERCHOLESTEROLEMIA: ICD-10-CM

## 2021-06-07 DIAGNOSIS — R56.9 SEIZURE (HCC): ICD-10-CM

## 2021-06-07 DIAGNOSIS — M15.9 PRIMARY OSTEOARTHRITIS INVOLVING MULTIPLE JOINTS: ICD-10-CM

## 2021-06-07 DIAGNOSIS — I48.0 PAROXYSMAL ATRIAL FIBRILLATION (HCC): ICD-10-CM

## 2021-06-07 DIAGNOSIS — D69.6 THROMBOCYTOPENIA (HCC): ICD-10-CM

## 2021-06-07 DIAGNOSIS — R79.89 ELEVATED TSH: ICD-10-CM

## 2021-06-07 DIAGNOSIS — N18.30 STAGE 3 CHRONIC KIDNEY DISEASE, UNSPECIFIED WHETHER STAGE 3A OR 3B CKD (HCC): ICD-10-CM

## 2021-06-07 DIAGNOSIS — K75.4 AUTOIMMUNE HEPATITIS (HCC): ICD-10-CM

## 2021-06-07 DIAGNOSIS — R53.83 FATIGUE, UNSPECIFIED TYPE: ICD-10-CM

## 2021-06-07 DIAGNOSIS — I10 ESSENTIAL HYPERTENSION: ICD-10-CM

## 2021-06-07 PROCEDURE — 93000 ELECTROCARDIOGRAM COMPLETE: CPT | Performed by: INTERNAL MEDICINE

## 2021-06-07 PROCEDURE — G0439 PPPS, SUBSEQ VISIT: HCPCS | Performed by: INTERNAL MEDICINE

## 2021-06-07 PROCEDURE — 82272 OCCULT BLD FECES 1-3 TESTS: CPT | Performed by: INTERNAL MEDICINE

## 2021-06-07 PROCEDURE — 99212 OFFICE O/P EST SF 10 MIN: CPT | Performed by: INTERNAL MEDICINE

## 2021-06-07 NOTE — PATIENT INSTRUCTIONS
1.  Patient is to continue his current diet, medication and activity. 2.  Patient has had his Covid vaccine. 3.  Patient will have a TSH performed sometime this week.   4.  I will plan to see the patient back in 2 months with blood tests which will includ

## 2021-06-07 NOTE — PROGRESS NOTES
Sheryl Posadas is a 79year old male who presents for a complete physical exam.   HPI:   Mr. Shelby Moya. Jenifer Tamez is a 28-year-old white male who was seen by me on June 7, 2021 for his Medicare annual physical examination.   At the time of the examination [address]    cystoscopy w/ Trey Anna laser ablation of prostate    • DIL URETHRA STRIC,MALE,INITIAL  04/14/2014    Dialate Urethra   • FOOT SURGERY  04/17/2014    Right Ankle Surgery      Family History   Problem Relation Age of Onset   • Dementia Mother noted  RECTAL:  Stool is brown and is negative for Occult blood. Prostate is normal with no palpable nodules  MUSCULOSKELETAL: back is not tender. No pain or swelling of legs  EXTREMITIES:No edema. All peripheral pulses are intact.   Clydia Pacer and orie cholesterol of 203, triglycerides were 75, HDL cholesterol was 66 and LDL cholesterol was 124. At this point we will continue to monitor the patient's lipid panel. I will see patient back in 3 months with blood tests as noted above.     - COMP METABOLIC P constipation and hair not growing as fast as before. Patient had a repeat TSH done this week.   The patient's TSH remains elevated patient will require thyroid supplementation.    - ASSAY, THYROID STIM HORMONE; Future      Delayarnoldo Anderson MD  6/7/2021 0-No    Fall/Risk Scorin          Depression Screening (PHQ-2/PHQ-9): Over the LAST 2 WEEKS                      Advance Directives     Do you have a healthcare power of ?: Yes    Do you have a living will?: Yes     Hearing Assessment (Required coverage. PREVENTATIVE SERVICES  INDICATIONS AND SCHEDULE Internal Lab or Procedure External Lab or Procedure   Diabetes Screening      HbgA1C   Annually HgbA1C (%)   Date Value   11/01/2019 5.8 (H)    No flowsheet data found.     Fasting Blood Sugar (FS Lab or Procedure   Annual Monitoring of Persistent     Medications (ACE/ARB, digoxin, diuretics)    Potassium  Annually Potassium (mmol/L)   Date Value   06/01/2021 4.5     POTASSIUM (P) (mmol/L)   Date Value   10/14/2015 4.5    No flowsheet data found.

## 2021-06-09 ENCOUNTER — LAB ENCOUNTER (OUTPATIENT)
Dept: LAB | Age: 81
End: 2021-06-09
Attending: INTERNAL MEDICINE
Payer: MEDICARE

## 2021-06-09 DIAGNOSIS — R79.89 ELEVATED TSH: ICD-10-CM

## 2021-06-09 DIAGNOSIS — R53.83 FATIGUE, UNSPECIFIED TYPE: ICD-10-CM

## 2021-06-09 PROCEDURE — 36415 COLL VENOUS BLD VENIPUNCTURE: CPT

## 2021-06-09 PROCEDURE — 84443 ASSAY THYROID STIM HORMONE: CPT

## 2021-06-17 ENCOUNTER — PATIENT MESSAGE (OUTPATIENT)
Dept: INTERNAL MEDICINE CLINIC | Facility: CLINIC | Age: 81
End: 2021-06-17

## 2021-06-17 ENCOUNTER — TELEPHONE (OUTPATIENT)
Dept: INTERNAL MEDICINE CLINIC | Facility: CLINIC | Age: 81
End: 2021-06-17

## 2021-06-17 ENCOUNTER — PATIENT MESSAGE (OUTPATIENT)
Dept: NEPHROLOGY | Facility: CLINIC | Age: 81
End: 2021-06-17

## 2021-06-17 RX ORDER — LEVOTHYROXINE SODIUM 0.03 MG/1
25 TABLET ORAL
Qty: 15 TABLET | Refills: 0 | Status: SHIPPED | OUTPATIENT
Start: 2021-06-17 | End: 2021-06-28 | Stop reason: DRUGHIGH

## 2021-06-17 RX ORDER — METOPROLOL SUCCINATE 25 MG/1
25 TABLET, EXTENDED RELEASE ORAL NIGHTLY
Qty: 90 TABLET | Refills: 0 | Status: SHIPPED | OUTPATIENT
Start: 2021-06-17 | End: 2021-09-15

## 2021-06-17 NOTE — TELEPHONE ENCOUNTER
----- Message from Matthew Vogel. Marycarmen Colin sent at 6/17/2021  1:11 PM CDT -----  Regarding: Test Results Question  Contact: [phone]  Dr. John Corrales,  Have you decided whether or not to prescribe medication for my thyroid?   My TSH on 6/1 was 16.3 and the repeat

## 2021-06-17 NOTE — TELEPHONE ENCOUNTER
Pt requesting refill on metoprolol succinate ER 25mg tablets. LOV 4/15/21. RTC in 3 months. F/U scheduled 7/15/21. Medication noted as taking. Rx pended if appropriate.

## 2021-06-17 NOTE — TELEPHONE ENCOUNTER
From: Sea Plummer  To: Janina Remy MD  Sent: 6/17/2021 1:02 PM CDT  Subject: Prescription Question    Dr. Reinaldo Johnson,  If you want me to continue taking Metoprolol Succinate ER 25 mg tabs, once per day at night, I will need a new prescription.

## 2021-06-17 NOTE — TELEPHONE ENCOUNTER
From: Delon Bridges  To: Brittani Ray MD  Sent: 6/17/2021 1:11 PM CDT  Subject: Test Results Question    Dr. Yaima West,  Have you decided whether or not to prescribe medication for my thyroid?  My TSH on 6/1 was 16.3 and the repeat was 17.8 on

## 2021-06-18 NOTE — TELEPHONE ENCOUNTER
Telephone call to patient. Patient's TSH remains elevated at 17. This is obviously in the hypothyroid range. I contacted the patient and told him that I would start him on Synthroid 0.025 mg orally daily for 15 days.   Patient to call me back in about 10

## 2021-06-28 ENCOUNTER — PATIENT MESSAGE (OUTPATIENT)
Dept: INTERNAL MEDICINE CLINIC | Facility: CLINIC | Age: 81
End: 2021-06-28

## 2021-06-28 RX ORDER — LEVOTHYROXINE SODIUM 0.05 MG/1
50 TABLET ORAL
Qty: 15 TABLET | Refills: 0 | Status: SHIPPED | OUTPATIENT
Start: 2021-06-28 | End: 2021-07-15 | Stop reason: DRUGHIGH

## 2021-06-28 NOTE — TELEPHONE ENCOUNTER
From: Ace Stinson  To: Desiree Luna MD  Sent: 6/28/2021 2:39 PM CDT  Subject: Prescription Question    Sharmin Pfeiffer,  I have taken 10 of the 15 Levothyroxin 25MCG pills you prescribed for me.  I will need a new prescription for the next level

## 2021-06-28 NOTE — TELEPHONE ENCOUNTER
Called patient who is on 25mcg levothyroxine per DR. GUSTAFSON - then to upgrade to 50 mcg for 15 days - RX sent.  Patient will call when almost done to get to final dosage upgrade of 100 mcg

## 2021-07-12 ENCOUNTER — LAB ENCOUNTER (OUTPATIENT)
Dept: LAB | Age: 81
End: 2021-07-12
Attending: INTERNAL MEDICINE
Payer: MEDICARE

## 2021-07-12 ENCOUNTER — PATIENT MESSAGE (OUTPATIENT)
Dept: INTERNAL MEDICINE CLINIC | Facility: CLINIC | Age: 81
End: 2021-07-12

## 2021-07-12 DIAGNOSIS — N17.9 AKI (ACUTE KIDNEY INJURY) (HCC): ICD-10-CM

## 2021-07-12 PROCEDURE — 87205 SMEAR GRAM STAIN: CPT

## 2021-07-12 RX ORDER — LEVOTHYROXINE SODIUM 0.1 MG/1
100 TABLET ORAL
Qty: 90 TABLET | Refills: 3 | Status: SHIPPED | OUTPATIENT
Start: 2021-07-12

## 2021-07-12 NOTE — TELEPHONE ENCOUNTER
Noted.  I have approved a refill of the patient's levothyroxine for 100 mcg number 91 tablet daily with 3 refills.

## 2021-07-12 NOTE — TELEPHONE ENCOUNTER
To Dr. GUSTAFSON: Please review new increased dose pended, Levo 100 mcg daily    Of note, patient was previously sent in levo 50 mcg

## 2021-07-12 NOTE — TELEPHONE ENCOUNTER
From: Parag Zaldivar  To: Malena Church MD  Sent: 7/12/2021 9:20 AM CDT  Subject: Prescription Question    Dr. Eliana Wang,  I have enough Levothyroxin 50MCG tablets to last the rest of the week.  I need a new prescription for the next dose of that

## 2021-07-12 NOTE — TELEPHONE ENCOUNTER
Friend Ours  to Alonso Rogers MD        7/12/21 9:20 AM  Dr. Maribel Christine,  I have enough Levothyroxin 50MCG tablets to last the rest of the week. I need a new prescription for the next dose of that drug. Thank you.   Marge Lazo

## 2021-07-15 ENCOUNTER — OFFICE VISIT (OUTPATIENT)
Dept: NEPHROLOGY | Facility: CLINIC | Age: 81
End: 2021-07-15
Payer: MEDICARE

## 2021-07-15 VITALS
HEART RATE: 59 BPM | BODY MASS INDEX: 28.63 KG/M2 | DIASTOLIC BLOOD PRESSURE: 50 MMHG | SYSTOLIC BLOOD PRESSURE: 102 MMHG | WEIGHT: 216 LBS | HEIGHT: 73 IN

## 2021-07-15 DIAGNOSIS — N18.30 STAGE 3 CHRONIC KIDNEY DISEASE, UNSPECIFIED WHETHER STAGE 3A OR 3B CKD (HCC): Primary | ICD-10-CM

## 2021-07-15 DIAGNOSIS — I50.30 HEART FAILURE WITH PRESERVED EJECTION FRACTION, UNSPECIFIED HF CHRONICITY (HCC): ICD-10-CM

## 2021-07-15 PROCEDURE — 99214 OFFICE O/P EST MOD 30 MIN: CPT | Performed by: INTERNAL MEDICINE

## 2021-07-15 NOTE — PROGRESS NOTES
Progress Note     Patient is a 79 yrs old male with pmh of HTN, HL, BPH, CKD stage III, autoimmune hepatitis/cirrhosis, ckd stage iii who presented for follow up     Patient was rehospitalized in October for sepsis / viral enteritis and AARON, A-fib with R Medications   Medication Sig Dispense Refill   • Levothyroxine Sodium 100 MCG Oral Tab Take 1 tablet (100 mcg total) by mouth before breakfast. 90 tablet 3   • Metoprolol Succinate ER 25 MG Oral Tablet 24 Hr Take 1 tablet (25 mg total) by mouth nightly.  [address] noted  Musculoskeletal: no deformities  Extremities: trace edema  Neurological:  Grossly normal       ASSESSMENT/PLAN:     1. CKD III : non proteinuria   - BUN/Cr 19/1.2 mg/dl in July 2020 with an eGFR 57 ml/min.  - had AARON with creatinine at time of disch

## 2021-07-28 ENCOUNTER — TELEPHONE (OUTPATIENT)
Dept: INTERNAL MEDICINE CLINIC | Facility: CLINIC | Age: 81
End: 2021-07-28

## 2021-07-28 DIAGNOSIS — E03.9 HYPOTHYROIDISM, UNSPECIFIED TYPE: Primary | ICD-10-CM

## 2021-07-28 DIAGNOSIS — R53.83 FATIGUE, UNSPECIFIED TYPE: ICD-10-CM

## 2021-07-28 NOTE — TELEPHONE ENCOUNTER
please see MyChart message from patient below and advise. Patient had TSH done on 6/1 and 6/9, both elevated.

## 2021-07-28 NOTE — TELEPHONE ENCOUNTER
----- Message from Kris Saywer.  Michelle Ramirez sent at 7/28/2021  3:42 PM CDT -----  Regarding: Non-Urgent Medical Question  Contact: [phone]  Dr. Dieter Nichols,  My next appointment is Monday, 8/9 and I scheduled the labs you requested to have completed prior to marisela

## 2021-08-05 ENCOUNTER — LAB ENCOUNTER (OUTPATIENT)
Dept: LAB | Age: 81
End: 2021-08-05
Attending: INTERNAL MEDICINE
Payer: MEDICARE

## 2021-08-05 DIAGNOSIS — R53.83 FATIGUE, UNSPECIFIED TYPE: ICD-10-CM

## 2021-08-05 DIAGNOSIS — N18.30 STAGE 3 CHRONIC KIDNEY DISEASE, UNSPECIFIED WHETHER STAGE 3A OR 3B CKD (HCC): ICD-10-CM

## 2021-08-05 DIAGNOSIS — K75.4 AUTOIMMUNE HEPATITIS (HCC): ICD-10-CM

## 2021-08-05 DIAGNOSIS — E03.9 HYPOTHYROIDISM, UNSPECIFIED TYPE: ICD-10-CM

## 2021-08-05 DIAGNOSIS — E78.00 HYPERCHOLESTEROLEMIA: ICD-10-CM

## 2021-08-05 DIAGNOSIS — D69.6 THROMBOCYTOPENIA (HCC): ICD-10-CM

## 2021-08-05 DIAGNOSIS — R73.01 ABNORMAL FASTING GLUCOSE: ICD-10-CM

## 2021-08-05 LAB
ALBUMIN SERPL-MCNC: 3.6 G/DL (ref 3.4–5)
ALBUMIN/GLOB SERPL: 1.2 {RATIO} (ref 1–2)
ALP LIVER SERPL-CCNC: 81 U/L
ALT SERPL-CCNC: 29 U/L
ANION GAP SERPL CALC-SCNC: 5 MMOL/L (ref 0–18)
AST SERPL-CCNC: 26 U/L (ref 15–37)
BASOPHILS # BLD AUTO: 0.06 X10(3) UL (ref 0–0.2)
BASOPHILS NFR BLD AUTO: 1 %
BILIRUB SERPL-MCNC: 1.2 MG/DL (ref 0.1–2)
BUN BLD-MCNC: 28 MG/DL (ref 7–18)
BUN/CREAT SERPL: 19.2 (ref 10–20)
CALCIUM BLD-MCNC: 9 MG/DL (ref 8.5–10.1)
CHLORIDE SERPL-SCNC: 107 MMOL/L (ref 98–112)
CHOLEST SMN-MCNC: 178 MG/DL (ref ?–200)
CO2 SERPL-SCNC: 27 MMOL/L (ref 21–32)
CREAT BLD-MCNC: 1.46 MG/DL
DEPRECATED RDW RBC AUTO: 45.6 FL (ref 35.1–46.3)
EOSINOPHIL # BLD AUTO: 0.24 X10(3) UL (ref 0–0.7)
EOSINOPHIL NFR BLD AUTO: 4.1 %
ERYTHROCYTE [DISTWIDTH] IN BLOOD BY AUTOMATED COUNT: 13.2 % (ref 11–15)
GLOBULIN PLAS-MCNC: 3.1 G/DL (ref 2.8–4.4)
GLUCOSE BLD-MCNC: 89 MG/DL (ref 70–99)
HCT VFR BLD AUTO: 39.1 %
HDLC SERPL-MCNC: 57 MG/DL (ref 40–59)
HGB BLD-MCNC: 13.1 G/DL
IMM GRANULOCYTES # BLD AUTO: 0.02 X10(3) UL (ref 0–1)
IMM GRANULOCYTES NFR BLD: 0.3 %
LDLC SERPL CALC-MCNC: 107 MG/DL (ref ?–100)
LYMPHOCYTES # BLD AUTO: 1.4 X10(3) UL (ref 1–4)
LYMPHOCYTES NFR BLD AUTO: 23.8 %
M PROTEIN MFR SERPL ELPH: 6.7 G/DL (ref 6.4–8.2)
MCH RBC QN AUTO: 31.4 PG (ref 26–34)
MCHC RBC AUTO-ENTMCNC: 33.5 G/DL (ref 31–37)
MCV RBC AUTO: 93.8 FL
MONOCYTES # BLD AUTO: 0.58 X10(3) UL (ref 0.1–1)
MONOCYTES NFR BLD AUTO: 9.9 %
NEUTROPHILS # BLD AUTO: 3.58 X10 (3) UL (ref 1.5–7.7)
NEUTROPHILS # BLD AUTO: 3.58 X10(3) UL (ref 1.5–7.7)
NEUTROPHILS NFR BLD AUTO: 60.9 %
NONHDLC SERPL-MCNC: 121 MG/DL (ref ?–130)
OSMOLALITY SERPL CALC.SUM OF ELEC: 293 MOSM/KG (ref 275–295)
PATIENT FASTING Y/N/NP: YES
PATIENT FASTING Y/N/NP: YES
PLATELET # BLD AUTO: 97 10(3)UL (ref 150–450)
POTASSIUM SERPL-SCNC: 4.3 MMOL/L (ref 3.5–5.1)
RBC # BLD AUTO: 4.17 X10(6)UL
SODIUM SERPL-SCNC: 139 MMOL/L (ref 136–145)
TRIGL SERPL-MCNC: 76 MG/DL (ref 30–149)
TSI SER-ACNC: 3.47 MIU/ML (ref 0.36–3.74)
VLDLC SERPL CALC-MCNC: 13 MG/DL (ref 0–30)
WBC # BLD AUTO: 5.9 X10(3) UL (ref 4–11)

## 2021-08-05 PROCEDURE — 84443 ASSAY THYROID STIM HORMONE: CPT

## 2021-08-05 PROCEDURE — 85025 COMPLETE CBC W/AUTO DIFF WBC: CPT

## 2021-08-05 PROCEDURE — 80053 COMPREHEN METABOLIC PANEL: CPT

## 2021-08-05 PROCEDURE — 80061 LIPID PANEL: CPT

## 2021-08-05 PROCEDURE — 36415 COLL VENOUS BLD VENIPUNCTURE: CPT

## 2021-08-09 ENCOUNTER — OFFICE VISIT (OUTPATIENT)
Dept: INTERNAL MEDICINE CLINIC | Facility: CLINIC | Age: 81
End: 2021-08-09
Payer: MEDICARE

## 2021-08-09 VITALS
OXYGEN SATURATION: 98 % | BODY MASS INDEX: 27.96 KG/M2 | HEIGHT: 73 IN | HEART RATE: 68 BPM | TEMPERATURE: 98 F | WEIGHT: 211 LBS | SYSTOLIC BLOOD PRESSURE: 130 MMHG | DIASTOLIC BLOOD PRESSURE: 70 MMHG

## 2021-08-09 DIAGNOSIS — R79.89 ELEVATED TSH: ICD-10-CM

## 2021-08-09 DIAGNOSIS — E78.00 HYPERCHOLESTEROLEMIA: ICD-10-CM

## 2021-08-09 DIAGNOSIS — M15.9 PRIMARY OSTEOARTHRITIS INVOLVING MULTIPLE JOINTS: ICD-10-CM

## 2021-08-09 DIAGNOSIS — R79.89 ABNORMAL LFTS: ICD-10-CM

## 2021-08-09 DIAGNOSIS — R73.01 ABNORMAL FASTING GLUCOSE: ICD-10-CM

## 2021-08-09 DIAGNOSIS — R53.83 FATIGUE, UNSPECIFIED TYPE: ICD-10-CM

## 2021-08-09 DIAGNOSIS — D69.6 THROMBOCYTOPENIA (HCC): ICD-10-CM

## 2021-08-09 DIAGNOSIS — I10 ESSENTIAL HYPERTENSION: Primary | ICD-10-CM

## 2021-08-09 DIAGNOSIS — I48.0 PAROXYSMAL ATRIAL FIBRILLATION (HCC): ICD-10-CM

## 2021-08-09 DIAGNOSIS — R56.9 SEIZURE (HCC): ICD-10-CM

## 2021-08-09 DIAGNOSIS — N18.30 STAGE 3 CHRONIC KIDNEY DISEASE, UNSPECIFIED WHETHER STAGE 3A OR 3B CKD (HCC): ICD-10-CM

## 2021-08-09 DIAGNOSIS — I67.1 CEREBRAL ANEURYSM: ICD-10-CM

## 2021-08-09 DIAGNOSIS — E03.9 HYPOTHYROIDISM, UNSPECIFIED TYPE: ICD-10-CM

## 2021-08-09 DIAGNOSIS — K75.4 AUTOIMMUNE HEPATITIS (HCC): ICD-10-CM

## 2021-08-09 PROCEDURE — 99214 OFFICE O/P EST MOD 30 MIN: CPT | Performed by: INTERNAL MEDICINE

## 2021-08-09 NOTE — PROGRESS NOTES
Osbaldo Mckenna is a 79year old male. Patient presents with:  Checkup: 2 month   Autoimmune Hepatitis  Hypertension  Atrial Fibrillation  Hyperlipidemia    HPI:   Patient presents with:  Checkup: 2 month   Autoimmune Hepatitis  Hypertension  Atrial Fi Pulse 68   Temp 97.6 °F (36.4 °C) (Oral)   Ht 6' 1\" (1.854 m)   Wt 211 lb (95.7 kg)   SpO2 98%   BMI 27.84 kg/m²   GENERAL: well developed, well nourished in no acute distress  HEENT: normal oropharynx, normal TM's.  Ears are normal. Eyes are normal  NECK platelet count of 03,717. Patient's hemoglobin is 13.1, hematocrit 39.1 and WBC is 5900. 10. Fatigue, unspecified type  Stable.   CPM.    11. Hypothyroidism, unspecified type  Doing well.  CPM.  Patient's is currently taking Synthroid 0.10 mg orally leena

## 2021-08-09 NOTE — PATIENT INSTRUCTIONS
1.  Patient is to continue his current diet, medication and activity. 2.  Patient has had his Covid vaccines. 3.  Patient to follow-up with his consultants as he is scheduled to do.   4.  I will see the patient back in 3 months with blood tests as ordered

## 2021-09-13 ENCOUNTER — PATIENT MESSAGE (OUTPATIENT)
Dept: NEPHROLOGY | Facility: CLINIC | Age: 81
End: 2021-09-13

## 2021-09-13 NOTE — TELEPHONE ENCOUNTER
From: Glenis Marshall  To: Patricia Bruno MD  Sent: 9/13/2021 2:15 PM CDT  Subject: New Prescription    Dr. Jesse Padgett,  I will soon need a new prescription for Metoprolol ER 25MG TAB ING. My pharmacy is [facility], [phone].   Thank

## 2021-09-13 NOTE — TELEPHONE ENCOUNTER
Pt requesting metoprolol LOV 7/15/21. RTC in 4 months. F/U scheduled 11/16/21. Medication noted as taking. Rx pended if appropriate.

## 2021-09-15 RX ORDER — METOPROLOL SUCCINATE 25 MG/1
25 TABLET, EXTENDED RELEASE ORAL NIGHTLY
Qty: 90 TABLET | Refills: 1 | Status: SHIPPED | OUTPATIENT
Start: 2021-09-15

## 2021-11-05 ENCOUNTER — TELEPHONE (OUTPATIENT)
Dept: INTERNAL MEDICINE CLINIC | Facility: CLINIC | Age: 81
End: 2021-11-05

## 2021-11-05 ENCOUNTER — LAB ENCOUNTER (OUTPATIENT)
Dept: LAB | Age: 81
End: 2021-11-05
Attending: INTERNAL MEDICINE
Payer: MEDICARE

## 2021-11-05 DIAGNOSIS — D69.6 THROMBOCYTOPENIA (HCC): ICD-10-CM

## 2021-11-05 DIAGNOSIS — E78.00 HYPERCHOLESTEROLEMIA: ICD-10-CM

## 2021-11-05 DIAGNOSIS — R79.89 ABNORMAL LFTS: ICD-10-CM

## 2021-11-05 DIAGNOSIS — N18.30 STAGE 3 CHRONIC KIDNEY DISEASE, UNSPECIFIED WHETHER STAGE 3A OR 3B CKD (HCC): ICD-10-CM

## 2021-11-05 DIAGNOSIS — R53.83 FATIGUE, UNSPECIFIED TYPE: ICD-10-CM

## 2021-11-05 PROCEDURE — 80061 LIPID PANEL: CPT

## 2021-11-05 PROCEDURE — 85025 COMPLETE CBC W/AUTO DIFF WBC: CPT

## 2021-11-05 PROCEDURE — 80076 HEPATIC FUNCTION PANEL: CPT

## 2021-11-05 PROCEDURE — 36415 COLL VENOUS BLD VENIPUNCTURE: CPT

## 2021-11-05 PROCEDURE — 80048 BASIC METABOLIC PNL TOTAL CA: CPT

## 2021-11-05 NOTE — TELEPHONE ENCOUNTER
To Dr Marco Duncan you please review recent lab results for Dr GUSTAFSON patient. Ok to wait for his return?  Thank you  Platelet level continues to drop

## 2021-11-08 NOTE — TELEPHONE ENCOUNTER
Pt appt was r/s by office from 11/11/21 to 1/20/22 on Dr Emilio Tucker schedule  Please call patient to discuss results of labs  Does pt need to have labs drawn prior to 1/20/22 appt?   Tasked to nursing

## 2021-11-16 ENCOUNTER — OFFICE VISIT (OUTPATIENT)
Dept: NEPHROLOGY | Facility: CLINIC | Age: 81
End: 2021-11-16
Payer: MEDICARE

## 2021-11-16 VITALS
BODY MASS INDEX: 27.83 KG/M2 | SYSTOLIC BLOOD PRESSURE: 101 MMHG | DIASTOLIC BLOOD PRESSURE: 55 MMHG | HEART RATE: 78 BPM | WEIGHT: 210 LBS | HEIGHT: 73 IN

## 2021-11-16 DIAGNOSIS — N18.30 STAGE 3 CHRONIC KIDNEY DISEASE, UNSPECIFIED WHETHER STAGE 3A OR 3B CKD (HCC): Primary | ICD-10-CM

## 2021-11-16 DIAGNOSIS — K75.4 AUTOIMMUNE HEPATITIS (HCC): ICD-10-CM

## 2021-11-16 PROCEDURE — 99215 OFFICE O/P EST HI 40 MIN: CPT | Performed by: INTERNAL MEDICINE

## 2021-11-16 NOTE — PROGRESS NOTES
Progress Note     Patient is a 79 yrs old male with pmh of HTN, HL, BPH, CKD stage III, autoimmune hepatitis/cirrhosis, ckd stage iii, diastolic dysfunction who presented for follow up     Patient was rehospitalized in October for sepsis / viral enteriti Medication Sig Dispense Refill   • metoprolol succinate 25 MG Oral Tablet 24 Hr Take 1 tablet (25 mg total) by mouth nightly.  90 tablet 1   • Levothyroxine Sodium 100 MCG Oral Tab Take 1 tablet (100 mcg total) by mouth before breakfast. 90 tablet 3   • C AARON with creatinine at time of discharge at 2.12 mg/dl.-> peaked at 2.68 mg/dl  -> 1.59 mg/dl with an eGFR 41 ml/min ->1.33 mg/dl with an eGFR 50 ml/min   -  UA,urine eosinophile and urine protein unremarkable   - ESR 52 and ANCA negative in beginning   -

## 2021-12-06 ENCOUNTER — HOSPITAL ENCOUNTER (INPATIENT)
Facility: HOSPITAL | Age: 81
LOS: 3 days | Discharge: HOME | DRG: 392 | End: 2021-12-09
Attending: EMERGENCY MEDICINE | Admitting: HOSPITALIST
Payer: MEDICARE

## 2021-12-06 ENCOUNTER — APPOINTMENT (OUTPATIENT)
Dept: GENERAL RADIOLOGY | Facility: HOSPITAL | Age: 81
DRG: 392 | End: 2021-12-06
Attending: EMERGENCY MEDICINE
Payer: MEDICARE

## 2021-12-06 ENCOUNTER — APPOINTMENT (OUTPATIENT)
Dept: CT IMAGING | Facility: HOSPITAL | Age: 81
DRG: 392 | End: 2021-12-06
Attending: EMERGENCY MEDICINE
Payer: MEDICARE

## 2021-12-06 DIAGNOSIS — R55 SYNCOPE AND COLLAPSE: Primary | ICD-10-CM

## 2021-12-06 DIAGNOSIS — I95.1 ORTHOSTATIC HYPOTENSION: ICD-10-CM

## 2021-12-06 PROCEDURE — 71045 X-RAY EXAM CHEST 1 VIEW: CPT | Performed by: EMERGENCY MEDICINE

## 2021-12-06 PROCEDURE — 70450 CT HEAD/BRAIN W/O DYE: CPT | Performed by: EMERGENCY MEDICINE

## 2021-12-06 PROCEDURE — 99223 1ST HOSP IP/OBS HIGH 75: CPT | Performed by: HOSPITALIST

## 2021-12-06 RX ORDER — ZOLPIDEM TARTRATE 5 MG/1
5 TABLET ORAL NIGHTLY PRN
Status: DISCONTINUED | OUTPATIENT
Start: 2021-12-06 | End: 2021-12-09

## 2021-12-06 RX ORDER — ACETAMINOPHEN 325 MG/1
650 TABLET ORAL EVERY 6 HOURS PRN
Status: DISCONTINUED | OUTPATIENT
Start: 2021-12-06 | End: 2021-12-09

## 2021-12-06 RX ORDER — SODIUM CHLORIDE 9 MG/ML
INJECTION, SOLUTION INTRAVENOUS CONTINUOUS
Status: DISCONTINUED | OUTPATIENT
Start: 2021-12-06 | End: 2021-12-07

## 2021-12-06 RX ORDER — ONDANSETRON 2 MG/ML
4 INJECTION INTRAMUSCULAR; INTRAVENOUS EVERY 6 HOURS PRN
Status: DISCONTINUED | OUTPATIENT
Start: 2021-12-06 | End: 2021-12-09

## 2021-12-06 RX ORDER — LEVOTHYROXINE SODIUM 0.1 MG/1
100 TABLET ORAL
Status: DISCONTINUED | OUTPATIENT
Start: 2021-12-06 | End: 2021-12-09

## 2021-12-06 RX ORDER — HEPARIN SODIUM 5000 [USP'U]/ML
5000 INJECTION, SOLUTION INTRAVENOUS; SUBCUTANEOUS EVERY 12 HOURS SCHEDULED
Status: DISCONTINUED | OUTPATIENT
Start: 2021-12-06 | End: 2021-12-09

## 2021-12-06 RX ORDER — DOCUSATE SODIUM 100 MG/1
100 CAPSULE, LIQUID FILLED ORAL 2 TIMES DAILY
Status: DISCONTINUED | OUTPATIENT
Start: 2021-12-06 | End: 2021-12-09

## 2021-12-06 NOTE — ED QUICK NOTES
Orders for admission, patient is aware of plan and ready to go upstairs. Any questions, please call ED GARTH whittaker at extension [phone].      Type of COVID test sent: Rapid negative  COVID Suspicion level: Low    LOC at time of transport: x4    Other pertinent in

## 2021-12-06 NOTE — H&P
Timothy Anne 84 Patient Status:  Emergency    1940 MRN [id number]   Location [address] Attending Jesus Chávez MD   Hosp Day # 0 PCP Avelino Verde MD     Da • Heart Disease Mother         CAD   • Heart Disease Sister    • Heart Disease Father         CAD + ASHD   • Renal Disease Father    • Blood Disorder Brother         blood clot in leg   • Cancer Brother         Prostate cancer in remission      reports t moist.  Head:  Normocephalic, atraumatic. Neck:  Supple, non-tender, no carotid bruit, no jugular venous distention, no lymphadenopathy, no thyromegaly.   Respiratory:  Lungs are clear to auscultation, respirations are non-labored, breath sounds are equal, brain aneurysm  Head CT pending    Prophylaxis  Subcutaneous heparin    CODE STATUS  Full    Primary care physician  Jamin Gore MD    Disposition  Clinical course will dictate outcome      Kelli Pizano MD  12/6/2021  7:34 AM

## 2021-12-06 NOTE — ED PROVIDER NOTES
Patient Seen in: [facility] AND [facility] Emergency Department    History   Patient presents with:  Fall  Syncope  Nausea/Vomiting/Diarrhea      HPI    23-year-old male presents the ER for syncopal episode.   Patient had a witnessed syncopal episode this morning w cancer in remission       Smoking Status: Social History    Socioeconomic History      Marital status:     Tobacco Use      Smoking status: Former Smoker        Types: Pipe        Quit date: 6/15/1969        Years since quittin.5      Smokeless regular rhythm. Heart sounds: Normal heart sounds. Pulmonary:      Effort: Pulmonary effort is normal.      Breath sounds: Normal breath sounds. Abdominal:      General: Bowel sounds are normal.      Palpations: Abdomen is soft.    Musculoskeletal: 2.9 (*)     All other components within normal limits   IRON AND TIBC - Abnormal; Notable for the following components:    Iron 50 (*)     Transferrin 164 (*)     All other components within normal limits   SED RATE, WESTERGREN (AUTOMATED) - Abnormal; Nota PTT, ACTIVATED - Normal   FIBRINOGEN ACTIVITY - Normal   RETICULOCYTE COUNT - Normal   AFP, TUMOR MARKER, SERUM - Normal   LDH - Normal   AMMONIA, PLASMA - Normal   RAPID SARS-COV-2 BY PCR - Normal   CBC WITH DIFFERENTIAL WITH PLATELET    Narrative: DIFFERENTIAL[[id number]]          Abnormal            Final result                 Please view results for these tests on the individual orders. MD BLOOD SMEAR CONSULT   CBC WITH DIFFERENTIAL WITH PLATELET    Narrative:      The following orders were creat reports. Complicating Factors: The patient already has does not have any pertinent problems on file. to contribute to the complexity of this ED evaluation. ED Course: 81-year-old male presents the ER with complaint of syncopal episode upon standing. Unknown    Anemia D64.9 Unknown Unknown    Orthostatic hypotension I95.1 12/6/2021 Unknown

## 2021-12-06 NOTE — ED INITIAL ASSESSMENT (HPI)
PT BROUGHT VIA EMS FROM HOME. PER PT STATES HE HAS HAD N/V THAT HAS GOTTEN WORSE FOR SEVERAL DAYS. PT STATES HE HAD SYNCOPE EPISODE AROUND 2300, HAS ABRASION TO LEFT FA. PT DENIES HITTING HEAD.  DENIES BEING ON BLOOD THINNERS,

## 2021-12-07 PROCEDURE — 99233 SBSQ HOSP IP/OBS HIGH 50: CPT | Performed by: HOSPITALIST

## 2021-12-07 NOTE — PLAN OF CARE
Patient alert and oriented x4, saturating well on room air. Orthostatic BP completed, still positive, MD aware. No complaints of nausea and vomiting, dizziness per pt. IVF continued.  Fall precautions maintained, call light within reach, bed locked in low p

## 2021-12-07 NOTE — PLAN OF CARE
Brianne Ordoñez is A&Ox4 on room air. Orthostatic BP q8hr continued. Pt was negative this evening. VS stable. No complaints of dizziness. IVF continued at 125ml/hr. Bed alarm on. Call light within reach. Pt instructed to call RN for assistance.  Will continue to mon

## 2021-12-07 NOTE — PROGRESS NOTES
[facility]  Hospitalist Progress Note     Lewisbradley Villa Patient Status:  Inpatient    1940  [de-identified]year old CSN [id number]   Location 306/306-A Attending Yashira Rinaldi MD   Hosp Day # 1 PCP Js Yanez MD     Assessment & °F (37.2 °C)] 98.1 °F (36.7 °C)  Pulse:  [] 99  Resp:  [16-18] 18  BP: ()/(48-69) 119/65  Gen: A+Ox3. No distress. HEENT: NCAT, neck supple, no carotid bruit. CV: RRR, S1S2, and intact distal pulses. No gallop, rub, murmur.   Pulm: Effort an extreme detail. We discussed needing to repeat a CBC today. We discussed slowly increasing activity. Discussed with spouse at the bedside.

## 2021-12-07 NOTE — OCCUPATIONAL THERAPY NOTE
OCCUPATIONAL THERAPY EVALUATION - INPATIENT     Room Number: 306/306-A  Evaluation Date: 12/7/2021  Type of Evaluation: Initial  Presenting Problem:  (syncope and collapse)    Physician Order: IP Consult to Occupational Therapy  Reason for Therapy: ADL/I • Borderline high blood pressure 2009   • Borderline high cholesterol    • BPH (benign prostatic hyperplasia) 2009   • Diverticulosis    • Elevated liver enzymes    • Enlarged prostate 2009   • Essential hypertension    • Hearing impairment    • Left axi taking off regular upper body clothing?: None  -   Taking care of personal grooming such as brushing teeth?: None  -   Eating meals?: None    AM-PAC Score:  Score: 23  Approx Degree of Impairment: 15.86%  Standardized Score (AM-PAC Scale): 51.12  CMS Modif

## 2021-12-07 NOTE — PHYSICAL THERAPY NOTE
PHYSICAL THERAPY EVALUATION - INPATIENT     Room Number: 306/306-A  Evaluation Date: 12/7/2021  Type of Evaluation: Initial   Physician Order: PT Eval and Treat    Presenting Problem: Syncope and collapse  Reason for Therapy: Mobility Dysfunction and Disc Recommendations: Home      PHYSICAL THERAPY MEDICAL/SOCIAL HISTORY     History related to current admission: hx of CKD III, constipation, vision impairment, hearing impairment, BPH     Problem List  Principal Problem:    Syncope and collapse  Active Proble +  Dynamic Standing: Fair +    AM-PAC '6-Clicks' INPATIENT SHORT FORM - BASIC MOBILITY  How much difficulty does the patient currently have. ..   Patient Difficulty: Turning over in bed (including adjusting bedclothes, sheets and blankets)?: None   Patient D

## 2021-12-08 PROCEDURE — 99222 1ST HOSP IP/OBS MODERATE 55: CPT | Performed by: STUDENT IN AN ORGANIZED HEALTH CARE EDUCATION/TRAINING PROGRAM

## 2021-12-08 RX ORDER — PANTOPRAZOLE SODIUM 40 MG/1
40 TABLET, DELAYED RELEASE ORAL
Status: DISCONTINUED | OUTPATIENT
Start: 2021-12-09 | End: 2021-12-09

## 2021-12-08 RX ORDER — BUMETANIDE 0.5 MG/1
0.5 TABLET ORAL DAILY
Refills: 0 | Status: SHIPPED | COMMUNITY
Start: 2021-12-08 | End: 2022-01-17

## 2021-12-08 NOTE — PLAN OF CARE
Pt resting comfortably in bed, no complaints. Hematology labs all showed significant decrease from yesterdays labs, MD notified, redraw of labs done. Redraw showed similar results, fecal occult blood stool sample ordered, waiting for pt to have BM.  Orthost schedule  Outcome: Progressing

## 2021-12-08 NOTE — CONSULTS
[facility] AT [facility] Hematology/Oncology Group  Initial Inpatient Consult Note    Osbaldo Mckenna Patient Status:  Inpatient    1940 MRN [id number]   Location [facility] 3W/SW Attending Shasta Jackson MD   Hosp Day # 2 PCP Nya Phipps hepatitis in late 2020 his platelet count was around . His count is currently stable in the 60s. The patient had a normal hemoglobin as recently as November 2021.   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     Dictated by (CST): Leoncio Field MD on 12/06/2021 at 7:58 AM     Finalized by (CST): Leoncio Field MD on 12/06/2021 at 8:03 AM          XR CHEST AP PORTABLE  (CPT=71045)    Result Date: 12/ count remains over 30 and no evidence of lul bleeding. Haptoglobin and other hemolysis labs are unremarkable thus there is currently no concern for hemolytic anemia.   I will have the patient follow-up in clinic in approximately 4 weeks to monitor his bl

## 2021-12-08 NOTE — PLAN OF CARE
Problem: Patient Centered Care  Goal: Patient preferences are identified and integrated in the patient's plan of care  Description: Interventions:  - What would you like us to know as we care for you?  Everything is going down hill since turning 81  - Pro lowest position  Side rails up x2  Ibed on   Frequent rounding done     Problem: CARDIOVASCULAR - ADULT  Goal: Maintains optimal cardiac output and hemodynamic stability  Description: INTERVENTIONS:  - Monitor vital signs, rhythm, and trends  - Monitor for

## 2021-12-08 NOTE — PROGRESS NOTES
[facility]  Hospitalist Progress Note     Brandi Ellison Patient Status:  Inpatient    1940  [de-identified]year old CSN [id number]   Location 306/306-A Attending Marco A Joseph MD   Hosp Day # 2 PCP Hebert Hughes MD     Assessment & Actually he feels constipated  No dizziness  No abdominal pain  No fevers      Objective:   Chief Complaint: Patient presents with:  Fall  Syncope  Nausea/Vomiting/Diarrhea    ----------------------------------  Temp:  [98 °F (36.7 °C)] 98 °F (36.7 °C)  Pu

## 2021-12-08 NOTE — CM/SW NOTE
BPCI-Advanced Medicare Program Note:  Plan of care reviewed for care coordination and discharge planning. Noted patient falls under  BPCI/Medicare program, with  for renal failure. MILLY tool was used to help determine next care setting.  Thus, [address]

## 2021-12-09 ENCOUNTER — APPOINTMENT (OUTPATIENT)
Dept: ULTRASOUND IMAGING | Facility: HOSPITAL | Age: 81
DRG: 392 | End: 2021-12-09
Attending: INTERNAL MEDICINE
Payer: MEDICARE

## 2021-12-09 ENCOUNTER — ANESTHESIA EVENT (OUTPATIENT)
Dept: ENDOSCOPY | Facility: HOSPITAL | Age: 81
DRG: 392 | End: 2021-12-09
Payer: MEDICARE

## 2021-12-09 ENCOUNTER — ANESTHESIA (OUTPATIENT)
Dept: ENDOSCOPY | Facility: HOSPITAL | Age: 81
DRG: 392 | End: 2021-12-09
Payer: MEDICARE

## 2021-12-09 VITALS
TEMPERATURE: 98 F | WEIGHT: 204.13 LBS | HEART RATE: 86 BPM | SYSTOLIC BLOOD PRESSURE: 115 MMHG | HEIGHT: 73 IN | OXYGEN SATURATION: 98 % | BODY MASS INDEX: 27.05 KG/M2 | RESPIRATION RATE: 20 BRPM | DIASTOLIC BLOOD PRESSURE: 64 MMHG

## 2021-12-09 PROCEDURE — 76705 ECHO EXAM OF ABDOMEN: CPT | Performed by: INTERNAL MEDICINE

## 2021-12-09 PROCEDURE — 0DB68ZX EXCISION OF STOMACH, VIA NATURAL OR ARTIFICIAL OPENING ENDOSCOPIC, DIAGNOSTIC: ICD-10-PCS | Performed by: INTERNAL MEDICINE

## 2021-12-09 PROCEDURE — 99239 HOSP IP/OBS DSCHRG MGMT >30: CPT | Performed by: HOSPITALIST

## 2021-12-09 RX ORDER — LIDOCAINE HYDROCHLORIDE 10 MG/ML
INJECTION, SOLUTION EPIDURAL; INFILTRATION; INTRACAUDAL; PERINEURAL AS NEEDED
Status: DISCONTINUED | OUTPATIENT
Start: 2021-12-09 | End: 2021-12-09 | Stop reason: SURG

## 2021-12-09 RX ORDER — PANTOPRAZOLE SODIUM 40 MG/1
40 TABLET, DELAYED RELEASE ORAL
Qty: 30 TABLET | Refills: 0 | Status: SHIPPED | OUTPATIENT
Start: 2021-12-10 | End: 2022-01-17

## 2021-12-09 RX ORDER — SODIUM CHLORIDE, SODIUM LACTATE, POTASSIUM CHLORIDE, CALCIUM CHLORIDE 600; 310; 30; 20 MG/100ML; MG/100ML; MG/100ML; MG/100ML
INJECTION, SOLUTION INTRAVENOUS CONTINUOUS PRN
Status: DISCONTINUED | OUTPATIENT
Start: 2021-12-09 | End: 2021-12-09 | Stop reason: SURG

## 2021-12-09 RX ADMIN — SODIUM CHLORIDE, SODIUM LACTATE, POTASSIUM CHLORIDE, CALCIUM CHLORIDE: 600; 310; 30; 20 INJECTION, SOLUTION INTRAVENOUS at 15:02:00

## 2021-12-09 RX ADMIN — SODIUM CHLORIDE, SODIUM LACTATE, POTASSIUM CHLORIDE, CALCIUM CHLORIDE: 600; 310; 30; 20 INJECTION, SOLUTION INTRAVENOUS at 14:56:00

## 2021-12-09 RX ADMIN — LIDOCAINE HYDROCHLORIDE 50 MG: 10 INJECTION, SOLUTION EPIDURAL; INFILTRATION; INTRACAUDAL; PERINEURAL at 14:58:00

## 2021-12-09 NOTE — PLAN OF CARE
Pt went for EGD today. Cleared by GI to go home today. Follow up instructions discussed with pt and wife bedside. Pt to discharge home with wife, both agreeable.      Problem: Patient Centered Care  Goal: Patient preferences are identified and integrated in INTERVENTIONS:  - Monitor vital signs, rhythm, and trends  - Monitor for bleeding, hypotension and signs of decreased cardiac output  - Evaluate effectiveness of vasoactive medications to optimize hemodynamic stability  - Monitor arterial and/or venous pun

## 2021-12-09 NOTE — ANESTHESIA PREPROCEDURE EVALUATION
Anesthesia PreOp Note    HPI:     Jana Spicer is a [de-identified]year old male who presents for preoperative consultation requested by: Kia Koehler MD    Date of Surgery: 12/6/2021 - 12/9/2021    Procedure(s):  ESOPHAGOGASTRODUODENOSCOPY (EGD)  Indicati Noted: 06/08/2020      Abnormal fasting glucose         Date Noted: 06/05/2019      Thrombocytopenia [facility])         Date Noted: 11/01/2017      Hearing loss of right ear         Date Noted: 05/25/2017      Bilateral impacted cerumen         Date Noted: 11/2 LASER SURGERY OF PROSTATE  20104    cystoscopy w/ Gilberto Richard laser ablation of prostate    • DIL URETHRA STRIC,MALE,INITIAL  04/14/2014    Dialate Urethra   • FOOT SURGERY  04/17/2014    Right Ankle Surgery       metoprolol succinate 25 MG Oral Tablet 24 Mother         CAD   • Heart Disease Sister    • Heart Disease Father         CAD + ASHD   • Renal Disease Father    • Blood Disorder Brother         blood clot in leg   • Cancer Brother         Prostate cancer in remission     Social History    [place] 12/09/2021    RDW 14.2 12/09/2021    PLT 77.0 (L) 12/09/2021     Lab Results   Component Value Date     12/08/2021    K 4.4 12/08/2021     (H) 12/08/2021    CO2 26.0 12/08/2021    BUN 21 (H) 12/08/2021    CREATSERUM 1.19 12/08/2021     ( major complications, and any alternative forms of anesthetic management. All of the patient's questions were answered to the best of my ability. The patient desires the anesthetic management as planned.   Cody Mckeon CRNA  12/9/2021 2:41 PM

## 2021-12-09 NOTE — ANESTHESIA POSTPROCEDURE EVALUATION
Patient: Glenis Marshall    Procedure Summary     Date: 12/09/21 Room / Location: [facility] ENDOSCOPY 05 / [facility] ENDOSCOPY    Anesthesia Start: 2522 Anesthesia Stop: 6687    Procedure: ESOPHAGOGASTRODUODENOSCOPY (EGD) (N/A ) Diagnosis: (mild gastritis)    Jared

## 2021-12-09 NOTE — PLAN OF CARE
Pt resting in room, no complaints. Hematology and GI consulted for further workup for abnormal drop in hematology labs. Plan is for EGD and US abdomen tomorrow morning, pt will be NPO at midnight.  Hopeful for discharge once medically able, will continue to CARDIOVASCULAR - ADULT  Goal: Maintains optimal cardiac output and hemodynamic stability  Description: INTERVENTIONS:  - Monitor vital signs, rhythm, and trends  - Monitor for bleeding, hypotension and signs of decreased cardiac output  - Evaluate effectiv

## 2021-12-09 NOTE — RESTORATIVE THERAPY
RESTORATIVE CARE TREATMENT NOTE    Presenting Problem  Presenting Problem: Syncope and collapse  Presenting Problem:  (syncope and collapse)       Precautions  Precautions: Bed/chair alarm       Weight Bearing Restriction  Weight Bearing Restriction: None

## 2021-12-09 NOTE — OPERATIVE REPORT
EGD Operative Report    Reford Ochoa Patient Status:  Inpatient    1940 MRN [id number]   [employer] ESOPHAGUS:  Normal esophagus. No signs of esophageal varices. The Z-line and GE junction noted at 41cms from the incisors and was normal. There was a 1cm sliding hiatal hernia noted.     STOMACH:  Mild gastric erythema suggestive of mild gastritis, how

## 2021-12-09 NOTE — PLAN OF CARE
Pt A&Ox4 on room air. Orthostatic BP negative. Plan for US abdomen and EGD today. Patient kept NPO since 0000. No complaints overnight. VS stable. Call light within reach. Will continue to monitor.      Problem: Patient Centered Care  Goal: Patient preferen

## 2021-12-10 ENCOUNTER — PATIENT OUTREACH (OUTPATIENT)
Dept: CASE MANAGEMENT | Age: 81
End: 2021-12-10

## 2021-12-10 ENCOUNTER — TELEPHONE (OUTPATIENT)
Dept: INTERNAL MEDICINE CLINIC | Facility: CLINIC | Age: 81
End: 2021-12-10

## 2021-12-10 DIAGNOSIS — Z02.9 ENCOUNTERS FOR UNSPECIFIED ADMINISTRATIVE PURPOSE: ICD-10-CM

## 2021-12-10 DIAGNOSIS — I50.33 ACUTE ON CHRONIC HEART FAILURE WITH PRESERVED EJECTION FRACTION (HFPEF) (HCC): Primary | ICD-10-CM

## 2021-12-10 DIAGNOSIS — I10 ESSENTIAL HYPERTENSION: ICD-10-CM

## 2021-12-10 DIAGNOSIS — N18.4 STAGE 4 CHRONIC KIDNEY DISEASE (HCC): ICD-10-CM

## 2021-12-10 PROCEDURE — 1111F DSCHRG MED/CURRENT MED MERGE: CPT

## 2021-12-10 NOTE — TELEPHONE ENCOUNTER
Spoke to the pt today for TCM. The patient was recently hospitalized for syncope/orthostatic hypotension. The pt does not have a HFU appt scheduled at this time and declined scheduling when offered by the [facility].  The patient prefers to have the spouse speak w

## 2021-12-10 NOTE — PROGRESS NOTES
Initial Post Discharge Follow Up   Discharge Date: 12/9/21  Contact Date: 12/10/2021    Consent Verification:  Assessment Completed With: Patient  HIPAA Verified? Yes    Discharge Dx:     Orthostatic hypotension.   Presumably related to GI losses  Acute ambulating to the bathroom, etc)? yes; NCM did review/stress the importance of fall precautions. • (NCM) Was patient given a different diet per AVS? no; The patient has continued a low sodium diet at home.  The patient also reports monitoring daily potass anything? yes  • Are there any reasons that keep you from taking your medication as prescribed? No   Will get pant today; jaden pozo to discuss metoprol      Referrals/orders at D/C:  Home Health/Services ordered at D/C?   No     Except for Home Health [facility], [facility] [place]  [address] [address]  [phone]          PCP TCM/HFU appointment: scheduled at D/C within 7-14 days  no     NCM Reviewed/scheduled/rescheduled PCP TCM/ medications with patient,  and orders reviewed and discussed. Any changes or updates to medications and or orders sent to PCP.

## 2021-12-13 ENCOUNTER — APPOINTMENT (OUTPATIENT)
Dept: LAB | Age: 81
End: 2021-12-13
Attending: INTERNAL MEDICINE
Payer: MEDICARE

## 2021-12-13 ENCOUNTER — LAB ENCOUNTER (OUTPATIENT)
Dept: LAB | Age: 81
End: 2021-12-13
Attending: INTERNAL MEDICINE
Payer: MEDICARE

## 2021-12-13 ENCOUNTER — OFFICE VISIT (OUTPATIENT)
Dept: INTERNAL MEDICINE CLINIC | Facility: CLINIC | Age: 81
End: 2021-12-13
Payer: MEDICARE

## 2021-12-13 VITALS
HEIGHT: 73 IN | HEART RATE: 80 BPM | BODY MASS INDEX: 27.7 KG/M2 | TEMPERATURE: 98 F | DIASTOLIC BLOOD PRESSURE: 80 MMHG | WEIGHT: 209 LBS | SYSTOLIC BLOOD PRESSURE: 120 MMHG | OXYGEN SATURATION: 95 %

## 2021-12-13 DIAGNOSIS — D69.6 THROMBOCYTOPENIA (HCC): ICD-10-CM

## 2021-12-13 DIAGNOSIS — D64.9 ANEMIA, UNSPECIFIED TYPE: Primary | ICD-10-CM

## 2021-12-13 DIAGNOSIS — R53.83 FATIGUE, UNSPECIFIED TYPE: ICD-10-CM

## 2021-12-13 DIAGNOSIS — K75.4 AUTOIMMUNE HEPATITIS (HCC): ICD-10-CM

## 2021-12-13 DIAGNOSIS — N18.30 STAGE 3 CHRONIC KIDNEY DISEASE, UNSPECIFIED WHETHER STAGE 3A OR 3B CKD (HCC): ICD-10-CM

## 2021-12-13 DIAGNOSIS — E86.1 HYPOTENSION DUE TO HYPOVOLEMIA: Primary | ICD-10-CM

## 2021-12-13 DIAGNOSIS — E03.9 HYPOTHYROIDISM, UNSPECIFIED TYPE: ICD-10-CM

## 2021-12-13 DIAGNOSIS — I95.89 HYPOTENSION DUE TO HYPOVOLEMIA: Primary | ICD-10-CM

## 2021-12-13 DIAGNOSIS — M15.9 PRIMARY OSTEOARTHRITIS INVOLVING MULTIPLE JOINTS: ICD-10-CM

## 2021-12-13 DIAGNOSIS — I10 ESSENTIAL HYPERTENSION: ICD-10-CM

## 2021-12-13 DIAGNOSIS — R73.01 ABNORMAL FASTING GLUCOSE: ICD-10-CM

## 2021-12-13 DIAGNOSIS — D64.9 ANEMIA, UNSPECIFIED TYPE: ICD-10-CM

## 2021-12-13 DIAGNOSIS — R79.89 ELEVATED TSH: ICD-10-CM

## 2021-12-13 DIAGNOSIS — I48.0 PAROXYSMAL ATRIAL FIBRILLATION (HCC): ICD-10-CM

## 2021-12-13 DIAGNOSIS — E78.00 HYPERCHOLESTEROLEMIA: ICD-10-CM

## 2021-12-13 DIAGNOSIS — I67.1 CEREBRAL ANEURYSM: ICD-10-CM

## 2021-12-13 DIAGNOSIS — R56.9 SEIZURE (HCC): ICD-10-CM

## 2021-12-13 PROCEDURE — 85025 COMPLETE CBC W/AUTO DIFF WBC: CPT

## 2021-12-13 PROCEDURE — 99214 OFFICE O/P EST MOD 30 MIN: CPT | Performed by: INTERNAL MEDICINE

## 2021-12-13 PROCEDURE — 36415 COLL VENOUS BLD VENIPUNCTURE: CPT

## 2021-12-13 PROCEDURE — 80048 BASIC METABOLIC PNL TOTAL CA: CPT

## 2021-12-13 NOTE — PATIENT INSTRUCTIONS
1.  Patient is to continue his current diet, medication and activity. 2.  I will obtain a CBC and BMP today. 3.  I will plan to see the patient back in 1 month for follow-up evaluation.   I will consider obtaining blood test prior to that visit after see

## 2021-12-13 NOTE — PROGRESS NOTES
Sharon Avelar is a [de-identified]year old male. Patient presents with:  Hospital F/U  Weakness  Anemia  Autoimmune Hepatitis  Hypertension  Atrial Fibrillation  Hyperlipidemia    HPI:   Patient presents with:  Hospital F/U  Weakness  Anemia  Autoimmune Hepatiti hyperplasia) 2009   • Diverticulosis    • Elevated liver enzymes    • Enlarged prostate 2009   • Essential hypertension    • Hearing impairment    • High blood pressure    • Left axis deviation 2002   • Prostate enlargement 03/06/2014    Surgery to reduce count were improving at the time of his discharge. I will obtain a CBC and BMP today. I will plan to see the patient back in 1 month. Patient will follow up with his gastroenterologist, Dr. Samuel Gloria, and also with his hematologist, Dr. Juan Rogers.       2. Frandy

## 2021-12-14 ENCOUNTER — TELEPHONE (OUTPATIENT)
Dept: INTERNAL MEDICINE CLINIC | Facility: CLINIC | Age: 81
End: 2021-12-14

## 2021-12-14 DIAGNOSIS — D64.9 ANEMIA, UNSPECIFIED TYPE: Primary | ICD-10-CM

## 2021-12-14 DIAGNOSIS — N18.31 STAGE 3A CHRONIC KIDNEY DISEASE (HCC): ICD-10-CM

## 2021-12-14 NOTE — PROGRESS NOTES
Blood tests discussed with patient. Autoimmune markers all negative. Please obtain the CT scan as we discussed.

## 2021-12-14 NOTE — TELEPHONE ENCOUNTER
Telephone call to pt to notify him that his CBC is improving with Hb~10.1   And platelets about 478,021. BMP has a GFR~45, BUN-28, and Creatinine-1.45. Pt is to have a repeat CBC and BMP prior to seeing me in 1 month.

## 2021-12-29 NOTE — DISCHARGE SUMMARY
[facility] HOSPITALIST  DISCHARGE SUMMARY     Carolyn Mayfield Patient Status:  Inpatient    1940 MRN [id number]   Location [facility] 3W/SW Attending No att. providers found   [facility] Day # 3 PCP Alejandrina Addison MD     Date of Admission: evidence of bleeding. He does have a history of thrombocytopenia but with a platelet count usually around 100.  Also, h/o autoimmune hepatitis  -Repeat CBC low again, however stable  -Additional work-up, B12, iron, LFT, LDH, haptoglobin  -Hematology cons MG Tb24  Commonly known as: Toprol XL      Take 1 tablet (25 mg total) by mouth nightly.    Quantity: 90 tablet  Refills: 1           Where to Get Your Medications      These medications were sent to [facility] - [address]

## 2022-01-03 DIAGNOSIS — D64.9 ANEMIA, UNSPECIFIED TYPE: Primary | ICD-10-CM

## 2022-01-13 ENCOUNTER — LAB ENCOUNTER (OUTPATIENT)
Dept: LAB | Facility: HOSPITAL | Age: 82
End: 2022-01-13
Attending: INTERNAL MEDICINE
Payer: MEDICARE

## 2022-01-13 DIAGNOSIS — D64.9 ANEMIA, UNSPECIFIED TYPE: ICD-10-CM

## 2022-01-13 DIAGNOSIS — N18.31 STAGE 3A CHRONIC KIDNEY DISEASE (HCC): ICD-10-CM

## 2022-01-13 LAB
ANION GAP SERPL CALC-SCNC: 4 MMOL/L (ref 0–18)
BASOPHILS # BLD AUTO: 0.04 X10(3) UL (ref 0–0.2)
BASOPHILS NFR BLD AUTO: 0.7 %
BUN BLD-MCNC: 25 MG/DL (ref 7–18)
BUN/CREAT SERPL: 18.9 (ref 10–20)
CALCIUM BLD-MCNC: 9 MG/DL (ref 8.5–10.1)
CHLORIDE SERPL-SCNC: 106 MMOL/L (ref 98–112)
CO2 SERPL-SCNC: 30 MMOL/L (ref 21–32)
CREAT BLD-MCNC: 1.32 MG/DL
DEPRECATED RDW RBC AUTO: 46.8 FL (ref 35.1–46.3)
EOSINOPHIL # BLD AUTO: 0.31 X10(3) UL (ref 0–0.7)
EOSINOPHIL NFR BLD AUTO: 5.4 %
ERYTHROCYTE [DISTWIDTH] IN BLOOD BY AUTOMATED COUNT: 13.6 % (ref 11–15)
FASTING STATUS PATIENT QL REPORTED: YES
GLUCOSE BLD-MCNC: 94 MG/DL (ref 70–99)
HCT VFR BLD AUTO: 40.6 %
HGB BLD-MCNC: 13.1 G/DL
IMM GRANULOCYTES # BLD AUTO: 0.01 X10(3) UL (ref 0–1)
IMM GRANULOCYTES NFR BLD: 0.2 %
LYMPHOCYTES # BLD AUTO: 1.37 X10(3) UL (ref 1–4)
LYMPHOCYTES NFR BLD AUTO: 24 %
MCH RBC QN AUTO: 30 PG (ref 26–34)
MCHC RBC AUTO-ENTMCNC: 32.3 G/DL (ref 31–37)
MCV RBC AUTO: 93.1 FL
MONOCYTES # BLD AUTO: 0.51 X10(3) UL (ref 0.1–1)
MONOCYTES NFR BLD AUTO: 8.9 %
NEUTROPHILS # BLD AUTO: 3.46 X10 (3) UL (ref 1.5–7.7)
NEUTROPHILS # BLD AUTO: 3.46 X10(3) UL (ref 1.5–7.7)
NEUTROPHILS NFR BLD AUTO: 60.8 %
OSMOLALITY SERPL CALC.SUM OF ELEC: 294 MOSM/KG (ref 275–295)
PLATELET # BLD AUTO: 114 10(3)UL (ref 150–450)
POTASSIUM SERPL-SCNC: 4.4 MMOL/L (ref 3.5–5.1)
RBC # BLD AUTO: 4.36 X10(6)UL
SODIUM SERPL-SCNC: 140 MMOL/L (ref 136–145)
WBC # BLD AUTO: 5.7 X10(3) UL (ref 4–11)

## 2022-01-13 PROCEDURE — 85025 COMPLETE CBC W/AUTO DIFF WBC: CPT

## 2022-01-13 PROCEDURE — 36415 COLL VENOUS BLD VENIPUNCTURE: CPT

## 2022-01-13 PROCEDURE — 80048 BASIC METABOLIC PNL TOTAL CA: CPT

## 2022-01-17 ENCOUNTER — OFFICE VISIT (OUTPATIENT)
Dept: INTERNAL MEDICINE CLINIC | Facility: CLINIC | Age: 82
End: 2022-01-17
Payer: MEDICARE

## 2022-01-17 VITALS
TEMPERATURE: 99 F | OXYGEN SATURATION: 98 % | HEIGHT: 73 IN | WEIGHT: 205 LBS | HEART RATE: 72 BPM | BODY MASS INDEX: 27.17 KG/M2 | SYSTOLIC BLOOD PRESSURE: 128 MMHG | DIASTOLIC BLOOD PRESSURE: 66 MMHG

## 2022-01-17 DIAGNOSIS — E03.9 HYPOTHYROIDISM, UNSPECIFIED TYPE: ICD-10-CM

## 2022-01-17 DIAGNOSIS — E78.00 HYPERCHOLESTEROLEMIA: ICD-10-CM

## 2022-01-17 DIAGNOSIS — R56.9 SEIZURE (HCC): ICD-10-CM

## 2022-01-17 DIAGNOSIS — D69.6 THROMBOCYTOPENIA (HCC): ICD-10-CM

## 2022-01-17 DIAGNOSIS — R79.89 ELEVATED TSH: ICD-10-CM

## 2022-01-17 DIAGNOSIS — M15.9 PRIMARY OSTEOARTHRITIS INVOLVING MULTIPLE JOINTS: ICD-10-CM

## 2022-01-17 DIAGNOSIS — R73.01 ABNORMAL FASTING GLUCOSE: ICD-10-CM

## 2022-01-17 DIAGNOSIS — R21 RASH: ICD-10-CM

## 2022-01-17 DIAGNOSIS — R53.83 FATIGUE, UNSPECIFIED TYPE: ICD-10-CM

## 2022-01-17 DIAGNOSIS — K75.4 AUTOIMMUNE HEPATITIS (HCC): ICD-10-CM

## 2022-01-17 DIAGNOSIS — N18.31 STAGE 3A CHRONIC KIDNEY DISEASE (HCC): ICD-10-CM

## 2022-01-17 DIAGNOSIS — D64.9 ANEMIA, UNSPECIFIED TYPE: ICD-10-CM

## 2022-01-17 DIAGNOSIS — I67.1 CEREBRAL ANEURYSM: ICD-10-CM

## 2022-01-17 DIAGNOSIS — I10 ESSENTIAL HYPERTENSION: Primary | ICD-10-CM

## 2022-01-17 DIAGNOSIS — I48.0 PAROXYSMAL ATRIAL FIBRILLATION (HCC): ICD-10-CM

## 2022-01-17 PROCEDURE — 99214 OFFICE O/P EST MOD 30 MIN: CPT | Performed by: INTERNAL MEDICINE

## 2022-01-17 RX ORDER — FLUOCINONIDE 0.5 MG/G
OINTMENT TOPICAL
Qty: 30 G | Refills: 1 | Status: SHIPPED | OUTPATIENT
Start: 2022-01-17

## 2022-01-17 NOTE — PATIENT INSTRUCTIONS
1. Patient is to continue his current diet, medication and activity. 2. Patient has received his COVID vaccines, his COVID booster vaccine and his flu vaccine. 3. Patient may use Lidex ointment once or twice a day on the rash on his back.   4. After the p

## 2022-01-17 NOTE — PROGRESS NOTES
Ankit Sabillon is a [de-identified]year old male. Patient presents with: Follow - Up: 1 month f/u. Had an appt with Dr. Quentin Reese 1/3/22. colonoscopy suggested. TBD.  appt with Dr. Rosa Almazan tomorrow  Weakness  Anemia  Autoimmune Hepatitis  Hypertension  Atrial Fibrillat Use      Smoking status: Former Smoker        Packs/day: 0.00        Years: 0.00        Pack years: 0        Types: Pipe        Quit date: 6/15/1969        Years since quittin.6      Smokeless tobacco: Never Used    Vaping Use      Vaping Use: Never u CPM.    4. Anemia, unspecified type  Patient's recent CBC is improved. Patient's hemoglobin is 13.1, hematocrit 40.6 and WBC is 5700. Patient's platelet count is 665,248. Patient appears to be doing well.   I we will see the patient back in 6 weeks with

## 2022-01-18 ENCOUNTER — NURSE ONLY (OUTPATIENT)
Dept: HEMATOLOGY/ONCOLOGY | Facility: HOSPITAL | Age: 82
End: 2022-01-18
Attending: STUDENT IN AN ORGANIZED HEALTH CARE EDUCATION/TRAINING PROGRAM
Payer: MEDICARE

## 2022-01-18 VITALS
DIASTOLIC BLOOD PRESSURE: 72 MMHG | RESPIRATION RATE: 16 BRPM | TEMPERATURE: 98 F | HEART RATE: 66 BPM | SYSTOLIC BLOOD PRESSURE: 130 MMHG | OXYGEN SATURATION: 99 %

## 2022-01-18 DIAGNOSIS — D64.9 ANEMIA, UNSPECIFIED TYPE: ICD-10-CM

## 2022-01-18 DIAGNOSIS — D69.6 THROMBOCYTOPENIA (HCC): ICD-10-CM

## 2022-01-18 DIAGNOSIS — D64.9 ANEMIA, UNSPECIFIED TYPE: Primary | ICD-10-CM

## 2022-01-18 LAB
ALBUMIN SERPL-MCNC: 3.4 G/DL (ref 3.4–5)
ALBUMIN/GLOB SERPL: 1 {RATIO} (ref 1–2)
ALP LIVER SERPL-CCNC: 90 U/L
ALT SERPL-CCNC: 25 U/L
ANION GAP SERPL CALC-SCNC: 4 MMOL/L (ref 0–18)
AST SERPL-CCNC: 18 U/L (ref 15–37)
BASOPHILS # BLD AUTO: 0.05 X10(3) UL (ref 0–0.2)
BASOPHILS NFR BLD AUTO: 0.9 %
BILIRUB SERPL-MCNC: 0.8 MG/DL (ref 0.1–2)
BUN BLD-MCNC: 25 MG/DL (ref 7–18)
BUN/CREAT SERPL: 20.2 (ref 10–20)
CALCIUM BLD-MCNC: 8.9 MG/DL (ref 8.5–10.1)
CHLORIDE SERPL-SCNC: 105 MMOL/L (ref 98–112)
CO2 SERPL-SCNC: 30 MMOL/L (ref 21–32)
CREAT BLD-MCNC: 1.24 MG/DL
DEPRECATED HBV CORE AB SER IA-ACNC: 204.9 NG/ML
DEPRECATED RDW RBC AUTO: 46.4 FL (ref 35.1–46.3)
EOSINOPHIL # BLD AUTO: 0.24 X10(3) UL (ref 0–0.7)
EOSINOPHIL NFR BLD AUTO: 4.3 %
ERYTHROCYTE [DISTWIDTH] IN BLOOD BY AUTOMATED COUNT: 13.5 % (ref 11–15)
GLOBULIN PLAS-MCNC: 3.3 G/DL (ref 2.8–4.4)
GLUCOSE BLD-MCNC: 106 MG/DL (ref 70–99)
HCT VFR BLD AUTO: 40.8 %
HGB BLD-MCNC: 13.2 G/DL
IMM GRANULOCYTES # BLD AUTO: 0.01 X10(3) UL (ref 0–1)
IMM GRANULOCYTES NFR BLD: 0.2 %
IRON SATN MFR SERPL: 37 %
IRON SERPL-MCNC: 96 UG/DL
LYMPHOCYTES # BLD AUTO: 1.44 X10(3) UL (ref 1–4)
LYMPHOCYTES NFR BLD AUTO: 25.9 %
MCH RBC QN AUTO: 30.1 PG (ref 26–34)
MCHC RBC AUTO-ENTMCNC: 32.4 G/DL (ref 31–37)
MCV RBC AUTO: 92.9 FL
MONOCYTES # BLD AUTO: 0.54 X10(3) UL (ref 0.1–1)
MONOCYTES NFR BLD AUTO: 9.7 %
NEUTROPHILS # BLD AUTO: 3.27 X10 (3) UL (ref 1.5–7.7)
NEUTROPHILS # BLD AUTO: 3.27 X10(3) UL (ref 1.5–7.7)
NEUTROPHILS NFR BLD AUTO: 59 %
OSMOLALITY SERPL CALC.SUM OF ELEC: 293 MOSM/KG (ref 275–295)
PLATELET # BLD AUTO: 120 10(3)UL (ref 150–450)
POTASSIUM SERPL-SCNC: 4.3 MMOL/L (ref 3.5–5.1)
PROT SERPL-MCNC: 6.7 G/DL (ref 6.4–8.2)
RBC # BLD AUTO: 4.39 X10(6)UL
SODIUM SERPL-SCNC: 139 MMOL/L (ref 136–145)
TIBC SERPL-MCNC: 261 UG/DL (ref 240–450)
TRANSFERRIN SERPL-MCNC: 175 MG/DL (ref 200–360)
WBC # BLD AUTO: 5.6 X10(3) UL (ref 4–11)

## 2022-01-18 PROCEDURE — 36415 COLL VENOUS BLD VENIPUNCTURE: CPT

## 2022-01-18 PROCEDURE — 80053 COMPREHEN METABOLIC PANEL: CPT

## 2022-01-18 PROCEDURE — 82728 ASSAY OF FERRITIN: CPT

## 2022-01-18 PROCEDURE — 84466 ASSAY OF TRANSFERRIN: CPT

## 2022-01-18 PROCEDURE — 85025 COMPLETE CBC W/AUTO DIFF WBC: CPT

## 2022-01-18 PROCEDURE — 99213 OFFICE O/P EST LOW 20 MIN: CPT | Performed by: STUDENT IN AN ORGANIZED HEALTH CARE EDUCATION/TRAINING PROGRAM

## 2022-01-18 PROCEDURE — 83540 ASSAY OF IRON: CPT

## 2022-01-18 NOTE — PROGRESS NOTES
2750 Count includes the [facility]  Oncology Progress Note    Patient Name: Derrell Birch   YOB: 1940   Medical Record Number: [id number]    Subjective:   Derrell Birch is a [de-identified]year old male with multiple comorbidities, including a Procedure Laterality Date   • CONTACT LASER SURGERY OF PROSTATE  20104    cystoscopy w/ Lashae Copping laser ablation of prostate    • DIL URETHRA STRIC,MALE,INITIAL  04/14/2014    Dialate Urethra   • FOOT SURGERY  04/17/2014    Right Ankle Surgery   • OTHE Rarely      Drug use: No      Sexual activity: Not on file    Other Topics      Concerns:         Service: Not Asked        Blood Transfusions: Not Asked        Caffeine Concern: Yes          Coffee 3-4 cups daily;  Soda 1 can daily        Occupatio (H) 12/08/2021    MG 2.2 10/19/2020    PHOS 3.4 02/11/2021    TROP <0.045 12/06/2021    CK 44 10/13/2020    B12 385 12/08/2021     Imaging:  CT a/p 12/17/21:   IMPRESSION:   1. Cirrhotic morphology of the liver.    2. Perisplenic fluid collection may be a c follow-up, but he is welcome back at any time should the need arise. 22 minutes were spent in patient discussion, coordination of care, record review, lab review, imaging review.      Benson Serna, [address] Hematology/Oncology Group  Abdi Hinojosa

## 2022-02-14 ENCOUNTER — TELEPHONE (OUTPATIENT)
Dept: INTERNAL MEDICINE CLINIC | Facility: CLINIC | Age: 82
End: 2022-02-14

## 2022-02-14 NOTE — TELEPHONE ENCOUNTER
Office called to r/s patient appt on 3/3/22 for 6 week follow up   No appointments available through the end of March  Can pt be added to Dr Haider Steele schedule on a MD approval spot?   Tasked to Dr Haider Steele

## 2022-02-16 NOTE — TELEPHONE ENCOUNTER
Patient was called and added to Dr Nugent Cluster schedule on 3/11/2022 at 1400. Patient did not answer. A message was left for patient to call back and confirm appointment date and time. Awaiting patient's call back.

## 2022-02-16 NOTE — TELEPHONE ENCOUNTER
NOted.  I have reviewed my schedule. OK to add to my schedule on March 11 at 2:00. I will route this to  to schedule and notify pt.   Thank you!!

## 2022-03-03 ENCOUNTER — LAB ENCOUNTER (OUTPATIENT)
Dept: LAB | Facility: HOSPITAL | Age: 82
End: 2022-03-03
Attending: INTERNAL MEDICINE
Payer: MEDICARE

## 2022-03-03 DIAGNOSIS — R73.01 ABNORMAL FASTING GLUCOSE: ICD-10-CM

## 2022-03-03 DIAGNOSIS — R53.83 FATIGUE, UNSPECIFIED TYPE: ICD-10-CM

## 2022-03-03 DIAGNOSIS — D64.9 ANEMIA, UNSPECIFIED TYPE: ICD-10-CM

## 2022-03-03 LAB
ANION GAP SERPL CALC-SCNC: 4 MMOL/L (ref 0–18)
BASOPHILS # BLD AUTO: 0.03 X10(3) UL (ref 0–0.2)
BASOPHILS NFR BLD AUTO: 0.6 %
BUN BLD-MCNC: 25 MG/DL (ref 7–18)
BUN/CREAT SERPL: 19.1 (ref 10–20)
CALCIUM BLD-MCNC: 9.3 MG/DL (ref 8.5–10.1)
CHLORIDE SERPL-SCNC: 107 MMOL/L (ref 98–112)
CO2 SERPL-SCNC: 29 MMOL/L (ref 21–32)
CREAT BLD-MCNC: 1.31 MG/DL
DEPRECATED RDW RBC AUTO: 45.3 FL (ref 35.1–46.3)
EOSINOPHIL # BLD AUTO: 0.21 X10(3) UL (ref 0–0.7)
EOSINOPHIL NFR BLD AUTO: 4.4 %
ERYTHROCYTE [DISTWIDTH] IN BLOOD BY AUTOMATED COUNT: 13.4 % (ref 11–15)
FASTING STATUS PATIENT QL REPORTED: YES
GLUCOSE BLD-MCNC: 92 MG/DL (ref 70–99)
HCT VFR BLD AUTO: 43 %
HGB BLD-MCNC: 14.1 G/DL
IMM GRANULOCYTES # BLD AUTO: 0.01 X10(3) UL (ref 0–1)
IMM GRANULOCYTES NFR BLD: 0.2 %
LYMPHOCYTES # BLD AUTO: 1.26 X10(3) UL (ref 1–4)
LYMPHOCYTES NFR BLD AUTO: 26.7 %
MCH RBC QN AUTO: 30.1 PG (ref 26–34)
MCHC RBC AUTO-ENTMCNC: 32.8 G/DL (ref 31–37)
MCV RBC AUTO: 91.9 FL
MONOCYTES # BLD AUTO: 0.49 X10(3) UL (ref 0.1–1)
MONOCYTES NFR BLD AUTO: 10.4 %
NEUTROPHILS # BLD AUTO: 2.72 X10 (3) UL (ref 1.5–7.7)
NEUTROPHILS # BLD AUTO: 2.72 X10(3) UL (ref 1.5–7.7)
NEUTROPHILS NFR BLD AUTO: 57.7 %
OSMOLALITY SERPL CALC.SUM OF ELEC: 294 MOSM/KG (ref 275–295)
PLATELET # BLD AUTO: 106 10(3)UL (ref 150–450)
POTASSIUM SERPL-SCNC: 4.7 MMOL/L (ref 3.5–5.1)
RBC # BLD AUTO: 4.68 X10(6)UL
SODIUM SERPL-SCNC: 140 MMOL/L (ref 136–145)
WBC # BLD AUTO: 4.7 X10(3) UL (ref 4–11)

## 2022-03-03 PROCEDURE — 80048 BASIC METABOLIC PNL TOTAL CA: CPT

## 2022-03-03 PROCEDURE — 36415 COLL VENOUS BLD VENIPUNCTURE: CPT

## 2022-03-03 PROCEDURE — 85025 COMPLETE CBC W/AUTO DIFF WBC: CPT

## 2022-03-11 ENCOUNTER — OFFICE VISIT (OUTPATIENT)
Dept: INTERNAL MEDICINE CLINIC | Facility: CLINIC | Age: 82
End: 2022-03-11
Payer: MEDICARE

## 2022-03-11 VITALS
OXYGEN SATURATION: 99 % | HEIGHT: 73 IN | BODY MASS INDEX: 27.04 KG/M2 | DIASTOLIC BLOOD PRESSURE: 70 MMHG | SYSTOLIC BLOOD PRESSURE: 136 MMHG | TEMPERATURE: 98 F | WEIGHT: 204 LBS | HEART RATE: 72 BPM

## 2022-03-11 DIAGNOSIS — R79.89 ELEVATED TSH: ICD-10-CM

## 2022-03-11 DIAGNOSIS — R21 RASH: ICD-10-CM

## 2022-03-11 DIAGNOSIS — M15.9 PRIMARY OSTEOARTHRITIS INVOLVING MULTIPLE JOINTS: ICD-10-CM

## 2022-03-11 DIAGNOSIS — I67.1 CEREBRAL ANEURYSM: ICD-10-CM

## 2022-03-11 DIAGNOSIS — I10 ESSENTIAL HYPERTENSION: Primary | ICD-10-CM

## 2022-03-11 DIAGNOSIS — Z00.00 ANNUAL PHYSICAL EXAM: ICD-10-CM

## 2022-03-11 DIAGNOSIS — N18.31 STAGE 3A CHRONIC KIDNEY DISEASE (HCC): ICD-10-CM

## 2022-03-11 DIAGNOSIS — E03.9 HYPOTHYROIDISM, UNSPECIFIED TYPE: ICD-10-CM

## 2022-03-11 DIAGNOSIS — K75.4 AUTOIMMUNE HEPATITIS (HCC): ICD-10-CM

## 2022-03-11 DIAGNOSIS — E78.00 HYPERCHOLESTEROLEMIA: ICD-10-CM

## 2022-03-11 DIAGNOSIS — I48.0 PAROXYSMAL ATRIAL FIBRILLATION (HCC): ICD-10-CM

## 2022-03-11 DIAGNOSIS — R73.01 ABNORMAL FASTING GLUCOSE: ICD-10-CM

## 2022-03-11 DIAGNOSIS — D64.9 ANEMIA, UNSPECIFIED TYPE: ICD-10-CM

## 2022-03-11 DIAGNOSIS — R53.83 FATIGUE, UNSPECIFIED TYPE: ICD-10-CM

## 2022-03-11 DIAGNOSIS — R56.9 SEIZURE (HCC): ICD-10-CM

## 2022-03-11 DIAGNOSIS — D69.6 THROMBOCYTOPENIA (HCC): ICD-10-CM

## 2022-03-11 PROCEDURE — 99214 OFFICE O/P EST MOD 30 MIN: CPT | Performed by: INTERNAL MEDICINE

## 2022-03-11 NOTE — PATIENT INSTRUCTIONS
1.  Patient is to continue his current diet, medication and activity. 2.  Patient has received his COVID booster vaccine and his flu vaccine. 3.  I will plan to see the patient back in 3 months with blood tests, urinalysis and EKG for his annual physical examination. 4.  I will see the patient back sooner as necessary.

## 2022-03-14 ENCOUNTER — LAB ENCOUNTER (OUTPATIENT)
Dept: LAB | Facility: HOSPITAL | Age: 82
End: 2022-03-14
Attending: INTERNAL MEDICINE
Payer: MEDICARE

## 2022-03-14 DIAGNOSIS — Z01.818 PRE-OP TESTING: ICD-10-CM

## 2022-03-15 LAB — SARS-COV-2 RNA RESP QL NAA+PROBE: NOT DETECTED

## 2022-03-17 ENCOUNTER — ANESTHESIA (OUTPATIENT)
Dept: ENDOSCOPY | Facility: HOSPITAL | Age: 82
End: 2022-03-17
Payer: MEDICARE

## 2022-03-17 ENCOUNTER — HOSPITAL ENCOUNTER (OUTPATIENT)
Facility: HOSPITAL | Age: 82
Setting detail: HOSPITAL OUTPATIENT SURGERY
Discharge: HOME | End: 2022-03-17
Attending: INTERNAL MEDICINE | Admitting: INTERNAL MEDICINE
Payer: MEDICARE

## 2022-03-17 ENCOUNTER — ANESTHESIA EVENT (OUTPATIENT)
Dept: ENDOSCOPY | Facility: HOSPITAL | Age: 82
End: 2022-03-17
Payer: MEDICARE

## 2022-03-17 VITALS
BODY MASS INDEX: 25.31 KG/M2 | DIASTOLIC BLOOD PRESSURE: 63 MMHG | RESPIRATION RATE: 13 BRPM | SYSTOLIC BLOOD PRESSURE: 104 MMHG | OXYGEN SATURATION: 96 % | HEIGHT: 73 IN | HEART RATE: 67 BPM | WEIGHT: 191 LBS

## 2022-03-17 DIAGNOSIS — K57.90 DIVERTICULOSIS: ICD-10-CM

## 2022-03-17 DIAGNOSIS — K64.9 HEMORRHOIDS: ICD-10-CM

## 2022-03-17 DIAGNOSIS — D50.9 IRON DEFICIENCY ANEMIA, UNSPECIFIED IRON DEFICIENCY ANEMIA TYPE: ICD-10-CM

## 2022-03-17 DIAGNOSIS — Z01.818 PRE-OP TESTING: Primary | ICD-10-CM

## 2022-03-17 DIAGNOSIS — Z01.818 PREOP TESTING: ICD-10-CM

## 2022-03-17 DIAGNOSIS — K63.5 COLON POLYPS: ICD-10-CM

## 2022-03-17 PROCEDURE — 0DBL8ZX EXCISION OF TRANSVERSE COLON, VIA NATURAL OR ARTIFICIAL OPENING ENDOSCOPIC, DIAGNOSTIC: ICD-10-PCS | Performed by: INTERNAL MEDICINE

## 2022-03-17 PROCEDURE — 88305 TISSUE EXAM BY PATHOLOGIST: CPT | Performed by: INTERNAL MEDICINE

## 2022-03-17 PROCEDURE — 0DBK8ZX EXCISION OF ASCENDING COLON, VIA NATURAL OR ARTIFICIAL OPENING ENDOSCOPIC, DIAGNOSTIC: ICD-10-PCS | Performed by: INTERNAL MEDICINE

## 2022-03-17 PROCEDURE — 36415 COLL VENOUS BLD VENIPUNCTURE: CPT | Performed by: INTERNAL MEDICINE

## 2022-03-17 RX ORDER — SODIUM CHLORIDE, SODIUM LACTATE, POTASSIUM CHLORIDE, CALCIUM CHLORIDE 600; 310; 30; 20 MG/100ML; MG/100ML; MG/100ML; MG/100ML
INJECTION, SOLUTION INTRAVENOUS CONTINUOUS
Status: DISCONTINUED | OUTPATIENT
Start: 2022-03-17 | End: 2022-03-17

## 2022-03-17 RX ORDER — LIDOCAINE HYDROCHLORIDE 10 MG/ML
INJECTION, SOLUTION EPIDURAL; INFILTRATION; INTRACAUDAL; PERINEURAL AS NEEDED
Status: DISCONTINUED | OUTPATIENT
Start: 2022-03-17 | End: 2022-03-17 | Stop reason: SURG

## 2022-03-17 RX ORDER — SODIUM CHLORIDE, SODIUM LACTATE, POTASSIUM CHLORIDE, CALCIUM CHLORIDE 600; 310; 30; 20 MG/100ML; MG/100ML; MG/100ML; MG/100ML
INJECTION, SOLUTION INTRAVENOUS CONTINUOUS
OUTPATIENT
Start: 2022-03-17

## 2022-03-17 RX ORDER — NALOXONE HYDROCHLORIDE 0.4 MG/ML
80 INJECTION, SOLUTION INTRAMUSCULAR; INTRAVENOUS; SUBCUTANEOUS AS NEEDED
OUTPATIENT
Start: 2022-03-17 | End: 2022-03-17

## 2022-03-17 RX ADMIN — LIDOCAINE HYDROCHLORIDE 50 MG: 10 INJECTION, SOLUTION EPIDURAL; INFILTRATION; INTRACAUDAL; PERINEURAL at 13:46:00

## 2022-03-17 RX ADMIN — SODIUM CHLORIDE, SODIUM LACTATE, POTASSIUM CHLORIDE, CALCIUM CHLORIDE: 600; 310; 30; 20 INJECTION, SOLUTION INTRAVENOUS at 13:41:00

## 2022-03-17 RX ADMIN — SODIUM CHLORIDE, SODIUM LACTATE, POTASSIUM CHLORIDE, CALCIUM CHLORIDE: 600; 310; 30; 20 INJECTION, SOLUTION INTRAVENOUS at 14:10:00

## 2022-03-17 NOTE — ANESTHESIA POSTPROCEDURE EVALUATION
Patient: Maurilio Lindsay    Procedure Summary     Date: 03/17/22 Room / Location: [address] [place] [address] ENDOSCOPY 05 / [address] [place] [address] ENDOSCOPY    Anesthesia Start: 1595 Anesthesia Stop:     Procedure: COLONOSCOPY (N/A ) Diagnosis:       Iron deficiency anemia, unspecified iron deficiency anemia type      Preop testing      (diverticulosis; polyps; hemorrhoids)    Surgeons: Aranza Vu MD Anesthesiologist: Mesfin Patel CRNA    Anesthesia Type: MAC ASA Status: 2          Anesthesia Type: MAC    Vitals Value Taken Time   /51 03/17/22 1410   Temp N/A 03/17/22 1411   Pulse 72 03/17/22 1410   Resp 12 03/17/22 1410   SpO2 96 % 03/17/22 1410       [facility] AN Post Evaluation:   Patient Evaluated in PACU  Patient Participation: complete - patient participated  Level of Consciousness: sleepy but conscious  Pain Management: adequate  Airway Patency:patent  Yes    Cardiovascular Status: acceptable  Respiratory Status: acceptable and room air  Postoperative Hydration acceptable      Cassy Oquendo CRNA  3/17/2022 2:11 PM

## 2022-03-17 NOTE — OPERATIVE REPORT
Colonoscopy Operative Report    Elvis Guajardo Patient Status:  [facility]    1940 MRN [id number]   Location [facility] ENDOSCOPY LAB SUITES Attending Jacque Roque MD    Day #   0 PCP Sergey Lyman MD     Pre-Operative Diagnosis: Iron deficiency anemia, unspecified iron deficiency anemia type, Preop testing    Post-Operative Diagnosis:  Polyps x 6  Severe Diverticulosis  Grade 3 Internal Hemorrhoids    Procedure Performed: COLONOSCOPY w/Biopsy    Informed Consent: Informed consent for both the procedure and sedation were obtained from the patient. The potentially life-threatening complications of sedation, bleeding,  perforation, transfusion or repeat endoscopy  were reviewed along with the possible need for hospitalization, surgical management, transfusion or repeat endoscopy should one of these complications arise. The patient understands and is agreeable to proceed. Sedation Type: MAC-Patient received sedation with monitored anesthesia provided by an anesthesiologist  Moderate Sedation Time: NA  A trained sedation nurse was present to assist in monitoring the patient during the entire length of moderate sedation time. Cecum Withdrawal Time:  10 Minutes  Date of previous colonoscopy:     Procedure Description: The patient was placed in the left lateral decubitus position. After careful digital rectal examination, the Adult colonoscope was inserted into the rectum and advanced to the level of the cecum under direct visualization. The cecum was identified by landmarks, including the appendiceal orifice and ileoceccal valve. Careful examination of the entire colon was performed during withdrawal of the endoscope. The scope was withdrawn to the rectum and retroflexion was performed. The patient tolerated the procedure well with no immediate complications.  The patient was transferred to the recovery area in stable condition. Quality of Preparation: Adequate  [place] Bowel Prep Scale:  2  [place] Bowel Prep Score:  7 (2, 2, 3)    Findings:   Terminal Ileum:  Normal appearing terminal ileal mucosa. Colon:    - 4 sessile polyps measuring 1 mm were found in the ascending colon s/p removal with cold forceps. - 2 sessile polyps measuring 1 and 2 mm were found in the transverse colon s/p removal with cold forceps. - Severe diverticulosis in the descending and sigmoid colon. Rectum:  Grade 3 internal hemorrhoids seen on retroflexion. Normal manual rectal exam.      Recommendations:   - Follow up pathology results  - Repeat Colonoscopy not recommended based on age and given diminutive polyps  - Suspect anemia on prior admission due to subscapular hematoma of spleen    Discharge: The patient was given an after visit summary detailing the procedure, findings, recommendations and follow up plans.      Joe Cardenas MD  3/17/2022  2:12 PM

## 2022-03-21 NOTE — PROGRESS NOTES
3/21/2022  [address]  [address] [id number]    Dear Ruba Maciel,       Here are the biopsy/pathology findings from your recent Colonoscopy : There were 6 small adenomatous polyp(s), which is a benign potentially pre-cancerous growth that were removed. These types of polyps can re-occur. Follow-up information:    - Repeat Colonoscopy not recommended based on age and given diminutive polyps        If you need any further assistance, please feel free to call [phone]. Thank you for letting us care for you.       MD Yin [facility]  [phone]

## 2022-03-28 NOTE — TELEPHONE ENCOUNTER
Last seen 11/16/21, follow up scheduled 5/17. Labs in epic per PCP.   Please advise on refill request.

## 2022-03-29 RX ORDER — METOPROLOL SUCCINATE 25 MG/1
25 TABLET, EXTENDED RELEASE ORAL NIGHTLY
Qty: 90 TABLET | Refills: 1 | Status: SHIPPED | OUTPATIENT
Start: 2022-03-29

## 2022-05-03 ENCOUNTER — PATIENT OUTREACH (OUTPATIENT)
Dept: CASE MANAGEMENT | Age: 82
End: 2022-05-03

## 2022-05-13 ENCOUNTER — LAB ENCOUNTER (OUTPATIENT)
Dept: LAB | Age: 82
End: 2022-05-13
Attending: INTERNAL MEDICINE
Payer: MEDICARE

## 2022-05-13 DIAGNOSIS — N18.30 STAGE 3 CHRONIC KIDNEY DISEASE, UNSPECIFIED WHETHER STAGE 3A OR 3B CKD (HCC): ICD-10-CM

## 2022-05-13 LAB
ANION GAP SERPL CALC-SCNC: 4 MMOL/L (ref 0–18)
BILIRUB UR QL: NEGATIVE
BUN BLD-MCNC: 24 MG/DL (ref 7–18)
BUN/CREAT SERPL: 17.5 (ref 10–20)
CALCIUM BLD-MCNC: 8.9 MG/DL (ref 8.5–10.1)
CHLORIDE SERPL-SCNC: 106 MMOL/L (ref 98–112)
CLARITY UR: CLEAR
CO2 SERPL-SCNC: 28 MMOL/L (ref 21–32)
COLOR UR: YELLOW
CREAT BLD-MCNC: 1.37 MG/DL
FASTING STATUS PATIENT QL REPORTED: NO
GLUCOSE BLD-MCNC: 156 MG/DL (ref 70–99)
GLUCOSE UR-MCNC: NEGATIVE MG/DL
HGB UR QL STRIP.AUTO: NEGATIVE
KETONES UR-MCNC: NEGATIVE MG/DL
LEUKOCYTE ESTERASE UR QL STRIP.AUTO: NEGATIVE
NITRITE UR QL STRIP.AUTO: NEGATIVE
OSMOLALITY SERPL CALC.SUM OF ELEC: 293 MOSM/KG (ref 275–295)
PH UR: 6 [PH] (ref 5–8)
POTASSIUM SERPL-SCNC: 4.1 MMOL/L (ref 3.5–5.1)
PROT UR-MCNC: NEGATIVE MG/DL
SODIUM SERPL-SCNC: 138 MMOL/L (ref 136–145)
SP GR UR STRIP: 1.01 (ref 1–1.03)
UROBILINOGEN UR STRIP-ACNC: <2
VIT C UR-MCNC: NEGATIVE MG/DL

## 2022-05-13 PROCEDURE — 80048 BASIC METABOLIC PNL TOTAL CA: CPT

## 2022-05-13 PROCEDURE — 36415 COLL VENOUS BLD VENIPUNCTURE: CPT

## 2022-05-13 PROCEDURE — 81003 URINALYSIS AUTO W/O SCOPE: CPT

## 2022-05-17 ENCOUNTER — OFFICE VISIT (OUTPATIENT)
Dept: NEPHROLOGY | Facility: CLINIC | Age: 82
End: 2022-05-17
Payer: MEDICARE

## 2022-05-17 VITALS
WEIGHT: 209.25 LBS | DIASTOLIC BLOOD PRESSURE: 64 MMHG | SYSTOLIC BLOOD PRESSURE: 131 MMHG | BODY MASS INDEX: 28 KG/M2 | HEART RATE: 65 BPM

## 2022-05-17 DIAGNOSIS — N18.30 STAGE 3 CHRONIC KIDNEY DISEASE, UNSPECIFIED WHETHER STAGE 3A OR 3B CKD (HCC): Primary | ICD-10-CM

## 2022-05-17 PROCEDURE — 99214 OFFICE O/P EST MOD 30 MIN: CPT | Performed by: INTERNAL MEDICINE

## 2022-06-16 ENCOUNTER — LAB ENCOUNTER (OUTPATIENT)
Dept: LAB | Age: 82
End: 2022-06-16
Attending: INTERNAL MEDICINE
Payer: MEDICARE

## 2022-06-16 DIAGNOSIS — E78.00 HYPERCHOLESTEROLEMIA: ICD-10-CM

## 2022-06-16 DIAGNOSIS — Z00.00 ANNUAL PHYSICAL EXAM: ICD-10-CM

## 2022-06-16 DIAGNOSIS — R53.83 FATIGUE, UNSPECIFIED TYPE: ICD-10-CM

## 2022-06-16 DIAGNOSIS — R73.01 ABNORMAL FASTING GLUCOSE: ICD-10-CM

## 2022-06-16 DIAGNOSIS — D69.6 THROMBOCYTOPENIA (HCC): ICD-10-CM

## 2022-06-16 DIAGNOSIS — E03.9 HYPOTHYROIDISM, UNSPECIFIED TYPE: ICD-10-CM

## 2022-06-16 DIAGNOSIS — D64.9 ANEMIA, UNSPECIFIED TYPE: ICD-10-CM

## 2022-06-16 LAB
ALBUMIN SERPL-MCNC: 3.6 G/DL (ref 3.4–5)
ALBUMIN/GLOB SERPL: 1.2 {RATIO} (ref 1–2)
ALP LIVER SERPL-CCNC: 87 U/L
ALT SERPL-CCNC: 21 U/L
ANION GAP SERPL CALC-SCNC: 6 MMOL/L (ref 0–18)
AST SERPL-CCNC: 19 U/L (ref 15–37)
BASOPHILS # BLD AUTO: 0.03 X10(3) UL (ref 0–0.2)
BASOPHILS NFR BLD AUTO: 0.5 %
BILIRUB SERPL-MCNC: 1.2 MG/DL (ref 0.1–2)
BILIRUB UR QL: NEGATIVE
BUN BLD-MCNC: 27 MG/DL (ref 7–18)
BUN/CREAT SERPL: 21.1 (ref 10–20)
CALCIUM BLD-MCNC: 9.6 MG/DL (ref 8.5–10.1)
CHLORIDE SERPL-SCNC: 107 MMOL/L (ref 98–112)
CHOLEST SERPL-MCNC: 144 MG/DL (ref ?–200)
CLARITY UR: CLEAR
CO2 SERPL-SCNC: 25 MMOL/L (ref 21–32)
COLOR UR: YELLOW
CREAT BLD-MCNC: 1.28 MG/DL
DEPRECATED RDW RBC AUTO: 45.1 FL (ref 35.1–46.3)
EOSINOPHIL # BLD AUTO: 0.19 X10(3) UL (ref 0–0.7)
EOSINOPHIL NFR BLD AUTO: 3 %
ERYTHROCYTE [DISTWIDTH] IN BLOOD BY AUTOMATED COUNT: 13.3 % (ref 11–15)
FASTING PATIENT LIPID ANSWER: YES
FASTING STATUS PATIENT QL REPORTED: YES
GLOBULIN PLAS-MCNC: 2.9 G/DL (ref 2.8–4.4)
GLUCOSE BLD-MCNC: 100 MG/DL (ref 70–99)
GLUCOSE UR-MCNC: NEGATIVE MG/DL
HCT VFR BLD AUTO: 41.9 %
HDLC SERPL-MCNC: 60 MG/DL (ref 40–59)
HGB BLD-MCNC: 13.8 G/DL
IMM GRANULOCYTES # BLD AUTO: 0.03 X10(3) UL (ref 0–1)
IMM GRANULOCYTES NFR BLD: 0.5 %
KETONES UR-MCNC: NEGATIVE MG/DL
LDLC SERPL CALC-MCNC: 73 MG/DL (ref ?–100)
LEUKOCYTE ESTERASE UR QL STRIP.AUTO: NEGATIVE
LYMPHOCYTES # BLD AUTO: 1.32 X10(3) UL (ref 1–4)
LYMPHOCYTES NFR BLD AUTO: 20.7 %
MCH RBC QN AUTO: 30.3 PG (ref 26–34)
MCHC RBC AUTO-ENTMCNC: 32.9 G/DL (ref 31–37)
MCV RBC AUTO: 91.9 FL
MONOCYTES # BLD AUTO: 0.58 X10(3) UL (ref 0.1–1)
MONOCYTES NFR BLD AUTO: 9.1 %
NEUTROPHILS # BLD AUTO: 4.22 X10 (3) UL (ref 1.5–7.7)
NEUTROPHILS # BLD AUTO: 4.22 X10(3) UL (ref 1.5–7.7)
NEUTROPHILS NFR BLD AUTO: 66.2 %
NITRITE UR QL STRIP.AUTO: NEGATIVE
NONHDLC SERPL-MCNC: 84 MG/DL (ref ?–130)
OSMOLALITY SERPL CALC.SUM OF ELEC: 291 MOSM/KG (ref 275–295)
PH UR: 6.5 [PH] (ref 5–8)
PLATELET # BLD AUTO: 127 10(3)UL (ref 150–450)
POTASSIUM SERPL-SCNC: 4.3 MMOL/L (ref 3.5–5.1)
PROT SERPL-MCNC: 6.5 G/DL (ref 6.4–8.2)
PROT UR-MCNC: NEGATIVE MG/DL
RBC # BLD AUTO: 4.56 X10(6)UL
SODIUM SERPL-SCNC: 138 MMOL/L (ref 136–145)
SP GR UR STRIP: 1.01 (ref 1–1.03)
TRIGL SERPL-MCNC: 53 MG/DL (ref 30–149)
TSI SER-ACNC: 0.1 MIU/ML (ref 0.36–3.74)
UROBILINOGEN UR STRIP-ACNC: 0.2
VLDLC SERPL CALC-MCNC: 8 MG/DL (ref 0–30)
WBC # BLD AUTO: 6.4 X10(3) UL (ref 4–11)

## 2022-06-16 PROCEDURE — 84443 ASSAY THYROID STIM HORMONE: CPT

## 2022-06-16 PROCEDURE — 80053 COMPREHEN METABOLIC PANEL: CPT

## 2022-06-16 PROCEDURE — 80061 LIPID PANEL: CPT

## 2022-06-16 PROCEDURE — 36415 COLL VENOUS BLD VENIPUNCTURE: CPT

## 2022-06-16 PROCEDURE — 85025 COMPLETE CBC W/AUTO DIFF WBC: CPT

## 2022-06-17 ENCOUNTER — TELEPHONE (OUTPATIENT)
Dept: INTERNAL MEDICINE CLINIC | Facility: CLINIC | Age: 82
End: 2022-06-17

## 2022-06-17 NOTE — TELEPHONE ENCOUNTER
Phone call to patient and situation discussed. I told the patient that it is fine for he and his wife to switch appointments. I can see him on Monday, June 28 and I can see his wife in July as scheduled which I believe is July 15. Both of them are scheduled for annual physical examinations. I will forward this message to the  to note and to nursing as an FYI.   Thank you!!

## 2022-06-17 NOTE — TELEPHONE ENCOUNTER
Pt called, his appt was rescheduled to 7/15/22  His wife has appt on 6/20/22  Pt is experiencing wrist pain and would like to discuss lab results  [facility] for them to swap appointments?   Tasked to Dr Erin Zeng to advise

## 2022-06-20 ENCOUNTER — OFFICE VISIT (OUTPATIENT)
Dept: INTERNAL MEDICINE CLINIC | Facility: CLINIC | Age: 82
End: 2022-06-20
Payer: MEDICARE

## 2022-06-20 VITALS
DIASTOLIC BLOOD PRESSURE: 60 MMHG | HEART RATE: 60 BPM | BODY MASS INDEX: 27.68 KG/M2 | SYSTOLIC BLOOD PRESSURE: 118 MMHG | WEIGHT: 208.88 LBS | TEMPERATURE: 98 F | OXYGEN SATURATION: 98 % | HEIGHT: 73 IN

## 2022-06-20 DIAGNOSIS — R29.898 RIGHT HAND WEAKNESS: ICD-10-CM

## 2022-06-20 DIAGNOSIS — E78.00 HYPERCHOLESTEROLEMIA: ICD-10-CM

## 2022-06-20 DIAGNOSIS — R56.9 SEIZURE (HCC): ICD-10-CM

## 2022-06-20 DIAGNOSIS — R73.01 ABNORMAL FASTING GLUCOSE: ICD-10-CM

## 2022-06-20 DIAGNOSIS — R79.89 ELEVATED TSH: ICD-10-CM

## 2022-06-20 DIAGNOSIS — D64.9 ANEMIA, UNSPECIFIED TYPE: ICD-10-CM

## 2022-06-20 DIAGNOSIS — K75.4 AUTOIMMUNE HEPATITIS (HCC): ICD-10-CM

## 2022-06-20 DIAGNOSIS — R53.83 FATIGUE, UNSPECIFIED TYPE: ICD-10-CM

## 2022-06-20 DIAGNOSIS — I48.0 PAROXYSMAL ATRIAL FIBRILLATION (HCC): ICD-10-CM

## 2022-06-20 DIAGNOSIS — D69.6 THROMBOCYTOPENIA (HCC): ICD-10-CM

## 2022-06-20 DIAGNOSIS — E03.9 HYPOTHYROIDISM, UNSPECIFIED TYPE: ICD-10-CM

## 2022-06-20 DIAGNOSIS — I10 ESSENTIAL HYPERTENSION: ICD-10-CM

## 2022-06-20 DIAGNOSIS — I67.1 CEREBRAL ANEURYSM: ICD-10-CM

## 2022-06-20 DIAGNOSIS — N18.31 STAGE 3A CHRONIC KIDNEY DISEASE (HCC): ICD-10-CM

## 2022-06-20 DIAGNOSIS — Z00.00 ANNUAL PHYSICAL EXAM: Primary | ICD-10-CM

## 2022-06-20 DIAGNOSIS — M15.9 PRIMARY OSTEOARTHRITIS INVOLVING MULTIPLE JOINTS: ICD-10-CM

## 2022-06-20 PROBLEM — N18.4 STAGE 4 CHRONIC KIDNEY DISEASE (HCC): Status: RESOLVED | Noted: 2020-11-20 | Resolved: 2022-06-20

## 2022-06-20 PROBLEM — I50.33 ACUTE ON CHRONIC HEART FAILURE WITH PRESERVED EJECTION FRACTION (HFPEF) (HCC): Chronic | Status: RESOLVED | Noted: 2020-10-22 | Resolved: 2022-06-20

## 2022-06-20 LAB
OCCULT BLOOD, STOOL 1: NEGATIVE
PERFORMANCE MONITORS CORRECT (YES/NO): YES YES/NO

## 2022-06-20 PROCEDURE — G0439 PPPS, SUBSEQ VISIT: HCPCS | Performed by: INTERNAL MEDICINE

## 2022-06-20 PROCEDURE — 93000 ELECTROCARDIOGRAM COMPLETE: CPT | Performed by: INTERNAL MEDICINE

## 2022-06-20 PROCEDURE — 1125F AMNT PAIN NOTED PAIN PRSNT: CPT | Performed by: INTERNAL MEDICINE

## 2022-06-20 PROCEDURE — 99215 OFFICE O/P EST HI 40 MIN: CPT | Performed by: INTERNAL MEDICINE

## 2022-06-20 PROCEDURE — 82272 OCCULT BLD FECES 1-3 TESTS: CPT | Performed by: INTERNAL MEDICINE

## 2022-06-20 RX ORDER — LEVOTHYROXINE SODIUM 88 UG/1
88 TABLET ORAL
Qty: 90 TABLET | Refills: 3 | Status: SHIPPED | OUTPATIENT
Start: 2022-06-20 | End: 2023-06-20

## 2022-06-20 NOTE — PATIENT INSTRUCTIONS
1.  He is to continue his current diet, medication and activity. 2.  Patient has received his 2 COVID booster vaccines. 3.  Patient is to consider getting his Shingrix vaccine at his pharmacy. This is a 2 dose vaccine with the second dose being 2 to 6 months after the first dose. 4.  I will decrease the patient's dose of Synthroid to 0.088 mg orally daily. 5.  I will place an order in the system for the patient to get an EMG with NCV of his right arm. I am really concerned about a possible right carpal tunnel syndrome. 6.  Patient may use Tylenol Extra Strength 2 tablets 2 or 3 times a day as necessary for pain. He does not need to take the Tylenol Extra Strength unless he has pain. 7.  I will plan to see the patient back in 1 month. 8.  Pt  will need a TSH in about 3 months.

## 2022-07-11 ENCOUNTER — PROCEDURE VISIT (OUTPATIENT)
Dept: PHYSICAL MEDICINE AND REHAB | Facility: CLINIC | Age: 82
End: 2022-07-11
Payer: MEDICARE

## 2022-07-11 DIAGNOSIS — G56.01 CARPAL TUNNEL SYNDROME OF RIGHT WRIST: Primary | ICD-10-CM

## 2022-07-11 PROCEDURE — 95908 NRV CNDJ TST 3-4 STUDIES: CPT | Performed by: PHYSICAL MEDICINE & REHABILITATION

## 2022-07-11 PROCEDURE — 95886 MUSC TEST DONE W/N TEST COMP: CPT | Performed by: PHYSICAL MEDICINE & REHABILITATION

## 2022-07-15 PROBLEM — G56.01 CARPAL TUNNEL SYNDROME OF RIGHT WRIST: Status: ACTIVE | Noted: 2022-07-15

## 2022-07-28 ENCOUNTER — OFFICE VISIT (OUTPATIENT)
Dept: INTERNAL MEDICINE CLINIC | Facility: CLINIC | Age: 82
End: 2022-07-28
Payer: MEDICARE

## 2022-07-28 VITALS
SYSTOLIC BLOOD PRESSURE: 120 MMHG | BODY MASS INDEX: 27.81 KG/M2 | OXYGEN SATURATION: 99 % | HEIGHT: 73 IN | DIASTOLIC BLOOD PRESSURE: 70 MMHG | WEIGHT: 209.81 LBS | TEMPERATURE: 98 F | HEART RATE: 56 BPM

## 2022-07-28 DIAGNOSIS — N18.31 STAGE 3A CHRONIC KIDNEY DISEASE (HCC): ICD-10-CM

## 2022-07-28 DIAGNOSIS — I48.0 PAROXYSMAL ATRIAL FIBRILLATION (HCC): ICD-10-CM

## 2022-07-28 DIAGNOSIS — D69.6 THROMBOCYTOPENIA (HCC): ICD-10-CM

## 2022-07-28 DIAGNOSIS — R79.89 ELEVATED TSH: ICD-10-CM

## 2022-07-28 DIAGNOSIS — R79.89 LOW TSH LEVEL: ICD-10-CM

## 2022-07-28 DIAGNOSIS — E78.00 HYPERCHOLESTEROLEMIA: ICD-10-CM

## 2022-07-28 DIAGNOSIS — R53.83 FATIGUE, UNSPECIFIED TYPE: ICD-10-CM

## 2022-07-28 DIAGNOSIS — I10 ESSENTIAL HYPERTENSION: ICD-10-CM

## 2022-07-28 DIAGNOSIS — D64.9 ANEMIA, UNSPECIFIED TYPE: ICD-10-CM

## 2022-07-28 DIAGNOSIS — G56.01 CARPAL TUNNEL SYNDROME OF RIGHT WRIST: Primary | ICD-10-CM

## 2022-07-28 DIAGNOSIS — E03.9 HYPOTHYROIDISM, UNSPECIFIED TYPE: ICD-10-CM

## 2022-07-28 DIAGNOSIS — M15.9 PRIMARY OSTEOARTHRITIS INVOLVING MULTIPLE JOINTS: ICD-10-CM

## 2022-07-28 PROCEDURE — 99214 OFFICE O/P EST MOD 30 MIN: CPT | Performed by: INTERNAL MEDICINE

## 2022-07-28 PROCEDURE — 1126F AMNT PAIN NOTED NONE PRSNT: CPT | Performed by: INTERNAL MEDICINE

## 2022-07-28 NOTE — PATIENT INSTRUCTIONS
1.  Patient is to continue his current diet, medication and activity. 2.  Patient is to obtain a right wrist splint from a medical supply store that he can apply at night and use on a nightly basis to try to dorsiflex the right wrist and take the pressure off the median nerve. 3.  I will see the patient back in 2 months with blood tests which will include a TSH, CBC, BMP, lipid panel, AST and ALT. 4.  I will see the patient back sooner as necessary. 5.  I will refer the patient to see Dr. Rain Garcia or one of his associates regarding patient's right carpal tunnel syndrome. Patient can decide if the splint is is helping him or not. If he is not improving he can then decide to see Dr. Rain Garcia or one of his associates.

## 2022-08-15 ENCOUNTER — PATIENT OUTREACH (OUTPATIENT)
Dept: CASE MANAGEMENT | Age: 82
End: 2022-08-15

## 2022-08-22 NOTE — PROGRESS NOTES
Patient called back and stated he is not interested at this time but will call for CCM if he changes his mind. Patient has information/brocures.

## 2022-09-13 RX ORDER — METOPROLOL SUCCINATE 25 MG/1
25 TABLET, EXTENDED RELEASE ORAL NIGHTLY
Qty: 90 TABLET | Refills: 1 | Status: SHIPPED | OUTPATIENT
Start: 2022-09-13

## 2022-09-19 ENCOUNTER — LAB ENCOUNTER (OUTPATIENT)
Dept: LAB | Age: 82
End: 2022-09-19
Attending: INTERNAL MEDICINE

## 2022-09-19 DIAGNOSIS — E78.00 HYPERCHOLESTEROLEMIA: ICD-10-CM

## 2022-09-19 DIAGNOSIS — N18.31 STAGE 3A CHRONIC KIDNEY DISEASE (HCC): ICD-10-CM

## 2022-09-19 DIAGNOSIS — R53.83 FATIGUE, UNSPECIFIED TYPE: ICD-10-CM

## 2022-09-19 DIAGNOSIS — R79.89 LOW TSH LEVEL: ICD-10-CM

## 2022-09-19 DIAGNOSIS — D69.6 THROMBOCYTOPENIA (HCC): ICD-10-CM

## 2022-09-19 DIAGNOSIS — E03.9 HYPOTHYROIDISM, UNSPECIFIED TYPE: ICD-10-CM

## 2022-09-19 LAB
ALT SERPL-CCNC: 21 U/L
ANION GAP SERPL CALC-SCNC: 5 MMOL/L (ref 0–18)
AST SERPL-CCNC: 17 U/L (ref 15–37)
BASOPHILS # BLD AUTO: 0.04 X10(3) UL (ref 0–0.2)
BASOPHILS NFR BLD AUTO: 0.7 %
BUN BLD-MCNC: 25 MG/DL (ref 7–18)
BUN/CREAT SERPL: 20 (ref 10–20)
CALCIUM BLD-MCNC: 9.3 MG/DL (ref 8.5–10.1)
CHLORIDE SERPL-SCNC: 105 MMOL/L (ref 98–112)
CHOLEST SERPL-MCNC: 166 MG/DL (ref ?–200)
CO2 SERPL-SCNC: 27 MMOL/L (ref 21–32)
CREAT BLD-MCNC: 1.25 MG/DL
DEPRECATED RDW RBC AUTO: 44 FL (ref 35.1–46.3)
EOSINOPHIL # BLD AUTO: 0.21 X10(3) UL (ref 0–0.7)
EOSINOPHIL NFR BLD AUTO: 3.8 %
ERYTHROCYTE [DISTWIDTH] IN BLOOD BY AUTOMATED COUNT: 13.3 % (ref 11–15)
FASTING PATIENT LIPID ANSWER: YES
FASTING STATUS PATIENT QL REPORTED: YES
GFR SERPLBLD BASED ON 1.73 SQ M-ARVRAT: 58 ML/MIN/1.73M2 (ref 60–?)
GLUCOSE BLD-MCNC: 97 MG/DL (ref 70–99)
HCT VFR BLD AUTO: 41.8 %
HDLC SERPL-MCNC: 60 MG/DL (ref 40–59)
HGB BLD-MCNC: 14 G/DL
IMM GRANULOCYTES # BLD AUTO: 0.01 X10(3) UL (ref 0–1)
IMM GRANULOCYTES NFR BLD: 0.2 %
LDLC SERPL CALC-MCNC: 93 MG/DL (ref ?–100)
LYMPHOCYTES # BLD AUTO: 1.28 X10(3) UL (ref 1–4)
LYMPHOCYTES NFR BLD AUTO: 23.1 %
MCH RBC QN AUTO: 30.3 PG (ref 26–34)
MCHC RBC AUTO-ENTMCNC: 33.5 G/DL (ref 31–37)
MCV RBC AUTO: 90.5 FL
MONOCYTES # BLD AUTO: 0.48 X10(3) UL (ref 0.1–1)
MONOCYTES NFR BLD AUTO: 8.7 %
NEUTROPHILS # BLD AUTO: 3.52 X10 (3) UL (ref 1.5–7.7)
NEUTROPHILS # BLD AUTO: 3.52 X10(3) UL (ref 1.5–7.7)
NEUTROPHILS NFR BLD AUTO: 63.5 %
NONHDLC SERPL-MCNC: 106 MG/DL (ref ?–130)
OSMOLALITY SERPL CALC.SUM OF ELEC: 288 MOSM/KG (ref 275–295)
PLATELET # BLD AUTO: 136 10(3)UL (ref 150–450)
POTASSIUM SERPL-SCNC: 4.2 MMOL/L (ref 3.5–5.1)
RBC # BLD AUTO: 4.62 X10(6)UL
SODIUM SERPL-SCNC: 137 MMOL/L (ref 136–145)
TRIGL SERPL-MCNC: 67 MG/DL (ref 30–149)
TSI SER-ACNC: 1.32 MIU/ML (ref 0.36–3.74)
VLDLC SERPL CALC-MCNC: 11 MG/DL (ref 0–30)
WBC # BLD AUTO: 5.5 X10(3) UL (ref 4–11)

## 2022-09-19 PROCEDURE — 36415 COLL VENOUS BLD VENIPUNCTURE: CPT

## 2022-09-19 PROCEDURE — 84450 TRANSFERASE (AST) (SGOT): CPT

## 2022-09-19 PROCEDURE — 84460 ALANINE AMINO (ALT) (SGPT): CPT

## 2022-09-19 PROCEDURE — 84443 ASSAY THYROID STIM HORMONE: CPT

## 2022-09-19 PROCEDURE — 80061 LIPID PANEL: CPT

## 2022-09-19 PROCEDURE — 80048 BASIC METABOLIC PNL TOTAL CA: CPT

## 2022-09-19 PROCEDURE — 85025 COMPLETE CBC W/AUTO DIFF WBC: CPT

## 2022-11-09 ENCOUNTER — LAB ENCOUNTER (OUTPATIENT)
Dept: LAB | Age: 82
End: 2022-11-09
Attending: INTERNAL MEDICINE
Payer: MEDICARE

## 2022-11-09 DIAGNOSIS — N18.30 STAGE 3 CHRONIC KIDNEY DISEASE, UNSPECIFIED WHETHER STAGE 3A OR 3B CKD (HCC): ICD-10-CM

## 2022-11-09 LAB
ALBUMIN SERPL-MCNC: 3.5 G/DL (ref 3.4–5)
ANION GAP SERPL CALC-SCNC: 6 MMOL/L (ref 0–18)
BUN BLD-MCNC: 20 MG/DL (ref 7–18)
BUN/CREAT SERPL: 15.5 (ref 10–20)
CALCIUM BLD-MCNC: 9.1 MG/DL (ref 8.5–10.1)
CHLORIDE SERPL-SCNC: 105 MMOL/L (ref 98–112)
CO2 SERPL-SCNC: 27 MMOL/L (ref 21–32)
CREAT BLD-MCNC: 1.29 MG/DL
GFR SERPLBLD BASED ON 1.73 SQ M-ARVRAT: 56 ML/MIN/1.73M2 (ref 60–?)
GLUCOSE BLD-MCNC: 112 MG/DL (ref 70–99)
OSMOLALITY SERPL CALC.SUM OF ELEC: 289 MOSM/KG (ref 275–295)
PHOSPHATE SERPL-MCNC: 2.9 MG/DL (ref 2.5–4.9)
POTASSIUM SERPL-SCNC: 4.5 MMOL/L (ref 3.5–5.1)
PTH-INTACT SERPL-MCNC: 86.1 PG/ML (ref 18.5–88)
SODIUM SERPL-SCNC: 138 MMOL/L (ref 136–145)

## 2022-11-09 PROCEDURE — 83970 ASSAY OF PARATHORMONE: CPT

## 2022-11-09 PROCEDURE — 36415 COLL VENOUS BLD VENIPUNCTURE: CPT

## 2022-11-09 PROCEDURE — 80069 RENAL FUNCTION PANEL: CPT

## 2022-11-15 ENCOUNTER — TELEPHONE (OUTPATIENT)
Dept: INTERNAL MEDICINE CLINIC | Facility: CLINIC | Age: 82
End: 2022-11-15

## 2022-11-15 NOTE — TELEPHONE ENCOUNTER
TO Dr. Cherry Tanner-- can you please advise on lab results from 9/19/22 for patient in Dr. Gardiner Born absence? Thanks!

## 2022-11-22 ENCOUNTER — OFFICE VISIT (OUTPATIENT)
Dept: NEPHROLOGY | Facility: CLINIC | Age: 82
End: 2022-11-22
Payer: MEDICARE

## 2022-11-22 VITALS
WEIGHT: 211 LBS | DIASTOLIC BLOOD PRESSURE: 58 MMHG | SYSTOLIC BLOOD PRESSURE: 110 MMHG | BODY MASS INDEX: 28.58 KG/M2 | HEIGHT: 72 IN | HEART RATE: 70 BPM

## 2022-11-22 DIAGNOSIS — I50.30 HEART FAILURE WITH PRESERVED EJECTION FRACTION, UNSPECIFIED HF CHRONICITY (HCC): ICD-10-CM

## 2022-11-22 DIAGNOSIS — N18.30 STAGE 3 CHRONIC KIDNEY DISEASE, UNSPECIFIED WHETHER STAGE 3A OR 3B CKD (HCC): Primary | ICD-10-CM

## 2022-11-22 PROCEDURE — 99214 OFFICE O/P EST MOD 30 MIN: CPT | Performed by: INTERNAL MEDICINE

## 2022-12-09 NOTE — HOME CARE LIAISON
2nd message left Left message for pt to schedule nurse AWV- This can be done  in office or via telephone-( please schedule as a VVAWV appt type if telephone.)  Due after 12/16/22 Received referral from [facility]. Per request spoke with patient's wife Pavithra Szymanski, confirmed   agreeable to [facility], pending orders. All questions addressed and answered.      Patient will need a face to face entered prior to dis Detail Level: Zone

## 2023-01-30 ENCOUNTER — TELEPHONE (OUTPATIENT)
Dept: INTERNAL MEDICINE CLINIC | Facility: CLINIC | Age: 83
End: 2023-01-30

## 2023-01-30 DIAGNOSIS — E78.00 HYPERCHOLESTEROLEMIA: ICD-10-CM

## 2023-01-30 DIAGNOSIS — D64.9 ANEMIA, UNSPECIFIED TYPE: Primary | ICD-10-CM

## 2023-01-30 DIAGNOSIS — D69.6 THROMBOCYTOPENIA (HCC): ICD-10-CM

## 2023-01-30 DIAGNOSIS — N18.31 STAGE 3A CHRONIC KIDNEY DISEASE (HCC): ICD-10-CM

## 2023-01-30 NOTE — TELEPHONE ENCOUNTER
Spoke with patient and relayed MD's message. Verbalized understanding and agreement with plan. instructions provided to fast day of blood work.

## 2023-01-30 NOTE — TELEPHONE ENCOUNTER
Patient is calling today, wondering if he needs blood work before his appointment on 2/9/2023. Patient states his original appointment was scheduled in September so he completed his blood work then. Patient is wondering if he needs to complete more blood work before this next appointment.       # [phone]

## 2023-01-30 NOTE — TELEPHONE ENCOUNTER
Noted.  I reviewed patient's chart. Please notify patient that I have placed an order in the system for him to have blood tests prior to seeing me which will include a CBC, BMP, lipid panel, AST and ALT. I will forward this message to nursing.   Thank you!!

## 2023-02-06 ENCOUNTER — LAB ENCOUNTER (OUTPATIENT)
Dept: LAB | Age: 83
End: 2023-02-06
Attending: INTERNAL MEDICINE
Payer: MEDICARE

## 2023-02-06 DIAGNOSIS — N18.31 STAGE 3A CHRONIC KIDNEY DISEASE (HCC): ICD-10-CM

## 2023-02-06 DIAGNOSIS — D64.9 ANEMIA, UNSPECIFIED TYPE: ICD-10-CM

## 2023-02-06 DIAGNOSIS — E78.00 HYPERCHOLESTEROLEMIA: ICD-10-CM

## 2023-02-06 DIAGNOSIS — D69.6 THROMBOCYTOPENIA (HCC): ICD-10-CM

## 2023-02-06 LAB
ALT SERPL-CCNC: 20 U/L
ANION GAP SERPL CALC-SCNC: 2 MMOL/L (ref 0–18)
AST SERPL-CCNC: 15 U/L (ref 15–37)
BASOPHILS # BLD AUTO: 0.04 X10(3) UL (ref 0–0.2)
BASOPHILS NFR BLD AUTO: 0.9 %
BUN BLD-MCNC: 24 MG/DL (ref 7–18)
BUN/CREAT SERPL: 18.2 (ref 10–20)
CALCIUM BLD-MCNC: 9.1 MG/DL (ref 8.5–10.1)
CHLORIDE SERPL-SCNC: 106 MMOL/L (ref 98–112)
CHOLEST SERPL-MCNC: 164 MG/DL (ref ?–200)
CO2 SERPL-SCNC: 30 MMOL/L (ref 21–32)
CREAT BLD-MCNC: 1.32 MG/DL
DEPRECATED RDW RBC AUTO: 46 FL (ref 35.1–46.3)
EOSINOPHIL # BLD AUTO: 0.24 X10(3) UL (ref 0–0.7)
EOSINOPHIL NFR BLD AUTO: 5.4 %
ERYTHROCYTE [DISTWIDTH] IN BLOOD BY AUTOMATED COUNT: 13.5 % (ref 11–15)
FASTING PATIENT LIPID ANSWER: YES
FASTING STATUS PATIENT QL REPORTED: YES
GFR SERPLBLD BASED ON 1.73 SQ M-ARVRAT: 54 ML/MIN/1.73M2 (ref 60–?)
GLUCOSE BLD-MCNC: 103 MG/DL (ref 70–99)
HCT VFR BLD AUTO: 43.2 %
HDLC SERPL-MCNC: 65 MG/DL (ref 40–59)
HGB BLD-MCNC: 14.4 G/DL
IMM GRANULOCYTES # BLD AUTO: 0.01 X10(3) UL (ref 0–1)
IMM GRANULOCYTES NFR BLD: 0.2 %
LDLC SERPL CALC-MCNC: 88 MG/DL (ref ?–100)
LYMPHOCYTES # BLD AUTO: 1.29 X10(3) UL (ref 1–4)
LYMPHOCYTES NFR BLD AUTO: 28.8 %
MCH RBC QN AUTO: 30.7 PG (ref 26–34)
MCHC RBC AUTO-ENTMCNC: 33.3 G/DL (ref 31–37)
MCV RBC AUTO: 92.1 FL
MONOCYTES # BLD AUTO: 0.42 X10(3) UL (ref 0.1–1)
MONOCYTES NFR BLD AUTO: 9.4 %
NEUTROPHILS # BLD AUTO: 2.48 X10 (3) UL (ref 1.5–7.7)
NEUTROPHILS # BLD AUTO: 2.48 X10(3) UL (ref 1.5–7.7)
NEUTROPHILS NFR BLD AUTO: 55.3 %
NONHDLC SERPL-MCNC: 99 MG/DL (ref ?–130)
OSMOLALITY SERPL CALC.SUM OF ELEC: 290 MOSM/KG (ref 275–295)
PLATELET # BLD AUTO: 109 10(3)UL (ref 150–450)
POTASSIUM SERPL-SCNC: 4.7 MMOL/L (ref 3.5–5.1)
RBC # BLD AUTO: 4.69 X10(6)UL
SODIUM SERPL-SCNC: 138 MMOL/L (ref 136–145)
TRIGL SERPL-MCNC: 57 MG/DL (ref 30–149)
VLDLC SERPL CALC-MCNC: 9 MG/DL (ref 0–30)
WBC # BLD AUTO: 4.5 X10(3) UL (ref 4–11)

## 2023-02-06 PROCEDURE — 80061 LIPID PANEL: CPT

## 2023-02-06 PROCEDURE — 84450 TRANSFERASE (AST) (SGOT): CPT

## 2023-02-06 PROCEDURE — 84460 ALANINE AMINO (ALT) (SGPT): CPT

## 2023-02-06 PROCEDURE — 80048 BASIC METABOLIC PNL TOTAL CA: CPT

## 2023-02-06 PROCEDURE — 85025 COMPLETE CBC W/AUTO DIFF WBC: CPT

## 2023-02-09 ENCOUNTER — OFFICE VISIT (OUTPATIENT)
Dept: INTERNAL MEDICINE CLINIC | Facility: CLINIC | Age: 83
End: 2023-02-09

## 2023-02-09 VITALS
WEIGHT: 211 LBS | TEMPERATURE: 99 F | SYSTOLIC BLOOD PRESSURE: 130 MMHG | HEART RATE: 64 BPM | DIASTOLIC BLOOD PRESSURE: 74 MMHG | HEIGHT: 72 IN | BODY MASS INDEX: 28.58 KG/M2 | OXYGEN SATURATION: 98 %

## 2023-02-09 DIAGNOSIS — E78.00 HYPERCHOLESTEROLEMIA: ICD-10-CM

## 2023-02-09 DIAGNOSIS — D69.6 THROMBOCYTOPENIA (HCC): ICD-10-CM

## 2023-02-09 DIAGNOSIS — R53.83 FATIGUE, UNSPECIFIED TYPE: ICD-10-CM

## 2023-02-09 DIAGNOSIS — D64.9 ANEMIA, UNSPECIFIED TYPE: ICD-10-CM

## 2023-02-09 DIAGNOSIS — M15.9 PRIMARY OSTEOARTHRITIS INVOLVING MULTIPLE JOINTS: ICD-10-CM

## 2023-02-09 DIAGNOSIS — H61.21 IMPACTED CERUMEN OF RIGHT EAR: ICD-10-CM

## 2023-02-09 DIAGNOSIS — G56.01 CARPAL TUNNEL SYNDROME OF RIGHT WRIST: ICD-10-CM

## 2023-02-09 DIAGNOSIS — K75.4 AUTOIMMUNE HEPATITIS (HCC): ICD-10-CM

## 2023-02-09 DIAGNOSIS — I10 ESSENTIAL HYPERTENSION: Primary | ICD-10-CM

## 2023-02-09 DIAGNOSIS — R73.01 ABNORMAL FASTING GLUCOSE: ICD-10-CM

## 2023-02-09 DIAGNOSIS — E03.9 HYPOTHYROIDISM, UNSPECIFIED TYPE: ICD-10-CM

## 2023-02-09 DIAGNOSIS — N18.31 STAGE 3A CHRONIC KIDNEY DISEASE (HCC): ICD-10-CM

## 2023-02-09 DIAGNOSIS — I67.1 CEREBRAL ANEURYSM: ICD-10-CM

## 2023-02-09 DIAGNOSIS — I48.0 PAROXYSMAL ATRIAL FIBRILLATION (HCC): ICD-10-CM

## 2023-02-09 DIAGNOSIS — Z00.00 ANNUAL PHYSICAL EXAM: ICD-10-CM

## 2023-02-09 PROCEDURE — 99214 OFFICE O/P EST MOD 30 MIN: CPT | Performed by: INTERNAL MEDICINE

## 2023-02-09 PROCEDURE — 3008F BODY MASS INDEX DOCD: CPT | Performed by: INTERNAL MEDICINE

## 2023-02-09 PROCEDURE — 1126F AMNT PAIN NOTED NONE PRSNT: CPT | Performed by: INTERNAL MEDICINE

## 2023-02-09 PROCEDURE — 3078F DIAST BP <80 MM HG: CPT | Performed by: INTERNAL MEDICINE

## 2023-02-09 PROCEDURE — 3075F SYST BP GE 130 - 139MM HG: CPT | Performed by: INTERNAL MEDICINE

## 2023-02-09 PROCEDURE — 69210 REMOVE IMPACTED EAR WAX UNI: CPT | Performed by: INTERNAL MEDICINE

## 2023-02-09 NOTE — PATIENT INSTRUCTIONS
Patient is to continue his current diet, medication and activity. Patient will continue to follow-up with his consultants as he is doing. I will plan to see the patient back in 3 months with blood test, urinalysis and EKG for his annual physical examination. I will see the patient back sooner as necessary. I have encouraged the patient to obtain his shingles vaccine as he has been considering leaving it.

## 2023-05-01 ENCOUNTER — LAB ENCOUNTER (OUTPATIENT)
Dept: LAB | Age: 83
End: 2023-05-01
Attending: INTERNAL MEDICINE
Payer: MEDICARE

## 2023-05-01 DIAGNOSIS — E03.9 HYPOTHYROIDISM, UNSPECIFIED TYPE: ICD-10-CM

## 2023-05-01 DIAGNOSIS — R73.01 ABNORMAL FASTING GLUCOSE: ICD-10-CM

## 2023-05-01 DIAGNOSIS — Z00.00 ANNUAL PHYSICAL EXAM: ICD-10-CM

## 2023-05-01 DIAGNOSIS — E78.00 HYPERCHOLESTEROLEMIA: ICD-10-CM

## 2023-05-01 DIAGNOSIS — D69.6 THROMBOCYTOPENIA (HCC): ICD-10-CM

## 2023-05-01 DIAGNOSIS — N18.31 STAGE 3A CHRONIC KIDNEY DISEASE (HCC): ICD-10-CM

## 2023-05-01 LAB
ALBUMIN SERPL-MCNC: 3.7 G/DL (ref 3.4–5)
ALBUMIN/GLOB SERPL: 1.2 {RATIO} (ref 1–2)
ALP LIVER SERPL-CCNC: 75 U/L
ALT SERPL-CCNC: 18 U/L
ANION GAP SERPL CALC-SCNC: 4 MMOL/L (ref 0–18)
AST SERPL-CCNC: 17 U/L (ref 15–37)
BASOPHILS # BLD AUTO: 0.05 X10(3) UL (ref 0–0.2)
BASOPHILS NFR BLD AUTO: 1 %
BILIRUB SERPL-MCNC: 1.1 MG/DL (ref 0.1–2)
BILIRUB UR QL STRIP.AUTO: NEGATIVE
BUN BLD-MCNC: 24 MG/DL (ref 7–18)
CALCIUM BLD-MCNC: 9.3 MG/DL (ref 8.5–10.1)
CHLORIDE SERPL-SCNC: 106 MMOL/L (ref 98–112)
CHOLEST SERPL-MCNC: 164 MG/DL (ref ?–200)
CLARITY UR REFRACT.AUTO: CLEAR
CO2 SERPL-SCNC: 29 MMOL/L (ref 21–32)
COLOR UR AUTO: YELLOW
CREAT BLD-MCNC: 1.31 MG/DL
EOSINOPHIL # BLD AUTO: 0.24 X10(3) UL (ref 0–0.7)
EOSINOPHIL NFR BLD AUTO: 4.6 %
ERYTHROCYTE [DISTWIDTH] IN BLOOD BY AUTOMATED COUNT: 13.7 %
FASTING PATIENT LIPID ANSWER: YES
FASTING STATUS PATIENT QL REPORTED: YES
GFR SERPLBLD BASED ON 1.73 SQ M-ARVRAT: 55 ML/MIN/1.73M2 (ref 60–?)
GLOBULIN PLAS-MCNC: 3.1 G/DL (ref 2.8–4.4)
GLUCOSE BLD-MCNC: 105 MG/DL (ref 70–99)
GLUCOSE UR STRIP.AUTO-MCNC: NEGATIVE MG/DL
HCT VFR BLD AUTO: 44.9 %
HDLC SERPL-MCNC: 61 MG/DL (ref 40–59)
HGB BLD-MCNC: 15 G/DL
IMM GRANULOCYTES # BLD AUTO: 0.01 X10(3) UL (ref 0–1)
IMM GRANULOCYTES NFR BLD: 0.2 %
KETONES UR STRIP.AUTO-MCNC: NEGATIVE MG/DL
LDLC SERPL CALC-MCNC: 89 MG/DL (ref ?–100)
LEUKOCYTE ESTERASE UR QL STRIP.AUTO: NEGATIVE
LYMPHOCYTES # BLD AUTO: 1.53 X10(3) UL (ref 1–4)
LYMPHOCYTES NFR BLD AUTO: 29.4 %
MCH RBC QN AUTO: 30.2 PG (ref 26–34)
MCHC RBC AUTO-ENTMCNC: 33.4 G/DL (ref 31–37)
MCV RBC AUTO: 90.3 FL
MONOCYTES # BLD AUTO: 0.46 X10(3) UL (ref 0.1–1)
MONOCYTES NFR BLD AUTO: 8.8 %
NEUTROPHILS # BLD AUTO: 2.91 X10 (3) UL (ref 1.5–7.7)
NEUTROPHILS # BLD AUTO: 2.91 X10(3) UL (ref 1.5–7.7)
NEUTROPHILS NFR BLD AUTO: 56 %
NITRITE UR QL STRIP.AUTO: NEGATIVE
NONHDLC SERPL-MCNC: 103 MG/DL (ref ?–130)
OSMOLALITY SERPL CALC.SUM OF ELEC: 292 MOSM/KG (ref 275–295)
PH UR STRIP.AUTO: 7 [PH] (ref 5–8)
PLATELET # BLD AUTO: 124 10(3)UL (ref 150–450)
POTASSIUM SERPL-SCNC: 4.9 MMOL/L (ref 3.5–5.1)
PROT SERPL-MCNC: 6.8 G/DL (ref 6.4–8.2)
PROT UR STRIP.AUTO-MCNC: NEGATIVE MG/DL
RBC # BLD AUTO: 4.97 X10(6)UL
SODIUM SERPL-SCNC: 139 MMOL/L (ref 136–145)
SP GR UR STRIP.AUTO: 1.01 (ref 1–1.03)
TRIGL SERPL-MCNC: 71 MG/DL (ref 30–149)
TSI SER-ACNC: 1.72 MIU/ML (ref 0.36–3.74)
UROBILINOGEN UR STRIP.AUTO-MCNC: <2 MG/DL
VLDLC SERPL CALC-MCNC: 11 MG/DL (ref 0–30)
WBC # BLD AUTO: 5.2 X10(3) UL (ref 4–11)

## 2023-05-01 PROCEDURE — 80061 LIPID PANEL: CPT

## 2023-05-01 PROCEDURE — 85025 COMPLETE CBC W/AUTO DIFF WBC: CPT

## 2023-05-01 PROCEDURE — 80053 COMPREHEN METABOLIC PANEL: CPT

## 2023-05-01 PROCEDURE — 84443 ASSAY THYROID STIM HORMONE: CPT

## 2023-05-01 PROCEDURE — 81001 URINALYSIS AUTO W/SCOPE: CPT | Performed by: INTERNAL MEDICINE

## 2023-05-08 ENCOUNTER — TELEPHONE (OUTPATIENT)
Dept: INTERNAL MEDICINE CLINIC | Facility: CLINIC | Age: 83
End: 2023-05-08

## 2023-05-08 DIAGNOSIS — E78.00 HYPERCHOLESTEROLEMIA: ICD-10-CM

## 2023-05-08 DIAGNOSIS — D69.6 THROMBOCYTOPENIA (HCC): Primary | ICD-10-CM

## 2023-05-08 DIAGNOSIS — R53.83 FATIGUE, UNSPECIFIED TYPE: ICD-10-CM

## 2023-05-08 DIAGNOSIS — N18.31 STAGE 3A CHRONIC KIDNEY DISEASE (HCC): ICD-10-CM

## 2023-05-08 DIAGNOSIS — R74.8 ELEVATED LIVER ENZYMES: ICD-10-CM

## 2023-05-08 DIAGNOSIS — R73.01 ABNORMAL FASTING GLUCOSE: ICD-10-CM

## 2023-05-08 NOTE — TELEPHONE ENCOUNTER
Pt had labs done for his MA on May 8 that was rescheduled to August 9  Asks if he will need to have labs drawn again ?

## 2023-05-12 NOTE — TELEPHONE ENCOUNTER
Telephone call to pt and recent labs were reviewed with pt. I will place orders for blood tests to be done prior to pt's next visit---CBC. BMP, Hb-a-1-c, Hepatic function panel, Lipid Panel.

## 2023-06-01 ENCOUNTER — PATIENT MESSAGE (OUTPATIENT)
Dept: INTERNAL MEDICINE CLINIC | Facility: CLINIC | Age: 83
End: 2023-06-01

## 2023-06-02 RX ORDER — LEVOTHYROXINE SODIUM 88 UG/1
88 TABLET ORAL
Qty: 90 TABLET | Refills: 3 | Status: SHIPPED | OUTPATIENT
Start: 2023-06-02 | End: 2024-06-01

## 2023-06-02 RX ORDER — METOPROLOL SUCCINATE 25 MG/1
25 TABLET, EXTENDED RELEASE ORAL NIGHTLY
Qty: 90 TABLET | Refills: 3 | Status: SHIPPED | OUTPATIENT
Start: 2023-06-02

## 2023-06-02 NOTE — TELEPHONE ENCOUNTER
From: Low Springer  To: James Roldan MD  Sent: 6/1/2023 8:52 AM CDT  Subject: New Prescriptions    Dr. Nithin Pfeiffer I need two new prescriptions as I have no more refills available for either one. The first is Metoprolol ER Succinate 25 mg Tabs, Qty 90; the second is Levothyroxine 88mcg Tabs, Qty 90. My Pharmacy is [address] at [address] [place] [address], phone [phone].   Thank you,  Mario Shelby

## 2023-08-01 ENCOUNTER — LAB ENCOUNTER (OUTPATIENT)
Dept: LAB | Age: 83
End: 2023-08-01
Attending: INTERNAL MEDICINE
Payer: MEDICARE

## 2023-08-01 DIAGNOSIS — D69.6 THROMBOCYTOPENIA (HCC): ICD-10-CM

## 2023-08-01 DIAGNOSIS — N18.31 STAGE 3A CHRONIC KIDNEY DISEASE (HCC): ICD-10-CM

## 2023-08-01 DIAGNOSIS — R74.8 ELEVATED LIVER ENZYMES: ICD-10-CM

## 2023-08-01 DIAGNOSIS — E78.00 HYPERCHOLESTEROLEMIA: ICD-10-CM

## 2023-08-01 DIAGNOSIS — R53.83 FATIGUE, UNSPECIFIED TYPE: ICD-10-CM

## 2023-08-01 DIAGNOSIS — R73.01 ABNORMAL FASTING GLUCOSE: ICD-10-CM

## 2023-08-01 LAB
ALBUMIN SERPL-MCNC: 3.9 G/DL (ref 3.4–5)
ALP LIVER SERPL-CCNC: 74 U/L
ALT SERPL-CCNC: 20 U/L
ANION GAP SERPL CALC-SCNC: 7 MMOL/L (ref 0–18)
AST SERPL-CCNC: 10 U/L (ref 15–37)
BASOPHILS # BLD AUTO: 0.04 X10(3) UL (ref 0–0.2)
BASOPHILS NFR BLD AUTO: 0.8 %
BILIRUB DIRECT SERPL-MCNC: 0.2 MG/DL (ref 0–0.2)
BILIRUB SERPL-MCNC: 1.3 MG/DL (ref 0.1–2)
BUN BLD-MCNC: 23 MG/DL (ref 7–18)
CALCIUM BLD-MCNC: 9.1 MG/DL (ref 8.5–10.1)
CHLORIDE SERPL-SCNC: 105 MMOL/L (ref 98–112)
CHOLEST SERPL-MCNC: 181 MG/DL (ref ?–200)
CO2 SERPL-SCNC: 26 MMOL/L (ref 21–32)
CREAT BLD-MCNC: 1.28 MG/DL
EGFRCR SERPLBLD CKD-EPI 2021: 56 ML/MIN/1.73M2 (ref 60–?)
EOSINOPHIL # BLD AUTO: 0.23 X10(3) UL (ref 0–0.7)
EOSINOPHIL NFR BLD AUTO: 4.6 %
ERYTHROCYTE [DISTWIDTH] IN BLOOD BY AUTOMATED COUNT: 13.4 %
EST. AVERAGE GLUCOSE BLD GHB EST-MCNC: 123 MG/DL (ref 68–126)
FASTING PATIENT LIPID ANSWER: YES
FASTING STATUS PATIENT QL REPORTED: YES
GLUCOSE BLD-MCNC: 94 MG/DL (ref 70–99)
HBA1C MFR BLD: 5.9 % (ref ?–5.7)
HCT VFR BLD AUTO: 44.2 %
HDLC SERPL-MCNC: 65 MG/DL (ref 40–59)
HGB BLD-MCNC: 14.8 G/DL
IMM GRANULOCYTES # BLD AUTO: 0.03 X10(3) UL (ref 0–1)
IMM GRANULOCYTES NFR BLD: 0.6 %
LDLC SERPL CALC-MCNC: 105 MG/DL (ref ?–100)
LYMPHOCYTES # BLD AUTO: 1.19 X10(3) UL (ref 1–4)
LYMPHOCYTES NFR BLD AUTO: 24 %
MCH RBC QN AUTO: 30.2 PG (ref 26–34)
MCHC RBC AUTO-ENTMCNC: 33.5 G/DL (ref 31–37)
MCV RBC AUTO: 90.2 FL
MONOCYTES # BLD AUTO: 0.42 X10(3) UL (ref 0.1–1)
MONOCYTES NFR BLD AUTO: 8.5 %
NEUTROPHILS # BLD AUTO: 3.04 X10 (3) UL (ref 1.5–7.7)
NEUTROPHILS # BLD AUTO: 3.04 X10(3) UL (ref 1.5–7.7)
NEUTROPHILS NFR BLD AUTO: 61.5 %
NONHDLC SERPL-MCNC: 116 MG/DL (ref ?–130)
OSMOLALITY SERPL CALC.SUM OF ELEC: 289 MOSM/KG (ref 275–295)
PLATELET # BLD AUTO: 126 10(3)UL (ref 150–450)
POTASSIUM SERPL-SCNC: 4.7 MMOL/L (ref 3.5–5.1)
PROT SERPL-MCNC: 6.9 G/DL (ref 6.4–8.2)
RBC # BLD AUTO: 4.9 X10(6)UL
SODIUM SERPL-SCNC: 138 MMOL/L (ref 136–145)
TRIGL SERPL-MCNC: 57 MG/DL (ref 30–149)
VLDLC SERPL CALC-MCNC: 10 MG/DL (ref 0–30)
WBC # BLD AUTO: 5 X10(3) UL (ref 4–11)

## 2023-08-01 PROCEDURE — 80076 HEPATIC FUNCTION PANEL: CPT

## 2023-08-01 PROCEDURE — 80048 BASIC METABOLIC PNL TOTAL CA: CPT

## 2023-08-01 PROCEDURE — 80061 LIPID PANEL: CPT

## 2023-08-01 PROCEDURE — 85025 COMPLETE CBC W/AUTO DIFF WBC: CPT

## 2023-08-01 PROCEDURE — 83036 HEMOGLOBIN GLYCOSYLATED A1C: CPT

## 2023-08-09 ENCOUNTER — OFFICE VISIT (OUTPATIENT)
Dept: INTERNAL MEDICINE CLINIC | Facility: CLINIC | Age: 83
End: 2023-08-09

## 2023-08-09 VITALS
OXYGEN SATURATION: 98 % | BODY MASS INDEX: 28.15 KG/M2 | WEIGHT: 207.81 LBS | HEART RATE: 64 BPM | TEMPERATURE: 98 F | HEIGHT: 72 IN | SYSTOLIC BLOOD PRESSURE: 118 MMHG | DIASTOLIC BLOOD PRESSURE: 80 MMHG

## 2023-08-09 DIAGNOSIS — D69.6 THROMBOCYTOPENIA (HCC): ICD-10-CM

## 2023-08-09 DIAGNOSIS — M15.9 PRIMARY OSTEOARTHRITIS INVOLVING MULTIPLE JOINTS: ICD-10-CM

## 2023-08-09 DIAGNOSIS — G56.01 CARPAL TUNNEL SYNDROME OF RIGHT WRIST: ICD-10-CM

## 2023-08-09 DIAGNOSIS — Z00.00 ANNUAL PHYSICAL EXAM: Primary | ICD-10-CM

## 2023-08-09 DIAGNOSIS — E78.00 HYPERCHOLESTEROLEMIA: ICD-10-CM

## 2023-08-09 DIAGNOSIS — D64.9 ANEMIA, UNSPECIFIED TYPE: ICD-10-CM

## 2023-08-09 DIAGNOSIS — I67.1 CEREBRAL ANEURYSM: ICD-10-CM

## 2023-08-09 DIAGNOSIS — I48.0 PAROXYSMAL ATRIAL FIBRILLATION (HCC): ICD-10-CM

## 2023-08-09 DIAGNOSIS — E03.9 HYPOTHYROIDISM, UNSPECIFIED TYPE: ICD-10-CM

## 2023-08-09 DIAGNOSIS — K75.4 AUTOIMMUNE HEPATITIS (HCC): ICD-10-CM

## 2023-08-09 DIAGNOSIS — R53.83 FATIGUE, UNSPECIFIED TYPE: ICD-10-CM

## 2023-08-09 DIAGNOSIS — N18.31 STAGE 3A CHRONIC KIDNEY DISEASE (HCC): ICD-10-CM

## 2023-08-09 DIAGNOSIS — I10 ESSENTIAL HYPERTENSION: ICD-10-CM

## 2023-08-09 DIAGNOSIS — R73.01 ABNORMAL FASTING GLUCOSE: ICD-10-CM

## 2023-08-09 LAB
ATRIAL RATE: 61 BPM
P AXIS: 62 DEGREES
P-R INTERVAL: 182 MS
Q-T INTERVAL: 426 MS
QRS DURATION: 96 MS
QTC CALCULATION (BEZET): 428 MS
R AXIS: -52 DEGREES
T AXIS: 64 DEGREES
VENTRICULAR RATE: 61 BPM

## 2023-08-09 PROCEDURE — 3074F SYST BP LT 130 MM HG: CPT | Performed by: INTERNAL MEDICINE

## 2023-08-09 PROCEDURE — 96160 PT-FOCUSED HLTH RISK ASSMT: CPT | Performed by: INTERNAL MEDICINE

## 2023-08-09 PROCEDURE — 1170F FXNL STATUS ASSESSED: CPT | Performed by: INTERNAL MEDICINE

## 2023-08-09 PROCEDURE — 93000 ELECTROCARDIOGRAM COMPLETE: CPT | Performed by: INTERNAL MEDICINE

## 2023-08-09 PROCEDURE — 1126F AMNT PAIN NOTED NONE PRSNT: CPT | Performed by: INTERNAL MEDICINE

## 2023-08-09 PROCEDURE — 1160F RVW MEDS BY RX/DR IN RCRD: CPT | Performed by: INTERNAL MEDICINE

## 2023-08-09 PROCEDURE — G0439 PPPS, SUBSEQ VISIT: HCPCS | Performed by: INTERNAL MEDICINE

## 2023-08-09 PROCEDURE — 99214 OFFICE O/P EST MOD 30 MIN: CPT | Performed by: INTERNAL MEDICINE

## 2023-08-09 PROCEDURE — 3008F BODY MASS INDEX DOCD: CPT | Performed by: INTERNAL MEDICINE

## 2023-08-09 PROCEDURE — 3079F DIAST BP 80-89 MM HG: CPT | Performed by: INTERNAL MEDICINE

## 2023-08-09 PROCEDURE — 1159F MED LIST DOCD IN RCRD: CPT | Performed by: INTERNAL MEDICINE

## 2023-08-09 NOTE — PATIENT INSTRUCTIONS
Patient is to continue his current diet, medication and activity. I have encouraged the patient to receive his flu vaccine and, when available, his COVID-vaccine this autumn. I will plan to see the patient back in about 3 months with blood tests as ordered. I will see the patient back sooner as necessary.

## 2023-08-22 ENCOUNTER — OFFICE VISIT (OUTPATIENT)
Dept: NEPHROLOGY | Facility: CLINIC | Age: 83
End: 2023-08-22

## 2023-08-22 VITALS
BODY MASS INDEX: 28.71 KG/M2 | WEIGHT: 212 LBS | DIASTOLIC BLOOD PRESSURE: 64 MMHG | SYSTOLIC BLOOD PRESSURE: 120 MMHG | HEIGHT: 72 IN | HEART RATE: 71 BPM

## 2023-08-22 DIAGNOSIS — N18.30 STAGE 3 CHRONIC KIDNEY DISEASE, UNSPECIFIED WHETHER STAGE 3A OR 3B CKD (HCC): Primary | ICD-10-CM

## 2023-08-22 PROCEDURE — 3008F BODY MASS INDEX DOCD: CPT | Performed by: INTERNAL MEDICINE

## 2023-08-22 PROCEDURE — 3074F SYST BP LT 130 MM HG: CPT | Performed by: INTERNAL MEDICINE

## 2023-08-22 PROCEDURE — 1159F MED LIST DOCD IN RCRD: CPT | Performed by: INTERNAL MEDICINE

## 2023-08-22 PROCEDURE — 99214 OFFICE O/P EST MOD 30 MIN: CPT | Performed by: INTERNAL MEDICINE

## 2023-08-22 PROCEDURE — 3078F DIAST BP <80 MM HG: CPT | Performed by: INTERNAL MEDICINE

## 2023-08-22 NOTE — PROGRESS NOTES
Progress Note     Patient is a 81 yrs old male with pmh of HTN, HL, BPH, CKD stage III, autoimmune hepatitis/cirrhosis, ckd stage III, diastolic dysfunction who presented for follow up     Patient was rehospitalized in October for sepsis / viral enteritis and AARON, A-fib with RVR. Had RHC which showed elevate wedge pressure and was diuresed and started on [facility]. Was discharged on bumex 1 mg BID. Was admitted to [facility] for brain aneurysm found after an episode of headaches and unconsciousness. CTA showed partially thrombosed ACCA aneurysm     Echo showed EF 50-55% with normal wall motion. Diastolic function cannot be assessed. Patient was admitted in hospital on 7/8 for elevated LFT - patient had liver biopsy and had been following at [facility] with Dr. Carissa Dickinson. His hepatitis A, B and C were negative, FLACO and CMV negative and COVID was negative. EBV IgM was negative and early IgG was +ve. Was started on prednisone end of August and transitioned to budesonide and tapered and now off of it since beginning of April     No leg swelling or chest pain . MRI brain pending for aneurysm.      HISTORY:  Past Medical History:   Diagnosis Date    Arthritis 2013    MRI on right ankle    Borderline high blood pressure 2009    Borderline high cholesterol     BPH (benign prostatic hyperplasia) 2009    Diverticulosis     Elevated liver enzymes     Enlarged prostate 2009    Essential hypertension     Hearing impairment     High blood pressure     Left axis deviation 2002    Prostate enlargement 03/06/2014    Surgery to reduce his prostate    Visual impairment       Past Surgical History:   Procedure Laterality Date    COLONOSCOPY N/A 3/17/2022    Procedure: COLONOSCOPY;  Surgeon: Claritza Iverson MD;  Location: [address] [place] [address] ENDOSCOPY    COLONOSCOPY  03/2022    CONTACT LASER SURGERY OF PROSTATE  20104    cystoscopy w/ [place] laser ablation of prostate     DIL URETHRA STRIC,MALE,INITIAL  04/14/2014    Dialate Urethra    FOOT SURGERY 04/17/2014    Right Ankle Surgery    OTHER SURGICAL HISTORY  4-    Right Ankle Tendon Repair           Medications (Active prior to today's visit):  Current Outpatient Medications   Medication Sig Dispense Refill    metoprolol succinate ER 25 MG Oral Tablet 24 Hr Take 1 tablet (25 mg total) by mouth nightly. 90 tablet 3    levothyroxine (SYNTHROID) 88 MCG Oral Tab Take 1 tablet (88 mcg total) by mouth before breakfast. 90 tablet 3    Cholecalciferol 50 MCG (2000 UT) Oral Cap Take 2,000 Units by mouth daily. aspirin 81 MG Oral Tab Take 1 tablet (81 mg total) by mouth daily. Allergies:    Azathioprine            RASH    Comment:Mouth and nose sores, shaking.  am      ROS:     Constitutional:  Negative for decreased activity, fever, irritability and lethargy  ENMT:  Negative for ear drainage, hearing loss and nasal drainage  Eyes:  Negative for eye discharge and vision loss  Cardiovascular:  Negative for chest pain, sob  Respiratory:  Negative for cough, dyspnea and wheezing  Gastrointestinal:  Negative for abdominal pain, constipation  Genitourinary:  Negative for dysuria and hematuria  Endocrine:  Negative for abnormal sleep patterns  Hema/Lymph:  Negative for easy bleeding and easy bruising  Integumentary:  Negative for pruritus and rash  Musculoskeletal:  Negative for bone/joint symptoms  Neurological:  Negative for gait disturbance  Psychiatric:  Negative for inappropriate interaction and psychiatric symptoms       08/22/23  1301   BP: 120/64   Pulse: 71     Wt Readings from Last 6 Encounters:  08/22/23 : 212 lb (96.2 kg)  08/09/23 : 207 lb 12.8 oz (94.3 kg)  02/09/23 : 211 lb (95.7 kg)  11/22/22 : 211 lb (95.7 kg)  07/28/22 : 209 lb 12.8 oz (95.2 kg)  06/20/22 : 208 lb 14.4 oz (94.8 kg)      PHYSICAL EXAM:   Constitutional: appears well hydrated alert and responsive   Head/Face: normocephalic  Eyes/Vision: normal extraocular motion is intact  Nose/Mouth/Throat:mucous membranes are moist Neck/Thyroid: neck is supple without adenopathy  Skin/Hair: no unusual rashes present, no abnormal bruising noted  Back/Spine: no abnormalities noted  Musculoskeletal: no deformities  Extremities: trace edema  Neurological:  Grossly normal       ASSESSMENT/PLAN:     1. CKD III : non proteinuria   - BUN/Cr 19/1.2 mg/dl in July 2020 with an eGFR 57 ml/min. - stable   - had AARON with creatinine at time of discharge at 2.12 mg/dl.-> peaked at 2.68 mg/dl  -> 1.59 mg/dl with an eGFR 41 ml/min ->1.3 mg/dl with an eGFR 56 ml/min and stable  - UA,urine eosinophile and urine protein unremarkable   - ESR 52 and ANCA negative in beginning   - FLACO, HIV, hepatitis B and C negative in past   - renal US - CT scan from 10/11 not suggestive of any obstructive pathology  - Echo with normal EF and normal wall motion with grade I DD  - avoid nephrotoxins   - calcium in normal range and albumin low normal   - UA no protein or RBC. urine albumin and protein unremarkable   - PTH wnl     2. A-fib with RVR/ grade I diastolic HF:  - Had RHC in October which showed elevate wedge pressure   - off of diuretics and euvolemic on exam   - off of amiodarone and off of apixaban   - weight 224 lbs -> 216 ->210  - home BP stable  - on metoprolol 25 mg daily      2. Autoimmune hepatitis : ?viral etiology   - azathioprine was discontinued and currently off of budesonide   - LFT in normal range and stable   - followed at [facility] with Dr. Jay Jay Hood - not longer need to follow up     Follow up  as needed     Accompanied by wife     Orders This Visit:  No orders of the defined types were placed in this encounter.       Meds This Visit:  Requested Prescriptions      No prescriptions requested or ordered in this encounter       Imaging & Referrals:  None     8/22/2023  Saira Hodgson MD

## 2023-10-31 ENCOUNTER — LAB ENCOUNTER (OUTPATIENT)
Dept: LAB | Age: 83
End: 2023-10-31
Attending: INTERNAL MEDICINE
Payer: MEDICARE

## 2023-10-31 DIAGNOSIS — E78.00 HYPERCHOLESTEROLEMIA: ICD-10-CM

## 2023-10-31 DIAGNOSIS — R73.01 ABNORMAL FASTING GLUCOSE: ICD-10-CM

## 2023-10-31 DIAGNOSIS — D69.6 THROMBOCYTOPENIA (HCC): ICD-10-CM

## 2023-10-31 DIAGNOSIS — D64.9 ANEMIA, UNSPECIFIED TYPE: ICD-10-CM

## 2023-10-31 DIAGNOSIS — K75.4 AUTOIMMUNE HEPATITIS (HCC): ICD-10-CM

## 2023-10-31 DIAGNOSIS — N18.31 STAGE 3A CHRONIC KIDNEY DISEASE (HCC): ICD-10-CM

## 2023-10-31 LAB
ALBUMIN SERPL-MCNC: 3.8 G/DL (ref 3.4–5)
ALP LIVER SERPL-CCNC: 70 U/L
ALT SERPL-CCNC: 20 U/L
ANION GAP SERPL CALC-SCNC: 6 MMOL/L (ref 0–18)
AST SERPL-CCNC: 16 U/L (ref 15–37)
BASOPHILS # BLD AUTO: 0.04 X10(3) UL (ref 0–0.2)
BASOPHILS NFR BLD AUTO: 0.8 %
BILIRUB DIRECT SERPL-MCNC: 0.3 MG/DL (ref 0–0.2)
BILIRUB SERPL-MCNC: 1.5 MG/DL (ref 0.1–2)
BUN BLD-MCNC: 19 MG/DL (ref 9–23)
CALCIUM BLD-MCNC: 9.3 MG/DL (ref 8.5–10.1)
CHLORIDE SERPL-SCNC: 104 MMOL/L (ref 98–112)
CHOLEST SERPL-MCNC: 176 MG/DL (ref ?–200)
CO2 SERPL-SCNC: 29 MMOL/L (ref 21–32)
CREAT BLD-MCNC: 1.41 MG/DL
EGFRCR SERPLBLD CKD-EPI 2021: 50 ML/MIN/1.73M2 (ref 60–?)
EOSINOPHIL # BLD AUTO: 0.25 X10(3) UL (ref 0–0.7)
EOSINOPHIL NFR BLD AUTO: 4.9 %
ERYTHROCYTE [DISTWIDTH] IN BLOOD BY AUTOMATED COUNT: 13.2 %
EST. AVERAGE GLUCOSE BLD GHB EST-MCNC: 120 MG/DL (ref 68–126)
FASTING PATIENT LIPID ANSWER: YES
FASTING STATUS PATIENT QL REPORTED: YES
GLUCOSE BLD-MCNC: 99 MG/DL (ref 70–99)
HBA1C MFR BLD: 5.8 % (ref ?–5.7)
HCT VFR BLD AUTO: 43.9 %
HDLC SERPL-MCNC: 65 MG/DL (ref 40–59)
HGB BLD-MCNC: 15 G/DL
IMM GRANULOCYTES # BLD AUTO: 0.02 X10(3) UL (ref 0–1)
IMM GRANULOCYTES NFR BLD: 0.4 %
LDLC SERPL CALC-MCNC: 99 MG/DL (ref ?–100)
LYMPHOCYTES # BLD AUTO: 1.35 X10(3) UL (ref 1–4)
LYMPHOCYTES NFR BLD AUTO: 26.4 %
MCH RBC QN AUTO: 30.8 PG (ref 26–34)
MCHC RBC AUTO-ENTMCNC: 34.2 G/DL (ref 31–37)
MCV RBC AUTO: 90.1 FL
MONOCYTES # BLD AUTO: 0.5 X10(3) UL (ref 0.1–1)
MONOCYTES NFR BLD AUTO: 9.8 %
NEUTROPHILS # BLD AUTO: 2.95 X10 (3) UL (ref 1.5–7.7)
NEUTROPHILS # BLD AUTO: 2.95 X10(3) UL (ref 1.5–7.7)
NEUTROPHILS NFR BLD AUTO: 57.7 %
NONHDLC SERPL-MCNC: 111 MG/DL (ref ?–130)
OSMOLALITY SERPL CALC.SUM OF ELEC: 290 MOSM/KG (ref 275–295)
PLATELET # BLD AUTO: 119 10(3)UL (ref 150–450)
POTASSIUM SERPL-SCNC: 4.5 MMOL/L (ref 3.5–5.1)
PROT SERPL-MCNC: 6.8 G/DL (ref 6.4–8.2)
RBC # BLD AUTO: 4.87 X10(6)UL
SODIUM SERPL-SCNC: 139 MMOL/L (ref 136–145)
TRIGL SERPL-MCNC: 60 MG/DL (ref 30–149)
VLDLC SERPL CALC-MCNC: 10 MG/DL (ref 0–30)
WBC # BLD AUTO: 5.1 X10(3) UL (ref 4–11)

## 2023-10-31 PROCEDURE — 80061 LIPID PANEL: CPT

## 2023-10-31 PROCEDURE — 80048 BASIC METABOLIC PNL TOTAL CA: CPT

## 2023-10-31 PROCEDURE — 83036 HEMOGLOBIN GLYCOSYLATED A1C: CPT

## 2023-10-31 PROCEDURE — 85025 COMPLETE CBC W/AUTO DIFF WBC: CPT

## 2023-10-31 PROCEDURE — 80076 HEPATIC FUNCTION PANEL: CPT

## 2023-11-09 ENCOUNTER — OFFICE VISIT (OUTPATIENT)
Dept: INTERNAL MEDICINE CLINIC | Facility: CLINIC | Age: 83
End: 2023-11-09

## 2023-11-09 VITALS
DIASTOLIC BLOOD PRESSURE: 70 MMHG | SYSTOLIC BLOOD PRESSURE: 120 MMHG | OXYGEN SATURATION: 99 % | BODY MASS INDEX: 28.69 KG/M2 | WEIGHT: 211.81 LBS | HEART RATE: 64 BPM | TEMPERATURE: 98 F | HEIGHT: 72 IN

## 2023-11-09 DIAGNOSIS — D64.9 ANEMIA, UNSPECIFIED TYPE: ICD-10-CM

## 2023-11-09 DIAGNOSIS — D69.6 THROMBOCYTOPENIA (HCC): ICD-10-CM

## 2023-11-09 DIAGNOSIS — E03.9 HYPOTHYROIDISM, UNSPECIFIED TYPE: ICD-10-CM

## 2023-11-09 DIAGNOSIS — R53.83 FATIGUE, UNSPECIFIED TYPE: ICD-10-CM

## 2023-11-09 DIAGNOSIS — I67.1 CEREBRAL ANEURYSM: ICD-10-CM

## 2023-11-09 DIAGNOSIS — I48.0 PAROXYSMAL ATRIAL FIBRILLATION (HCC): ICD-10-CM

## 2023-11-09 DIAGNOSIS — K75.4 AUTOIMMUNE HEPATITIS (HCC): ICD-10-CM

## 2023-11-09 DIAGNOSIS — N18.31 STAGE 3A CHRONIC KIDNEY DISEASE (HCC): ICD-10-CM

## 2023-11-09 DIAGNOSIS — M15.9 PRIMARY OSTEOARTHRITIS INVOLVING MULTIPLE JOINTS: ICD-10-CM

## 2023-11-09 DIAGNOSIS — R73.01 ABNORMAL FASTING GLUCOSE: ICD-10-CM

## 2023-11-09 DIAGNOSIS — I10 ESSENTIAL HYPERTENSION: Primary | ICD-10-CM

## 2023-11-09 DIAGNOSIS — E78.00 HYPERCHOLESTEROLEMIA: ICD-10-CM

## 2023-11-09 PROCEDURE — 1126F AMNT PAIN NOTED NONE PRSNT: CPT | Performed by: INTERNAL MEDICINE

## 2023-11-09 PROCEDURE — 3074F SYST BP LT 130 MM HG: CPT | Performed by: INTERNAL MEDICINE

## 2023-11-09 PROCEDURE — 3008F BODY MASS INDEX DOCD: CPT | Performed by: INTERNAL MEDICINE

## 2023-11-09 PROCEDURE — 3078F DIAST BP <80 MM HG: CPT | Performed by: INTERNAL MEDICINE

## 2023-11-09 PROCEDURE — 1159F MED LIST DOCD IN RCRD: CPT | Performed by: INTERNAL MEDICINE

## 2023-11-09 PROCEDURE — 1160F RVW MEDS BY RX/DR IN RCRD: CPT | Performed by: INTERNAL MEDICINE

## 2023-11-09 PROCEDURE — 99214 OFFICE O/P EST MOD 30 MIN: CPT | Performed by: INTERNAL MEDICINE

## 2023-11-09 NOTE — PATIENT INSTRUCTIONS
Pt is to continue his current diet, medication and activity. Patient has received his flu vaccine and his RSV vaccine. Patient should obtain his COVID-vaccine. I will see the patient back in about 3 months with blood test which will include a CBC, BMP, hemoglobin A1c, lipid panel and hepatic function panel.   I will see the patient back sooner as necessary

## 2024-02-06 ENCOUNTER — LAB ENCOUNTER (OUTPATIENT)
Dept: LAB | Age: 84
End: 2024-02-06
Attending: INTERNAL MEDICINE
Payer: MEDICARE

## 2024-02-06 DIAGNOSIS — E78.00 HYPERCHOLESTEROLEMIA: ICD-10-CM

## 2024-02-06 DIAGNOSIS — R73.01 ABNORMAL FASTING GLUCOSE: ICD-10-CM

## 2024-02-06 DIAGNOSIS — D69.6 THROMBOCYTOPENIA (HCC): ICD-10-CM

## 2024-02-06 DIAGNOSIS — N18.31 STAGE 3A CHRONIC KIDNEY DISEASE (HCC): ICD-10-CM

## 2024-02-06 DIAGNOSIS — K75.4 AUTOIMMUNE HEPATITIS (HCC): ICD-10-CM

## 2024-02-06 LAB
ALBUMIN SERPL-MCNC: 3.8 G/DL (ref 3.4–5)
ALP LIVER SERPL-CCNC: 70 U/L
ALT SERPL-CCNC: 21 U/L
ANION GAP SERPL CALC-SCNC: 3 MMOL/L (ref 0–18)
AST SERPL-CCNC: 15 U/L (ref 15–37)
BASOPHILS # BLD AUTO: 0.05 X10(3) UL (ref 0–0.2)
BASOPHILS NFR BLD AUTO: 0.9 %
BILIRUB DIRECT SERPL-MCNC: 0.2 MG/DL (ref 0–0.2)
BILIRUB SERPL-MCNC: 1.1 MG/DL (ref 0.1–2)
BUN BLD-MCNC: 21 MG/DL (ref 9–23)
CALCIUM BLD-MCNC: 9.2 MG/DL (ref 8.5–10.1)
CHLORIDE SERPL-SCNC: 109 MMOL/L (ref 98–112)
CHOLEST SERPL-MCNC: 180 MG/DL (ref ?–200)
CO2 SERPL-SCNC: 27 MMOL/L (ref 21–32)
CREAT BLD-MCNC: 1.29 MG/DL
EGFRCR SERPLBLD CKD-EPI 2021: 55 ML/MIN/1.73M2 (ref 60–?)
EOSINOPHIL # BLD AUTO: 0.27 X10(3) UL (ref 0–0.7)
EOSINOPHIL NFR BLD AUTO: 4.8 %
ERYTHROCYTE [DISTWIDTH] IN BLOOD BY AUTOMATED COUNT: 13.4 %
EST. AVERAGE GLUCOSE BLD GHB EST-MCNC: 117 MG/DL (ref 68–126)
FASTING PATIENT LIPID ANSWER: YES
FASTING STATUS PATIENT QL REPORTED: YES
GLUCOSE BLD-MCNC: 96 MG/DL (ref 70–99)
HBA1C MFR BLD: 5.7 % (ref ?–5.7)
HCT VFR BLD AUTO: 44.5 %
HDLC SERPL-MCNC: 68 MG/DL (ref 40–59)
HGB BLD-MCNC: 15.1 G/DL
IMM GRANULOCYTES # BLD AUTO: 0.01 X10(3) UL (ref 0–1)
IMM GRANULOCYTES NFR BLD: 0.2 %
LDLC SERPL CALC-MCNC: 100 MG/DL (ref ?–100)
LYMPHOCYTES # BLD AUTO: 1.49 X10(3) UL (ref 1–4)
LYMPHOCYTES NFR BLD AUTO: 26.6 %
MCH RBC QN AUTO: 30.6 PG (ref 26–34)
MCHC RBC AUTO-ENTMCNC: 33.9 G/DL (ref 31–37)
MCV RBC AUTO: 90.1 FL
MONOCYTES # BLD AUTO: 0.44 X10(3) UL (ref 0.1–1)
MONOCYTES NFR BLD AUTO: 7.8 %
NEUTROPHILS # BLD AUTO: 3.35 X10 (3) UL (ref 1.5–7.7)
NEUTROPHILS # BLD AUTO: 3.35 X10(3) UL (ref 1.5–7.7)
NEUTROPHILS NFR BLD AUTO: 59.7 %
NONHDLC SERPL-MCNC: 112 MG/DL (ref ?–130)
OSMOLALITY SERPL CALC.SUM OF ELEC: 291 MOSM/KG (ref 275–295)
PLATELET # BLD AUTO: 129 10(3)UL (ref 150–450)
POTASSIUM SERPL-SCNC: 4.1 MMOL/L (ref 3.5–5.1)
PROT SERPL-MCNC: 6.9 G/DL (ref 6.4–8.2)
RBC # BLD AUTO: 4.94 X10(6)UL
SODIUM SERPL-SCNC: 139 MMOL/L (ref 136–145)
TRIGL SERPL-MCNC: 62 MG/DL (ref 30–149)
VLDLC SERPL CALC-MCNC: 10 MG/DL (ref 0–30)
WBC # BLD AUTO: 5.6 X10(3) UL (ref 4–11)

## 2024-02-06 PROCEDURE — 80061 LIPID PANEL: CPT

## 2024-02-06 PROCEDURE — 80076 HEPATIC FUNCTION PANEL: CPT

## 2024-02-06 PROCEDURE — 85025 COMPLETE CBC W/AUTO DIFF WBC: CPT

## 2024-02-06 PROCEDURE — 80048 BASIC METABOLIC PNL TOTAL CA: CPT

## 2024-02-06 PROCEDURE — 83036 HEMOGLOBIN GLYCOSYLATED A1C: CPT

## 2024-02-12 ENCOUNTER — OFFICE VISIT (OUTPATIENT)
Dept: INTERNAL MEDICINE CLINIC | Facility: CLINIC | Age: 84
End: 2024-02-12

## 2024-02-12 VITALS
HEART RATE: 60 BPM | OXYGEN SATURATION: 96 % | SYSTOLIC BLOOD PRESSURE: 130 MMHG | TEMPERATURE: 98 F | BODY MASS INDEX: 28.76 KG/M2 | DIASTOLIC BLOOD PRESSURE: 70 MMHG | WEIGHT: 212.38 LBS | HEIGHT: 72 IN

## 2024-02-12 DIAGNOSIS — I48.0 PAROXYSMAL ATRIAL FIBRILLATION (HCC): ICD-10-CM

## 2024-02-12 DIAGNOSIS — D69.6 THROMBOCYTOPENIA (HCC): ICD-10-CM

## 2024-02-12 DIAGNOSIS — E78.00 HYPERCHOLESTEROLEMIA: ICD-10-CM

## 2024-02-12 DIAGNOSIS — M15.9 PRIMARY OSTEOARTHRITIS INVOLVING MULTIPLE JOINTS: ICD-10-CM

## 2024-02-12 DIAGNOSIS — I67.1 CEREBRAL ANEURYSM: ICD-10-CM

## 2024-02-12 DIAGNOSIS — Z00.00 ANNUAL PHYSICAL EXAM: ICD-10-CM

## 2024-02-12 DIAGNOSIS — K75.4 AUTOIMMUNE HEPATITIS (HCC): ICD-10-CM

## 2024-02-12 DIAGNOSIS — D64.9 ANEMIA, UNSPECIFIED TYPE: ICD-10-CM

## 2024-02-12 DIAGNOSIS — N18.31 STAGE 3A CHRONIC KIDNEY DISEASE (HCC): ICD-10-CM

## 2024-02-12 DIAGNOSIS — E03.9 HYPOTHYROIDISM, UNSPECIFIED TYPE: ICD-10-CM

## 2024-02-12 DIAGNOSIS — R73.01 ABNORMAL FASTING GLUCOSE: ICD-10-CM

## 2024-02-12 DIAGNOSIS — R53.83 FATIGUE, UNSPECIFIED TYPE: ICD-10-CM

## 2024-02-12 DIAGNOSIS — I10 ESSENTIAL HYPERTENSION: Primary | ICD-10-CM

## 2024-02-12 NOTE — PATIENT INSTRUCTIONS
Patient appears to be doing well at this time.  Patient has received his flu vaccine, COVID-vaccine and RSV vaccine.  I will plan to see the patient back in about 3 months with blood test, urinalysis and EKG for his annual physical examination.  Patient's blood test will include a CBC, CMP, lipid panel, TSH and urinalysis.  I will see the patient back sooner as necessary.

## 2024-02-12 NOTE — PROGRESS NOTES
Miguel A Finley is a 83 year old male.  Chief Complaint   Patient presents with    Checkup     3 month    Hypertension    Hyperlipidemia    Arthritis     HPI:     Chief Complaint   Patient presents with    Checkup     3 month    Hypertension    Hyperlipidemia    Arthritis     Pt feels great!  He has checked his blood tests which have turned out well.  Pt feels that he feels more fatigued than before.  Pt is still active.  Pt feels that he is \"getting older\".  Patient appears to have him improved and gotten over what ever caused him to get severely ill about 3 to 4 years ago.  The liver abnormalities, kidney insufficiency, anemia and fatigue have all improved markedly.  At that time no definitive diagnosis was obtained.  Patient was checked out for multiple viruses which for the most part came back negative.  His liver specialist now feels that it is possible that COVID could have caused this problem.  Patient feels well today.  Current Outpatient Medications   Medication Sig Dispense Refill    metoprolol succinate ER 25 MG Oral Tablet 24 Hr Take 1 tablet (25 mg total) by mouth nightly. 90 tablet 3    levothyroxine (SYNTHROID) 88 MCG Oral Tab Take 1 tablet (88 mcg total) by mouth before breakfast. 90 tablet 3    Cholecalciferol 50 MCG (2000 UT) Oral Cap Take 2,000 Units by mouth daily.      aspirin 81 MG Oral Tab Take 1 tablet (81 mg total) by mouth daily.        Past Medical History:   Diagnosis Date    Arthritis 2013    MRI on right ankle    Borderline high blood pressure 2009    Borderline high cholesterol     BPH (benign prostatic hyperplasia) 2009    Diverticulosis     Elevated liver enzymes     Enlarged prostate 2009    Essential hypertension     Hearing impairment     High blood pressure     Hypercholesterolemia 03/07/2011    Left axis deviation 2002    Prostate enlargement 03/06/2014    Surgery to reduce his prostate    Seizure (HCC) 11/06/2020    Visual impairment       Social History:  Social History      Socioeconomic History    Marital status:    Tobacco Use    Smoking status: Former     Packs/day: 0.00     Years: 0.00     Additional pack years: 0.00     Total pack years: 0.00     Types: Pipe, Cigarettes     Quit date: 6/15/1969     Years since quittin.6    Smokeless tobacco: Never   Vaping Use    Vaping Use: Never used   Substance and Sexual Activity    Alcohol use: Yes     Alcohol/week: 0.0 standard drinks of alcohol     Comment: Rarely    Drug use: No   Other Topics Concern    Caffeine Concern Yes     Comment: Coffee 3-4 cups daily; Soda 1 can daily        REVIEW OF SYSTEMS:   GENERAL HEALTH: feels well otherwise  RESPIRATORY:No cough or SOB  CARDIOVASCULAR: No chest pain  GI: No abdominal pain, nausea, vomiting, diarrhea, or constipation  :No Urinary complaints  EXT:No complaints of pain or swelling in patient's legs    EXAM:   /70 (BP Location: Right arm, Patient Position: Sitting, Cuff Size: large)   Pulse 60   Temp 97.9 °F (36.6 °C)   Ht 6' (1.829 m)   Wt 212 lb 6.4 oz (96.3 kg)   SpO2 96%   BMI 28.81 kg/m²   GENERAL: well developed, well nourished in no acute distress  HEENT: normal oropharynx, normal TM's. Ears are normal. Eyes are normal  NECK: supple,no lymphadenopathy or masses, no bruits  CHEST: Well-developed male.  LUNGS: clear to auscultation  CARDIO: RRR, normal S1S2, without murmur   GI:Protuberant, BS are present, no organomegaly or palpable masses  EXTREMITIES: no edema  NEURO: alert and oriented  ASSESSMENT AND PLAN:   1. Essential hypertension  Patient is doing well.  Patient is to continue his current diet, medication and activity.  Patient appears to have \"gotten over\" what ever he had about 3 or 4 years ago that caused multiple medical issues for the patient.  At that time a definitive diagnosis was not made but patient did gradually improved.  It has been suggested by one of his consultants that he possibly had COVID as the cause of the multiple issues.   Patient is doing well and appears to be over that.  Patient is to continue his current diet, medication and activity.  Patient has received his flu vaccine, COVID-vaccine and RSV vaccine.  I will plan to see the patient back in about 3 months with blood test, urinalysis and EKG for his annual physical examination.  The blood test will include a CBC, CMP, lipid panel, TSH and urinalysis.  I will see the patient back sooner as necessary.    2. Hypercholesterolemia  Doing well.  CPM.  Patient's recent lipid panel had a cholesterol of 180, triglycerides were 62, HDL cholesterol 68 and LDL cholesterol was 100.  Patient's AST is 15 and ALT is 21.  CPM.    3. Anemia, unspecified type  Patient's difficulty with the anemia appears to have resolved.  Patient's recent CBC shows his hemoglobin to be 15.1, hematocrit 44.5, WBC to be 5600 and platelet count to be 129,000.  Patient's blood count is stable.  Patient has had a thrombocytopenia for some time.    4. Thrombocytopenia (HCC)  Stable.  CPM.  Patient's platelet counts 129,000.  This is similar to where his platelets have been running for some time.  CPM.    5. Fatigue, unspecified type  Doing well.  CPM.  Patient has regained much of his strength.  Patient aware feels that he does tire more easily than before which he attributes to his age.  Patient has no complaints of chest pain or shortness of breath.    6. Stage 3a chronic kidney disease (HCC)  Patient's chronic kidney disease has stabilized.  It is improved from before.  Patient's recent BUN was 21, creatinine is 1.29 and GFR was 55.  CPM.    7. Primary osteoarthritis involving multiple joints  Stable.  CPM.    8. Hypothyroidism, unspecified type  Stable.  CPM.    9. Autoimmune hepatitis (HCC)  Patient's bout with hepatitis appears to have resolved.  Patient's liver function studies are normal.    10. Cerebral aneurysm  Patient was found to have a small cerebral aneurysm.  Patient was followed at [facility] for this.   Patient sees a neurosurgeon for follow-up evaluation.  Patient saw the neurosurgeon last summer at which time the aneurysm was \"unchanged.    11. Abnormal fasting glucose  Doing well.  CPM.  Patient's recent FBS was 96.    12. Paroxysmal atrial fibrillation (HCC)  Doing well.  Patient has had no recurrence of his prior issue with atrial fibrillation which occurred when he was quite ill 3 to 4 years ago.      The patient indicates understanding of these issues and agrees to the plan.  The patient is asked to return in 3 months with blood test, urinalysis and EKG for his annual physical examination..    Chad Beth MD  2/12/2024  9:50 AM

## 2024-04-30 ENCOUNTER — LAB ENCOUNTER (OUTPATIENT)
Dept: LAB | Age: 84
End: 2024-04-30
Attending: INTERNAL MEDICINE
Payer: MEDICARE

## 2024-04-30 DIAGNOSIS — E78.00 HYPERCHOLESTEROLEMIA: ICD-10-CM

## 2024-04-30 DIAGNOSIS — I10 ESSENTIAL HYPERTENSION: ICD-10-CM

## 2024-04-30 DIAGNOSIS — D64.9 ANEMIA, UNSPECIFIED TYPE: ICD-10-CM

## 2024-04-30 DIAGNOSIS — N18.31 STAGE 3A CHRONIC KIDNEY DISEASE (HCC): ICD-10-CM

## 2024-04-30 DIAGNOSIS — K75.4 AUTOIMMUNE HEPATITIS (HCC): ICD-10-CM

## 2024-04-30 DIAGNOSIS — R53.83 FATIGUE, UNSPECIFIED TYPE: ICD-10-CM

## 2024-04-30 DIAGNOSIS — D69.6 THROMBOCYTOPENIA (HCC): ICD-10-CM

## 2024-04-30 DIAGNOSIS — Z00.00 ANNUAL PHYSICAL EXAM: ICD-10-CM

## 2024-04-30 LAB
ALBUMIN SERPL-MCNC: 3.5 G/DL (ref 3.4–5)
ALBUMIN/GLOB SERPL: 1.3 {RATIO} (ref 1–2)
ALP LIVER SERPL-CCNC: 72 U/L
ALT SERPL-CCNC: 21 U/L
ANION GAP SERPL CALC-SCNC: 3 MMOL/L (ref 0–18)
AST SERPL-CCNC: 14 U/L (ref 15–37)
BASOPHILS # BLD AUTO: 0.04 X10(3) UL (ref 0–0.2)
BASOPHILS NFR BLD AUTO: 0.8 %
BILIRUB SERPL-MCNC: 1.2 MG/DL (ref 0.1–2)
BILIRUB UR QL STRIP.AUTO: NEGATIVE
BUN BLD-MCNC: 18 MG/DL (ref 9–23)
CALCIUM BLD-MCNC: 8.5 MG/DL (ref 8.5–10.1)
CHLORIDE SERPL-SCNC: 99 MMOL/L (ref 98–112)
CHOLEST SERPL-MCNC: 172 MG/DL (ref ?–200)
CLARITY UR REFRACT.AUTO: CLEAR
CO2 SERPL-SCNC: 28 MMOL/L (ref 21–32)
CREAT BLD-MCNC: 1.37 MG/DL
EGFRCR SERPLBLD CKD-EPI 2021: 52 ML/MIN/1.73M2 (ref 60–?)
EOSINOPHIL # BLD AUTO: 0.3 X10(3) UL (ref 0–0.7)
EOSINOPHIL NFR BLD AUTO: 5.7 %
ERYTHROCYTE [DISTWIDTH] IN BLOOD BY AUTOMATED COUNT: 13.2 %
FASTING PATIENT LIPID ANSWER: YES
FASTING STATUS PATIENT QL REPORTED: YES
GLOBULIN PLAS-MCNC: 2.6 G/DL (ref 2.8–4.4)
GLUCOSE BLD-MCNC: 96 MG/DL (ref 70–99)
GLUCOSE UR STRIP.AUTO-MCNC: NORMAL MG/DL
HCT VFR BLD AUTO: 43.6 %
HDLC SERPL-MCNC: 62 MG/DL (ref 40–59)
HGB BLD-MCNC: 14.4 G/DL
IMM GRANULOCYTES # BLD AUTO: 0.01 X10(3) UL (ref 0–1)
IMM GRANULOCYTES NFR BLD: 0.2 %
KETONES UR STRIP.AUTO-MCNC: NEGATIVE MG/DL
LDLC SERPL CALC-MCNC: 99 MG/DL (ref ?–100)
LEUKOCYTE ESTERASE UR QL STRIP.AUTO: NEGATIVE
LYMPHOCYTES # BLD AUTO: 1.33 X10(3) UL (ref 1–4)
LYMPHOCYTES NFR BLD AUTO: 25.1 %
MCH RBC QN AUTO: 30.7 PG (ref 26–34)
MCHC RBC AUTO-ENTMCNC: 33 G/DL (ref 31–37)
MCV RBC AUTO: 93 FL
MONOCYTES # BLD AUTO: 0.53 X10(3) UL (ref 0.1–1)
MONOCYTES NFR BLD AUTO: 10 %
NEUTROPHILS # BLD AUTO: 3.09 X10 (3) UL (ref 1.5–7.7)
NEUTROPHILS # BLD AUTO: 3.09 X10(3) UL (ref 1.5–7.7)
NEUTROPHILS NFR BLD AUTO: 58.2 %
NITRITE UR QL STRIP.AUTO: NEGATIVE
NONHDLC SERPL-MCNC: 110 MG/DL (ref ?–130)
OSMOLALITY SERPL CALC.SUM OF ELEC: 272 MOSM/KG (ref 275–295)
PH UR STRIP.AUTO: 6.5 [PH] (ref 5–8)
PLATELET # BLD AUTO: 123 10(3)UL (ref 150–450)
POTASSIUM SERPL-SCNC: 4.4 MMOL/L (ref 3.5–5.1)
PROT SERPL-MCNC: 6.1 G/DL (ref 6.4–8.2)
PROT UR STRIP.AUTO-MCNC: NEGATIVE MG/DL
RBC # BLD AUTO: 4.69 X10(6)UL
RBC UR QL AUTO: NEGATIVE
SODIUM SERPL-SCNC: 130 MMOL/L (ref 136–145)
SP GR UR STRIP.AUTO: 1.01 (ref 1–1.03)
TRIGL SERPL-MCNC: 58 MG/DL (ref 30–149)
TSI SER-ACNC: 1.53 MIU/ML (ref 0.36–3.74)
UROBILINOGEN UR STRIP.AUTO-MCNC: NORMAL MG/DL
VLDLC SERPL CALC-MCNC: 10 MG/DL (ref 0–30)
WBC # BLD AUTO: 5.3 X10(3) UL (ref 4–11)

## 2024-04-30 PROCEDURE — 85025 COMPLETE CBC W/AUTO DIFF WBC: CPT

## 2024-04-30 PROCEDURE — 84443 ASSAY THYROID STIM HORMONE: CPT

## 2024-04-30 PROCEDURE — 80053 COMPREHEN METABOLIC PANEL: CPT

## 2024-04-30 PROCEDURE — 81003 URINALYSIS AUTO W/O SCOPE: CPT | Performed by: INTERNAL MEDICINE

## 2024-04-30 PROCEDURE — 80061 LIPID PANEL: CPT

## 2024-05-08 ENCOUNTER — OFFICE VISIT (OUTPATIENT)
Dept: INTERNAL MEDICINE CLINIC | Facility: CLINIC | Age: 84
End: 2024-05-08

## 2024-05-08 VITALS
DIASTOLIC BLOOD PRESSURE: 70 MMHG | OXYGEN SATURATION: 97 % | HEART RATE: 72 BPM | WEIGHT: 212.38 LBS | BODY MASS INDEX: 28.76 KG/M2 | TEMPERATURE: 98 F | HEIGHT: 72 IN | SYSTOLIC BLOOD PRESSURE: 130 MMHG

## 2024-05-08 DIAGNOSIS — I48.0 PAROXYSMAL ATRIAL FIBRILLATION (HCC): ICD-10-CM

## 2024-05-08 DIAGNOSIS — I10 ESSENTIAL HYPERTENSION: ICD-10-CM

## 2024-05-08 DIAGNOSIS — Z00.00 ANNUAL PHYSICAL EXAM: Primary | ICD-10-CM

## 2024-05-08 DIAGNOSIS — E87.1 HYPONATREMIA: ICD-10-CM

## 2024-05-08 DIAGNOSIS — N18.31 STAGE 3A CHRONIC KIDNEY DISEASE (HCC): ICD-10-CM

## 2024-05-08 DIAGNOSIS — E78.00 HYPERCHOLESTEROLEMIA: ICD-10-CM

## 2024-05-08 DIAGNOSIS — I67.1 CEREBRAL ANEURYSM (HCC): ICD-10-CM

## 2024-05-08 DIAGNOSIS — D69.6 THROMBOCYTOPENIA (HCC): ICD-10-CM

## 2024-05-08 DIAGNOSIS — K75.4 AUTOIMMUNE HEPATITIS (HCC): ICD-10-CM

## 2024-05-08 DIAGNOSIS — M15.9 PRIMARY OSTEOARTHRITIS INVOLVING MULTIPLE JOINTS: ICD-10-CM

## 2024-05-08 DIAGNOSIS — R53.83 FATIGUE, UNSPECIFIED TYPE: ICD-10-CM

## 2024-05-08 DIAGNOSIS — E03.9 HYPOTHYROIDISM, UNSPECIFIED TYPE: ICD-10-CM

## 2024-05-08 LAB
ATRIAL RATE: 71 BPM
P AXIS: 67 DEGREES
P-R INTERVAL: 188 MS
Q-T INTERVAL: 408 MS
QRS DURATION: 98 MS
QTC CALCULATION (BEZET): 443 MS
R AXIS: -52 DEGREES
T AXIS: 62 DEGREES
VENTRICULAR RATE: 71 BPM

## 2024-05-08 PROCEDURE — 1159F MED LIST DOCD IN RCRD: CPT | Performed by: INTERNAL MEDICINE

## 2024-05-08 PROCEDURE — 3008F BODY MASS INDEX DOCD: CPT | Performed by: INTERNAL MEDICINE

## 2024-05-08 PROCEDURE — 3078F DIAST BP <80 MM HG: CPT | Performed by: INTERNAL MEDICINE

## 2024-05-08 PROCEDURE — 93000 ELECTROCARDIOGRAM COMPLETE: CPT | Performed by: INTERNAL MEDICINE

## 2024-05-08 PROCEDURE — 96160 PT-FOCUSED HLTH RISK ASSMT: CPT | Performed by: INTERNAL MEDICINE

## 2024-05-08 PROCEDURE — G0439 PPPS, SUBSEQ VISIT: HCPCS | Performed by: INTERNAL MEDICINE

## 2024-05-08 PROCEDURE — 3075F SYST BP GE 130 - 139MM HG: CPT | Performed by: INTERNAL MEDICINE

## 2024-05-08 PROCEDURE — 1170F FXNL STATUS ASSESSED: CPT | Performed by: INTERNAL MEDICINE

## 2024-05-08 PROCEDURE — 99214 OFFICE O/P EST MOD 30 MIN: CPT | Performed by: INTERNAL MEDICINE

## 2024-05-08 PROCEDURE — 1160F RVW MEDS BY RX/DR IN RCRD: CPT | Performed by: INTERNAL MEDICINE

## 2024-05-08 NOTE — PATIENT INSTRUCTIONS
Patient is to continue his current diet, medication and activity.  Patient is to have a repeat BMP done in about 3 to 4 weeks.  Patient to call me for the results of it is never me within a few days of the test.  Patient will plan to see Dr. Barragan in about 6 months.  I will place orders in the system for a CBC, BMP and hepatic function panel to be done prior to seeing Dr. Arguello.  I will put these orders in the system after the above BMP is done.  Patient should get his flu vaccine and COVID-vaccine this autumn, in October or November.  Patient may consider getting a Prevnar 20 vaccine for pneumonia later this year also.

## 2024-05-08 NOTE — PROGRESS NOTES
Miguel A Finley is a 83 year old male who was seen by me on May 8, 2024 for his Medicare advantage annual physical examination.  HPI:   Mr. Miguel A Finley is an 83-year-old white male who was seen by me on May 8, 2024 for his Medicare advantage annual physical examination.  At the time of examination Mr. Finley was feeling great!!  He does not have as much energy as he had when he was younger but he notices that his wife is slowing down to him.  Patient is active at home and does does doing outside yard work.  Patient has no complaints of chest pain or shortness of breath.  In addition he has no complaints of headache or stomach issues.  He does have occasional constipation for which he takes Citrucel in 8-10 ounces of water twice a day.  Patient feels he is doing well.  Patient is wife will be leaving later today to drive to [place] to visit their son and his son's family.    Wt Readings from Last 6 Encounters:   05/08/24 212 lb 6.4 oz (96.3 kg)   02/12/24 212 lb 6.4 oz (96.3 kg)   11/09/23 211 lb 12.8 oz (96.1 kg)   08/22/23 212 lb (96.2 kg)   08/09/23 207 lb 12.8 oz (94.3 kg)   02/09/23 211 lb (95.7 kg)     Body mass index is 28.81 kg/m².     Current Outpatient Medications   Medication Sig Dispense Refill    metoprolol succinate ER 25 MG Oral Tablet 24 Hr Take 1 tablet (25 mg total) by mouth nightly. 90 tablet 3    levothyroxine (SYNTHROID) 88 MCG Oral Tab Take 1 tablet (88 mcg total) by mouth before breakfast. 90 tablet 3    Cholecalciferol 50 MCG (2000 UT) Oral Cap Take 2,000 Units by mouth daily.      aspirin 81 MG Oral Tab Take 1 tablet (81 mg total) by mouth daily.        Past Medical History:    Arthritis    MRI on right ankle    Borderline high blood pressure    Borderline high cholesterol    BPH (benign prostatic hyperplasia)    Diverticulosis    Elevated liver enzymes    Enlarged prostate    Essential hypertension    Hearing impairment    High blood pressure    Hypercholesterolemia    Left  axis deviation    Prostate enlargement    Surgery to reduce his prostate    Seizure (HCC)    Visual impairment      Past Surgical History:   Procedure Laterality Date    Colonoscopy N/A 3/17/2022    Procedure: COLONOSCOPY;  Surgeon: Kvng Morrow MD;  Location: [facility] ENDOSCOPY    Colonoscopy  2022    Contact laser surgery of prostate      cystoscopy w/ Green Light laser ablation of prostate     Dil urethra melvin,male,initial  2014    Dialate Urethra    Foot surgery  2014    Right Ankle Surgery    Other surgical history  2014    Right Ankle Tendon Repair      Family History   Problem Relation Age of Onset    Dementia Mother     Heart Disease Mother         CAD    Heart Disease Sister     Heart Disease Father         CAD + ASHD    Renal Disease Father     Blood Disorder Brother         blood clot in leg    Cancer Brother         Prostate cancer in remission    Cancer Brother     Diabetes Brother     Hypertension Brother     Heart Disorder Sister       Social History:  Social History     Socioeconomic History    Marital status:    Tobacco Use    Smoking status: Former     Current packs/day: 0.00     Types: Pipe, Cigarettes     Quit date: 6/15/1969     Years since quittin.9    Smokeless tobacco: Never   Vaping Use    Vaping status: Never Used   Substance and Sexual Activity    Alcohol use: Not Currently     Comment: Rarely    Drug use: No   Other Topics Concern    Caffeine Concern Yes     Comment: Coffee 3-4 cups daily; Soda 1 can daily     Social Determinants of Health     Financial Resource Strain: Low Risk  (12/10/2021)    Received from [facility]    Overall Financial Resource Strain (CARDIA)     Difficulty of Paying Living Expenses: Not hard at all   Transportation Needs: No Transportation Needs (12/10/2021)    Received from [facility]    PRAPARE - Transportation     Lack of Transportation (Medical): No     Lack of  Transportation (Non-Medical): No    [facility]    Social Connections    [facility]    Housing Stability           REVIEW OF SYSTEMS:   GENERAL: feels well   EYES:denies blurred vision or double vision  HEENT: denies nasal congestion, sinus pain or ST  LUNGS: denies shortness of breath or cough  CARDIOVASCULAR: denies chest pain or pressure or palpitations  GI: denies abdominal pain, N/V, diarrhea, constipation, hematochezia or melena  : No urinary complaints  NEURO: denies headaches or dizziness    EXAM:   /70 (BP Location: Left arm, Patient Position: Sitting, Cuff Size: large)   Pulse 72   Temp 97.8 °F (36.6 °C)   Ht 6' (1.829 m)   Wt 212 lb 6.4 oz (96.3 kg)   SpO2 97%   BMI 28.81 kg/m²   GENERAL: well developed, well nourished,in no acute distress  SKIN: no rashes,no suspicious lesions  HEENT: atraumatic, normocephalic, normal oropharynx, ears appear normal, normal TM's  EYES:PERRLA, EOMI, conjunctivae pink, sclerae are nonicteric  NECK: supple,no cervical or supraclavicular lymphadenopathy or palpable masses,no carotid bruits  CHEST: no chest tenderness  LUNGS: clear to auscultation  CARDIO: RRR, normal S1S2, no murmurs  GI:Abdomen is protuberant, BS are present, no masses or organomegaly  :Normal male, No hernia noted  RECTAL:  Stool is brown and is negative for Occult blood.  Prostate is normal with no palpable nodules  MUSCULOSKELETAL: back is not tender.  No pain or swelling of legs  EXTREMITIES:No edema.  All peripheral pulses are intact.  NEURO:Alert and oriented, CN are intact, DTRs are 1+ Bilaterally.    EKG has an NSR at 71 bpm.  Its axis is a -52 degree angle.  EKG has a left axis deviation.  EKG has NSSTTWCs in lead aVL.  Patient's EKG is similar to patient's previous EKG.    Patient's CBC had a hemoglobin of 14.4, hematocrit 43.6 and WBC of 5300.   Patient's platelet count was 1 23,000 which is low but it is where the patient's platelet count has been running for some time.  Patient's CMP has a sodium of 130.  Patient's BUN is 18, creatinine is 1.37 and GFR is 52.  Patient's AST is 14 and ALT is 21.  Patient's total protein is 6.1.  Patient's globulins are slightly low at 2.6.  Patient's urinalysis was normal.  Patient's lipid panel is a cholesterol 172, triglycerides are 58, HDL cholesterol 62 and LDL cholesterol is 99.  Patient's TSH was normal at 1.530.    ASSESSMENT AND PLAN:   1. Annual physical exam  Patient appears to be doing well at this time.  Patient to continue his current diet, medication and activity.  Patient will have a repeat BMP in about 3 to 4 weeks, after he returns from [place], to check on his hyponatremia.  Patient will continue his medications, diet and activity.  I will plan to have patient see Dr. Barragan 6 months with blood test which will include a CBC, BMP and hepatic function panel.  I will place these orders in the system after the first BMP is performed.  I have encouraged the patient to get his flu vaccine and COVID-vaccine this yovana, in October or November.  Patient can also consider getting a Prevnar 20 vaccine in the future also.    - ELECTROCARDIOGRAM, COMPLETE    2. Essential hypertension  Patient's blood pressure is doing well.  CPM.  Patient is continue his metoprolol succinate 25 mg as he is doing.    3. Hypercholesterolemia  Doing well.  CPM.  Patient's recent lipid panel had a cholesterol 172, triglycerides were 58, HDL cholesterol was 62 and LDL cholesterol was 99.  Patient's cholesterol is doing well at this time.  Patient does not take any medications for this.    4. Thrombocytopenia (HCC)  Patient's recent CBC had a platelet count of 123,000.  This reading is similar to patient's previous readings and is in the range in which it runs.  Patient appears stable at this time.  Patient will return in 3 to 4  months with a repeat CBC as noted above.    5. Fatigue, unspecified type  Stable.  CPM.  Patient appears to be doing well at this time.    - ELECTROCARDIOGRAM, COMPLETE    6. Stage 3a chronic kidney disease (HCC)  Stable.  CPM.  Patient's recent CMP had a BUN of 18, creatinine 1.37 and a GFR of 52.  Patient's renal function appears stable.  Patient's renal function test appears similar to where the patient's test have been running.  CPM.    7. Primary osteoarthritis involving multiple joints  Stable.  CPM.    8. Hypothyroidism, unspecified type  Doing well.  CPM.  Patient's recent TSH was 1.530.  Patient is currently maintained on Synthroid/levothyroxine 0.088 mg orally daily.    9. Autoimmune hepatitis (HCC)  Patient had a bout of autoimmune hepatitis a few years ago.  Patient has made a good recovery from this and appears to be doing well.  Patient was seen by hepatologist with slightly released the patient.  Patient's recent liver function studies have been normal.    10. Cerebral aneurysm (HCC)  Patient was found to cerebral aneurysm in the emergency room a few years ago.  Patient has been evaluated by a neurosurgeon at [facility] who is determined the patient does not require surgery.  Patient does follow-up with him on a regular basis.    11. Paroxysmal atrial fibrillation (HCC)  Patient had an episode of paroxysmal atrial fibrillation with the patient was hospitalized with acute illness a few years ago.  After patient recovered he has had no more recurrence of the paroxysmal atrial fibrillation.  Patient has been maintained on low-dose aspirin 81 mg orally daily.  Patient does get occasional bruising on his arms.    12. Hyponatremia  Patient's recent sodium has come back at 130.  Previous sodiums have been normal.  Patient have a repeat BMP performed when he returns from [place] in about 3 to 4 weeks.  I will await that result.    - Basic Metabolic Panel (8); Future      Chad Beth,  MD  5/8/2024  12:59 PM

## 2024-05-29 ENCOUNTER — LAB ENCOUNTER (OUTPATIENT)
Dept: LAB | Age: 84
End: 2024-05-29
Attending: INTERNAL MEDICINE
Payer: MEDICARE

## 2024-05-29 DIAGNOSIS — E87.1 HYPONATREMIA: ICD-10-CM

## 2024-05-29 LAB
ANION GAP SERPL CALC-SCNC: 5 MMOL/L (ref 0–18)
BUN BLD-MCNC: 18 MG/DL (ref 9–23)
CALCIUM BLD-MCNC: 8.8 MG/DL (ref 8.5–10.1)
CHLORIDE SERPL-SCNC: 106 MMOL/L (ref 98–112)
CO2 SERPL-SCNC: 26 MMOL/L (ref 21–32)
CREAT BLD-MCNC: 1.23 MG/DL
EGFRCR SERPLBLD CKD-EPI 2021: 59 ML/MIN/1.73M2 (ref 60–?)
FASTING STATUS PATIENT QL REPORTED: YES
GLUCOSE BLD-MCNC: 99 MG/DL (ref 70–99)
OSMOLALITY SERPL CALC.SUM OF ELEC: 286 MOSM/KG (ref 275–295)
POTASSIUM SERPL-SCNC: 4.2 MMOL/L (ref 3.5–5.1)
SODIUM SERPL-SCNC: 137 MMOL/L (ref 136–145)

## 2024-05-29 PROCEDURE — 80048 BASIC METABOLIC PNL TOTAL CA: CPT

## 2024-05-29 RX ORDER — LEVOTHYROXINE SODIUM 88 UG/1
88 TABLET ORAL
Qty: 90 TABLET | Refills: 3 | Status: SHIPPED | OUTPATIENT
Start: 2024-05-29

## 2024-05-29 RX ORDER — METOPROLOL SUCCINATE 25 MG/1
25 TABLET, EXTENDED RELEASE ORAL NIGHTLY
Qty: 90 TABLET | Refills: 3 | Status: SHIPPED | OUTPATIENT
Start: 2024-05-29

## 2024-05-29 NOTE — TELEPHONE ENCOUNTER
Refill request is for a maintenance medication and has met the criteria specified in the Ambulatory Medication Refill Standing Order for eligibility, visits, laboratory, alerts and was sent to the requested pharmacy.    Requested Prescriptions     Signed Prescriptions Disp Refills    METOPROLOL SUCCINATE ER 25 MG Oral Tablet 24 Hr 90 tablet 3     Sig: TAKE 1 TABLET(25 MG) BY MOUTH EVERY NIGHT     Authorizing Provider: BETTINA GOLDSMITH     Ordering User: KALYAN SOOD    LEVOTHYROXINE 88 MCG Oral Tab 90 tablet 3     Sig: TAKE 1 TABLET(88 MCG) BY MOUTH BEFORE BREAKFAST     Authorizing Provider: BETTINA GOLDSMITH     Ordering User: KALYAN SOOD

## 2024-05-30 ENCOUNTER — TELEPHONE (OUTPATIENT)
Dept: INTERNAL MEDICINE CLINIC | Facility: CLINIC | Age: 84
End: 2024-05-30

## 2024-05-30 ENCOUNTER — PATIENT MESSAGE (OUTPATIENT)
Dept: INTERNAL MEDICINE CLINIC | Facility: CLINIC | Age: 84
End: 2024-05-30

## 2024-05-30 DIAGNOSIS — R53.83 FATIGUE, UNSPECIFIED TYPE: Primary | ICD-10-CM

## 2024-05-30 DIAGNOSIS — D69.6 THROMBOCYTOPENIA (HCC): ICD-10-CM

## 2024-05-30 DIAGNOSIS — N18.31 STAGE 3A CHRONIC KIDNEY DISEASE (HCC): ICD-10-CM

## 2024-05-30 DIAGNOSIS — K75.4 AUTOIMMUNE HEPATITIS (HCC): ICD-10-CM

## 2024-05-30 NOTE — TELEPHONE ENCOUNTER
----- Message from [employer]  sent at 5/30/2024  1:45 PM CDT -----  Regarding: Test Results  Contact: [phone]  Dr. Beth,  I had the Basic Metabolic Panel redone on 5/29/24. Compared to the results of the 4/30/24 test, the values are now all within the normal range.  The GFR is the highest it has been in a long time. You mentioned in my last After Visit Summary notes that after the above tests were done you would place an order in the system for a CBC, BMP and Hepatic Function Panel before my visit in 6 months with Dr. Barragan.  Thank you again for all the years of health care you have given me, especially in the last few years. Enjoy your long-term.  Nagi Finley

## 2024-05-31 NOTE — TELEPHONE ENCOUNTER
Lab tests reviewed.  They are improved from before.  CPM   I have placed orders in the system for pt to have a CBC, BMP and Hepatic Function Panel.  Message left for pt.

## 2024-10-22 ENCOUNTER — LAB ENCOUNTER (OUTPATIENT)
Dept: LAB | Age: 84
End: 2024-10-22
Attending: INTERNAL MEDICINE
Payer: MEDICARE

## 2024-10-22 DIAGNOSIS — K75.4 AUTOIMMUNE HEPATITIS (HCC): ICD-10-CM

## 2024-10-22 DIAGNOSIS — N18.31 STAGE 3A CHRONIC KIDNEY DISEASE (HCC): ICD-10-CM

## 2024-10-22 DIAGNOSIS — D69.6 THROMBOCYTOPENIA (HCC): ICD-10-CM

## 2024-10-22 DIAGNOSIS — R53.83 FATIGUE, UNSPECIFIED TYPE: ICD-10-CM

## 2024-10-22 LAB
ALBUMIN SERPL-MCNC: 4.2 G/DL (ref 3.2–4.8)
ALP LIVER SERPL-CCNC: 66 U/L
ALT SERPL-CCNC: 14 U/L
ANION GAP SERPL CALC-SCNC: 4 MMOL/L (ref 0–18)
AST SERPL-CCNC: 20 U/L (ref ?–34)
BASOPHILS # BLD AUTO: 0.05 X10(3) UL (ref 0–0.2)
BASOPHILS NFR BLD AUTO: 0.9 %
BILIRUB DIRECT SERPL-MCNC: 0.3 MG/DL (ref ?–0.3)
BILIRUB SERPL-MCNC: 1.1 MG/DL (ref 0.2–1.1)
BUN BLD-MCNC: 18 MG/DL (ref 9–23)
CALCIUM BLD-MCNC: 9.7 MG/DL (ref 8.7–10.4)
CHLORIDE SERPL-SCNC: 104 MMOL/L (ref 98–112)
CO2 SERPL-SCNC: 29 MMOL/L (ref 21–32)
CREAT BLD-MCNC: 1.25 MG/DL
EGFRCR SERPLBLD CKD-EPI 2021: 57 ML/MIN/1.73M2 (ref 60–?)
EOSINOPHIL # BLD AUTO: 0.31 X10(3) UL (ref 0–0.7)
EOSINOPHIL NFR BLD AUTO: 5.8 %
ERYTHROCYTE [DISTWIDTH] IN BLOOD BY AUTOMATED COUNT: 13.4 %
FASTING STATUS PATIENT QL REPORTED: YES
GLUCOSE BLD-MCNC: 98 MG/DL (ref 70–99)
HCT VFR BLD AUTO: 42.3 %
HGB BLD-MCNC: 14.3 G/DL
IMM GRANULOCYTES # BLD AUTO: 0.02 X10(3) UL (ref 0–1)
IMM GRANULOCYTES NFR BLD: 0.4 %
LYMPHOCYTES # BLD AUTO: 1.38 X10(3) UL (ref 1–4)
LYMPHOCYTES NFR BLD AUTO: 25.9 %
MCH RBC QN AUTO: 31 PG (ref 26–34)
MCHC RBC AUTO-ENTMCNC: 33.8 G/DL (ref 31–37)
MCV RBC AUTO: 91.6 FL
MONOCYTES # BLD AUTO: 0.48 X10(3) UL (ref 0.1–1)
MONOCYTES NFR BLD AUTO: 9 %
NEUTROPHILS # BLD AUTO: 3.08 X10 (3) UL (ref 1.5–7.7)
NEUTROPHILS # BLD AUTO: 3.08 X10(3) UL (ref 1.5–7.7)
NEUTROPHILS NFR BLD AUTO: 58 %
OSMOLALITY SERPL CALC.SUM OF ELEC: 286 MOSM/KG (ref 275–295)
PLATELET # BLD AUTO: 127 10(3)UL (ref 150–450)
POTASSIUM SERPL-SCNC: 4.7 MMOL/L (ref 3.5–5.1)
PROT SERPL-MCNC: 6.6 G/DL (ref 5.7–8.2)
RBC # BLD AUTO: 4.62 X10(6)UL
SODIUM SERPL-SCNC: 137 MMOL/L (ref 136–145)
WBC # BLD AUTO: 5.3 X10(3) UL (ref 4–11)

## 2024-10-22 PROCEDURE — 85025 COMPLETE CBC W/AUTO DIFF WBC: CPT

## 2024-10-22 PROCEDURE — 36415 COLL VENOUS BLD VENIPUNCTURE: CPT

## 2024-10-22 PROCEDURE — 80076 HEPATIC FUNCTION PANEL: CPT

## 2024-10-22 PROCEDURE — 80048 BASIC METABOLIC PNL TOTAL CA: CPT

## 2024-10-24 ENCOUNTER — TELEPHONE (OUTPATIENT)
Dept: INTERNAL MEDICINE CLINIC | Facility: CLINIC | Age: 84
End: 2024-10-24

## 2024-10-24 NOTE — TELEPHONE ENCOUNTER
Patient called and relayed Dr Barragan' message. Patient verbalized understanding with no further questions noted.

## 2024-10-24 NOTE — TELEPHONE ENCOUNTER
Please notify patient reviewed orders from Dr. Beth's last blood test.  Kidney function remains stable, platelets also remain stable slightly on the low side 127 similar to prior.    No new recommendations for now, we will do a full assessment when I see him in clinic 11/11

## 2024-11-09 NOTE — PATIENT INSTRUCTIONS
Routine clinic today for follow-up.  Today, we did review your history and review your most recent set of blood work    - We did place fasting blood work to be completed prior to your next visit  --Periodically check your blood pressures at home notify us if increasing    - Discussed that we can try taking the levothyroxine during nocturnal awakenings to maximize our absorption.  In general, recommend at least a minimum of 30-40 minutes before any other food/water/medication intake.  Only small amount of water should be used with administration of thyroid medication    Periodically check your weights at home and monitor for any leg swelling, fluid retention    Follow-up with neurosurgery for surveillance of your cerebral aneurysm.  Thankfully this has remained stable on the last MRI    Return to clinic in 4 months for Medicare annual physical .

## 2024-11-09 NOTE — PROGRESS NOTES
Chief Complaint:   Chief Complaint   Patient presents with    Follow - Up     Pt here for 6 month f/u          HPI:     Mr. GUERRERO is a 84 year old male PMHX hypertension, hyperlipidemia, stage III CKD, osteoarthritis, autoimmune hepatitis, A-fib, coming in for follow-up.    Feeling good. No CP or palpations. Staying very active.     He was diagnosed with Autoimmune hepatitis 2020 with severe fatigue. Had full work-up, had negative side effects with Azathioprine. Recalls Kidney and afib around the time of the 10 day hospitalization. He was able to recover.  He recalls episodes of passing out x 2. That's when he was diagnosed with intracranial aneuryms. Had a hematoma of the spleen.    He has lost weight during his hospitalization. Balanced meals, 3 meals a day. Tried to bake proteins - cutting out sweets. Well balanced diet.     Does have some constipation from time to time. On mentamucil and miralax but did not seem to help as much. Citrucell seems to be working better for him. Drinking lots of water. Trying to stay hydrated with good fruits and veggies. Lots of salad.    Past Medical History:    Arthritis    MRI on right ankle    Borderline high blood pressure    Borderline high cholesterol    BPH (benign prostatic hyperplasia)    Diverticulosis    Elevated liver enzymes    Enlarged prostate    Essential hypertension    Hearing impairment    High blood pressure    Hypercholesterolemia    Left axis deviation    Prostate enlargement    Surgery to reduce his prostate    Seizure (HCC)    Visual impairment     Past Surgical History:   Procedure Laterality Date    Colonoscopy N/A 3/17/2022    Procedure: COLONOSCOPY;  Surgeon: Kvng Morrow MD;  Location: [facility] ENDOSCOPY    Colonoscopy  03/2022    Contact laser surgery of prostate  20104    cystoscopy w/ Green Light laser ablation of prostate     Dil urethra stric,male,initial  04/14/2014    Dialate Urethra    Foot surgery  04/17/2014    Right Ankle Surgery    Other  surgical history  2014    Right Ankle Tendon Repair     Social History:  Social History     Socioeconomic History    Marital status:    Tobacco Use    Smoking status: Former     Current packs/day: 0.00     Types: Pipe, Cigarettes     Quit date: 6/15/1969     Years since quittin.4    Smokeless tobacco: Never   Vaping Use    Vaping status: Never Used   Substance and Sexual Activity    Alcohol use: Not Currently     Comment: Rarely    Drug use: No   Other Topics Concern    Caffeine Concern Yes     Comment: Coffee 3-4 cups daily; Soda 1 can daily     Social Drivers of Health     Financial Resource Strain: Low Risk  (12/10/2021)    Received from [facility]    Overall Financial Resource Strain (CARDIA)     Difficulty of Paying Living Expenses: Not hard at all   Transportation Needs: No Transportation Needs (12/10/2021)    [facility], [facility]    PRAPARE - Transportation     Lack of Transportation (Medical): No     Lack of Transportation (Non-Medical): No    Received from [facility], [facility]    Social Connections    Received from [facility], [facility]    Housing Stability     Family History:  Family History   Problem Relation Age of Onset    Dementia Mother     Heart Disease Mother         CAD    Heart Disease Sister     Heart Disease Father         CAD + ASHD    Renal Disease Father     Blood Disorder Brother         blood clot in leg    Cancer Brother         Prostate cancer in remission    Cancer Brother     Diabetes Brother     Hypertension Brother     Heart Disorder Sister      Allergies:  Allergies[1]  Current Meds:  Current Outpatient Medications   Medication Sig Dispense Refill    METOPROLOL SUCCINATE ER 25 MG Oral Tablet 24 Hr TAKE 1 TABLET(25 MG) BY MOUTH EVERY NIGHT 90 tablet 3    LEVOTHYROXINE 88 MCG Oral Tab TAKE 1 TABLET(88 MCG) BY MOUTH  BEFORE BREAKFAST 90 tablet 3    Cholecalciferol 50 MCG (2000 UT) Oral Cap Take 2,000 Units by mouth daily.      aspirin 81 MG Oral Tab Take 1 tablet (81 mg total) by mouth daily.        Counseling given: Not Answered       REVIEW OF SYSTEMS:   Positive Findings indicated in BOLD    Constitutional: Fever, Chills, Weight Gain, Weight Loss, Night Sweats, Fatigue, Malaise  ENT/Mouth:  Hearing Changes, Ear Pain, Nasal Congestion, Sinus Pain, Hoarseness, Sore throat, Rhinorrhea, Swallowing Difficulty  Eyes: Eye Pain, Swelling, Redness, Foreign Body, Discharge, Vision Changes  Cardiovascular: Chest Pain, SOB, PND, Dyspnea on Exertion, Orthopnea, Claudication, Edema, Palpitations  Respiratory: Cough, Sputum, Wheezing, Shortness of breath  Gastrointestinal: Nausea, Vomiting, Diarrhea, Constipation, Pain, Heartburn, Dysphagia, Bloody stools, Tarry stools  Genitourinary: Dysmenorrhea, Dysuria, Urinary Frequency, Hematuria, Urinary Incontinence, Urgency,  Flank Pain  Musculoskeletal: Arthralgias, Myalgias, Joint Swelling, Joint Stiffness, Back Pain, Neck Pain  Integumentary: Skin Lesions, Pruritis, Hair Changes, Jaundice, Nail changes  Neuro: Weakness, Numbness, Paresthesias, Loss of Consciousness, Syncope, Dizziness, Headache, Falls  Psych: Anxiety, Depression, Insomnia, Suicidal Ideation, Homicidal ideation, Memory Changes  Heme/Lymph: Bruising, Bleeding, Lymphadenopathy  Endocrine: Polyuria, Polydipsia, Temperature Intolerance    EXAM:   Vital Signs:  Blood pressure 126/74, pulse 64, temperature 97.9 °F (36.6 °C), height 6' 1\" (1.854 m), weight 213 lb (96.6 kg), SpO2 96%.     Constitutional: No acute distress. Alert and oriented x 3.  Eyes: EOMI, PERRLA, clear sclera b/l  HENT: NCAT, Moist mucous membranes, Oropharynx without erythema or exudates; +Hearing aids b/l  Neck: No JVD, no thyromegaly  Cardiovascular: S1, S2, no S3, no S4, Regular rate and rhythm, No murmurs/gallops/rubs.   Respiratory: Clear to auscultation  bilaterally.  No wheezes/rales/rhonchi  Gastrointestinal: Soft, nontender, nondistended. Positive bowel sounds x 4. No rebound tenderness. No hepatomegaly, No splenomegaly  Genitourinary: No CVA tenderness bilaterally  Neurologic: No focal neurological deficits, CN II-XII intact, light touch intact, MSK Strength 5/5 and symmetric in all extremities, normal gait, 2+ patellar tendon  Musculoskeletal: Full range of motion of all extremities, no clubbing/swelling/edema  Skin: No lesions, No erythema, no jaundice, Cap Refill < 2s  Psychiatric: Appropriate mood and affect  Heme/Lymph/Immune: No cervical LAD      DATA REVIEWED   Labs:  Recent Results (from the past 8760 hours)   Basic Metabolic Panel (8)    Collection Time: 10/22/24  7:58 AM   Result Value Ref Range    Glucose 98 70 - 99 mg/dL    Sodium 137 136 - 145 mmol/L    Potassium 4.7 3.5 - 5.1 mmol/L    Chloride 104 98 - 112 mmol/L    CO2 29.0 21.0 - 32.0 mmol/L    Anion Gap 4 0 - 18 mmol/L    BUN 18 9 - 23 mg/dL    Creatinine 1.25 0.70 - 1.30 mg/dL    Calcium, Total 9.7 8.7 - 10.4 mg/dL    Calculated Osmolality 286 275 - 295 mOsm/kg    eGFR-Cr 57 (L) >=60 mL/min/1.73m2    Patient Fasting for BMP? Yes      *Note: Due to a large number of results and/or encounters for the requested time period, some results have not been displayed. A complete set of results can be found in Results Review.       Recent Results (from the past 8760 hours)   CBC With Differential With Platelet    Collection Time: 10/22/24  7:58 AM   Result Value Ref Range    WBC 5.3 4.0 - 11.0 x10(3) uL    RBC 4.62 3.80 - 5.80 x10(6)uL    HGB 14.3 13.0 - 17.5 g/dL    HCT 42.3 39.0 - 53.0 %    .0 (L) 150.0 - 450.0 10(3)uL    MCV 91.6 80.0 - 100.0 fL    MCH 31.0 26.0 - 34.0 pg    MCHC 33.8 31.0 - 37.0 g/dL    RDW 13.4 %    Neutrophil Absolute Prelim 3.08 1.50 - 7.70 x10 (3) uL    Neutrophil Absolute 3.08 1.50 - 7.70 x10(3) uL    Lymphocyte Absolute 1.38 1.00 - 4.00 x10(3) uL    Monocyte Absolute  0.48 0.10 - 1.00 x10(3) uL    Eosinophil Absolute 0.31 0.00 - 0.70 x10(3) uL    Basophil Absolute 0.05 0.00 - 0.20 x10(3) uL    Immature Granulocyte Absolute 0.02 0.00 - 1.00 x10(3) uL    Neutrophil % 58.0 %    Lymphocyte % 25.9 %    Monocyte % 9.0 %    Eosinophil % 5.8 %    Basophil % 0.9 %    Immature Granulocyte % 0.4 %     *Note: Due to a large number of results and/or encounters for the requested time period, some results have not been displayed. A complete set of results can be found in Results Review.       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 The aneurysm measures approximately 11 x 4 mm on AP dimension (3D TOF COW images 4/65). Stable T1 hyperintense appearance of the thrombosed posterior aspect of the aneurysm (this T1 brightness could be confused for aneurysm filling but correlation with prior CT angiogram dated September 9, 2021 shows clotted area here and the MRI dated October 30, 2020 shows precontrast T1 hyperintense clot here in series 5 images 19, 20 of that old MRI).     No major vesse l occlusion, critical stenosis, or aneurysmal dilatation along the arteries of anterior and posterior circulation.     IMPRESSION:   Compared to prior MR brain dated 08/29/2023, no interval change. 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 No new aneurysm. No intracranial major vessel occlusion         ASSESSMENT AND PLAN:     Paroxysmal A-fib  - Previously on amiodarone, no longer taking  - On aspirin 81 mg daily    Chronic diastolic heart failure  - Seems to be euvolemic  - Stable on metoprolol 25 mg daily  - Periodically check blood pressures at home  -  Check weights at home    Autoimmune hepatitis  - Thought to be due to viral etiology.  No longer on azathioprine, nor budesonide  - Last LFTs, within normal limits  - No longer seeing hepatology, Dr. Hood    Thrombocytopenia  - May be related to autoimmune hepatitis  - Stable platelets 127    Cerebral aneurysm  - Incidentally found during imaging on a prior hospitalization  - MRA brain 9/2024, no major change to anterior communicating artery aneurysm  - Follows with [facility], telemedicine visit with Dr. Chappell 11/2    Stage IIIa CKD  - Last BUN 18, creatinine 1.25, EGFR 57  - Seems to be stable from prior assessments  - Continue good water intake  - Sees Dr. Cam Mazariegos 8/22/2023 - no longer seeing. Feels that he is stable    Hypertension  -Blood pressure today at goal  - Check blood pressures at home  - Continue with home metoprolol    Hyperlipidemia  -Last lipid panel at goal 4/2024  - Repeat fasting lipid panel  - Continue with conservative management    Hypothyroidism  - On levothyroxine 88 mcg once a day  - Last TFTs at goal on last check.  Will plan to repeat these prior to the next visit  - Discussed that we can try taking the levothyroxine during nocturnal awakenings to maximize our absorption.  In general, recommend at least a minimum of 30-40 minutes before any other food/water/medication intake.  Only small amount of water should be used with administration of thyroid medication    Osteoarthritis  Multiple joints affected  -Would recommend initial trial of conservative therapy:    -Acetaminophen 500-650 mg every 4-6 hours as needed for pain relief  - Avoid NSAIDs  -For more severe pain, notify us as we should consider alternative medication         Orders This Visit:  No orders of the defined types were placed in this encounter.      Meds This Visit:  Requested Prescriptions      No prescriptions requested or ordered in this encounter       Imaging & Referrals:  None     [facility]  Maintenance  Up-to-date    Spent 30 minutes obtaining history, evaluating patient, discussing treatment options, diet, exercise, review of available labs and radiology reports, and completing documentation.       Return to clinic in 4 months for follow-up    Weston Barragan MD, 11/11/24, 10:16 AM           [1]   Allergies  Allergen Reactions    Azathioprine RASH     Mouth and nose sores, shaking. am

## 2024-11-11 ENCOUNTER — OFFICE VISIT (OUTPATIENT)
Dept: INTERNAL MEDICINE CLINIC | Facility: CLINIC | Age: 84
End: 2024-11-11

## 2024-11-11 VITALS
OXYGEN SATURATION: 96 % | BODY MASS INDEX: 28.23 KG/M2 | TEMPERATURE: 98 F | HEART RATE: 64 BPM | HEIGHT: 73 IN | SYSTOLIC BLOOD PRESSURE: 126 MMHG | DIASTOLIC BLOOD PRESSURE: 74 MMHG | WEIGHT: 213 LBS

## 2024-11-11 DIAGNOSIS — E78.00 HYPERCHOLESTEROLEMIA: ICD-10-CM

## 2024-11-11 DIAGNOSIS — D64.9 ANEMIA, UNSPECIFIED TYPE: ICD-10-CM

## 2024-11-11 DIAGNOSIS — E03.9 HYPOTHYROIDISM, UNSPECIFIED TYPE: ICD-10-CM

## 2024-11-11 DIAGNOSIS — R73.01 ABNORMAL FASTING GLUCOSE: ICD-10-CM

## 2024-11-11 DIAGNOSIS — R74.8 ELEVATED LIVER ENZYMES: ICD-10-CM

## 2024-11-11 DIAGNOSIS — M15.0 PRIMARY OSTEOARTHRITIS INVOLVING MULTIPLE JOINTS: ICD-10-CM

## 2024-11-11 DIAGNOSIS — I48.0 PAROXYSMAL ATRIAL FIBRILLATION (HCC): ICD-10-CM

## 2024-11-11 DIAGNOSIS — K75.4 AUTOIMMUNE HEPATITIS (HCC): Primary | ICD-10-CM

## 2024-11-11 DIAGNOSIS — I67.1 CEREBRAL ANEURYSM (HCC): ICD-10-CM

## 2024-11-11 DIAGNOSIS — N18.31 STAGE 3A CHRONIC KIDNEY DISEASE (HCC): ICD-10-CM

## 2024-11-11 DIAGNOSIS — I10 ESSENTIAL HYPERTENSION: ICD-10-CM

## 2024-11-11 PROCEDURE — 1159F MED LIST DOCD IN RCRD: CPT | Performed by: INTERNAL MEDICINE

## 2024-11-11 PROCEDURE — 1160F RVW MEDS BY RX/DR IN RCRD: CPT | Performed by: INTERNAL MEDICINE

## 2024-11-11 PROCEDURE — 1126F AMNT PAIN NOTED NONE PRSNT: CPT | Performed by: INTERNAL MEDICINE

## 2024-11-11 PROCEDURE — 99214 OFFICE O/P EST MOD 30 MIN: CPT | Performed by: INTERNAL MEDICINE

## 2024-11-11 PROCEDURE — 3008F BODY MASS INDEX DOCD: CPT | Performed by: INTERNAL MEDICINE

## 2024-11-11 PROCEDURE — 3074F SYST BP LT 130 MM HG: CPT | Performed by: INTERNAL MEDICINE

## 2024-11-11 PROCEDURE — 3078F DIAST BP <80 MM HG: CPT | Performed by: INTERNAL MEDICINE

## 2025-02-03 NOTE — PATIENT INSTRUCTIONS
1.  Patient is to continue his current diet, medication and activity. 2.  Patient is to follow-up with his nephrologist, Dr. Ashley Oneill. 3.  Patient is also follow-up with Dr. Dianna Booth and his APN Sheri Peterson.   4.  I will plan to see the patient back i Resident

## 2025-03-05 ENCOUNTER — LAB ENCOUNTER (OUTPATIENT)
Dept: LAB | Age: 85
End: 2025-03-05
Attending: INTERNAL MEDICINE
Payer: MEDICARE

## 2025-03-05 DIAGNOSIS — R74.8 ELEVATED LIVER ENZYMES: ICD-10-CM

## 2025-03-05 DIAGNOSIS — E03.9 HYPOTHYROIDISM, UNSPECIFIED TYPE: ICD-10-CM

## 2025-03-05 DIAGNOSIS — K75.4 AUTOIMMUNE HEPATITIS (HCC): ICD-10-CM

## 2025-03-05 DIAGNOSIS — D64.9 ANEMIA, UNSPECIFIED TYPE: ICD-10-CM

## 2025-03-05 DIAGNOSIS — R73.01 ABNORMAL FASTING GLUCOSE: ICD-10-CM

## 2025-03-05 DIAGNOSIS — E78.00 HYPERCHOLESTEROLEMIA: ICD-10-CM

## 2025-03-05 DIAGNOSIS — N18.31 STAGE 3A CHRONIC KIDNEY DISEASE (HCC): ICD-10-CM

## 2025-03-05 LAB
ALBUMIN SERPL-MCNC: 4.4 G/DL (ref 3.2–4.8)
ALBUMIN/GLOB SERPL: 1.8 {RATIO} (ref 1–2)
ALP LIVER SERPL-CCNC: 71 U/L
ALT SERPL-CCNC: 12 U/L
ANION GAP SERPL CALC-SCNC: 7 MMOL/L (ref 0–18)
AST SERPL-CCNC: 17 U/L (ref ?–34)
BASOPHILS # BLD AUTO: 0.05 X10(3) UL (ref 0–0.2)
BASOPHILS NFR BLD AUTO: 0.8 %
BILIRUB SERPL-MCNC: 1.1 MG/DL (ref 0.2–1.1)
BUN BLD-MCNC: 20 MG/DL (ref 9–23)
CALCIUM BLD-MCNC: 9.3 MG/DL (ref 8.7–10.6)
CHLORIDE SERPL-SCNC: 102 MMOL/L (ref 98–112)
CHOLEST SERPL-MCNC: 181 MG/DL (ref ?–200)
CO2 SERPL-SCNC: 30 MMOL/L (ref 21–32)
CREAT BLD-MCNC: 1.24 MG/DL
EGFRCR SERPLBLD CKD-EPI 2021: 58 ML/MIN/1.73M2 (ref 60–?)
EOSINOPHIL # BLD AUTO: 0.4 X10(3) UL (ref 0–0.7)
EOSINOPHIL NFR BLD AUTO: 6.7 %
ERYTHROCYTE [DISTWIDTH] IN BLOOD BY AUTOMATED COUNT: 13.2 %
EST. AVERAGE GLUCOSE BLD GHB EST-MCNC: 120 MG/DL (ref 68–126)
FASTING PATIENT LIPID ANSWER: YES
FASTING STATUS PATIENT QL REPORTED: YES
GLOBULIN PLAS-MCNC: 2.5 G/DL (ref 2–3.5)
GLUCOSE BLD-MCNC: 100 MG/DL (ref 70–99)
HBA1C MFR BLD: 5.8 % (ref ?–5.7)
HCT VFR BLD AUTO: 44.8 %
HDLC SERPL-MCNC: 57 MG/DL (ref 40–59)
HGB BLD-MCNC: 14.6 G/DL
IMM GRANULOCYTES # BLD AUTO: 0.02 X10(3) UL (ref 0–1)
IMM GRANULOCYTES NFR BLD: 0.3 %
LDLC SERPL CALC-MCNC: 112 MG/DL (ref ?–100)
LYMPHOCYTES # BLD AUTO: 1.47 X10(3) UL (ref 1–4)
LYMPHOCYTES NFR BLD AUTO: 24.8 %
MCH RBC QN AUTO: 30.1 PG (ref 26–34)
MCHC RBC AUTO-ENTMCNC: 32.6 G/DL (ref 31–37)
MCV RBC AUTO: 92.4 FL
MONOCYTES # BLD AUTO: 0.52 X10(3) UL (ref 0.1–1)
MONOCYTES NFR BLD AUTO: 8.8 %
NEUTROPHILS # BLD AUTO: 3.47 X10 (3) UL (ref 1.5–7.7)
NEUTROPHILS # BLD AUTO: 3.47 X10(3) UL (ref 1.5–7.7)
NEUTROPHILS NFR BLD AUTO: 58.6 %
NONHDLC SERPL-MCNC: 124 MG/DL (ref ?–130)
OSMOLALITY SERPL CALC.SUM OF ELEC: 291 MOSM/KG (ref 275–295)
PLATELET # BLD AUTO: 132 10(3)UL (ref 150–450)
POTASSIUM SERPL-SCNC: 5 MMOL/L (ref 3.5–5.1)
PROT SERPL-MCNC: 6.9 G/DL (ref 5.7–8.2)
RBC # BLD AUTO: 4.85 X10(6)UL
SODIUM SERPL-SCNC: 139 MMOL/L (ref 136–145)
TRIGL SERPL-MCNC: 62 MG/DL (ref 30–149)
TSI SER-ACNC: 1.37 UIU/ML (ref 0.55–4.78)
VLDLC SERPL CALC-MCNC: 11 MG/DL (ref 0–30)
WBC # BLD AUTO: 5.9 X10(3) UL (ref 4–11)

## 2025-03-05 PROCEDURE — 36415 COLL VENOUS BLD VENIPUNCTURE: CPT

## 2025-03-05 PROCEDURE — 80053 COMPREHEN METABOLIC PANEL: CPT

## 2025-03-05 PROCEDURE — 80061 LIPID PANEL: CPT

## 2025-03-05 PROCEDURE — 83036 HEMOGLOBIN GLYCOSYLATED A1C: CPT

## 2025-03-05 PROCEDURE — 85025 COMPLETE CBC W/AUTO DIFF WBC: CPT

## 2025-03-05 PROCEDURE — 84443 ASSAY THYROID STIM HORMONE: CPT

## 2025-03-09 NOTE — PATIENT INSTRUCTIONS
- You were seen in clinic for regular annual check-up. We have ordered labs for you and we will call you with the results. Please obtain the bloodwork fasting for 10**12 hours. OK to drink water the day of your blood draw.      We have the lab downstairs on the first floor.  No appointment is necessary.  Lab hours are Monday-Friday 7:30 AM to 4:45 PM.  There may be Saturday hours 7 AM-11:00 AM as well.     Otherwise you can obtain the blood tests on the weekends at the main hospital or local immediate care/urgent care within the [facility].    There was cerumen earwax buildup in both ears.  This may be the cause of your decreased hearing.  However we should also get updated hearing examination with ENT:    Colby Conteh MD/ENT  Address: [address], [place] [address]  Phone: [phone]    There may be some adjustments needed on the hearing aids.    -Brain aneurysm seems to be stable at this time, continue following with Dr. Chappell of neurosurgery   -Please continue checking your blood pressures at home and notify us if they are increasing  - It is reasonable to discontinue colonoscopies and prostate cancer screening moving forward  - you are up-to-date on all your major vaccines  - Please continue to eat a varied diet including recommended servings of vegetables, fruits, and low fat dairy. Minimize high saturated fats (such as fast foods) and high sugar intake (such as soda)  - We recommend 150 minutes of moderate intensity exercise (brisk walking, swimming) weekly to maintain your current weight.  Targeted weight loss will require more vigorous exercise or more than 150 minutes/week.    Return to clinic in 6 months for follow-up

## 2025-03-09 NOTE — H&P
HPI:   Miguel A Finley is a 84 year old male who presents for a MA AHA (Medicare Advantage Annual Health Assessment) and Subsequent Annual Wellness visit (Pt already had Initial Annual Wellness).    No pain. Not on miralax, citrcucel to promote BMs. EVery 1-2 days. IT's changed since he has had this problem.    He still feels cold usually after he eats. It's not intense.     No falls at home.    Sleep is OK. Last night's sleep was not as good. He was warmer than usual. Forgot to turn down the thermostat as it was held at a high level.     I reviewed and updated the PMHx, FamHx, medications, allergies, and SocHx as below with the patient    SocHX  - Home: Feels safe at home  - Work: Retired  - Hobbies: lots of housework. [place]igny and mustafa   - Nutrition:    Breakfast - eggs, pancakes/English toast, cereal, oatmeal  Lunch - 1/2 sandwich, yougurt - Yoruba, fruits: grapes, bananas  Supper - cooking by wife usual. Steaks for grilling. Frozen veggies, fresh.   No snacking usually.   Lots of water. More than 1/2 water. Coffee in the morning. Glass of milk    - Physical Activity: not as much exercise. HOuse work. Up and down the stairs a lot.      No topic due editable text        Fall Risk Assessment:   He has been screened for Falls and is low risk.    Cognitive Assessment:   He had a completely normal cognitive assessment - see flowsheet entries     Functional Ability/Status:   Miguel A Finley has some abnormal functions as listed below:  He has Hearing problems based on screening of functional status.      Depression Screening (PHQ-2/PHQ-9): Over the LAST 2 WEEKS   Little interest or pleasure in doing things: Not at all  Feeling down, depressed, or hopeless: Not at all  PHQ-2 SCORE: 0      Advanced Directive:  He has a Living Will on file in gdgt; reviewed and discussed documents with patient (and family/surrogate if present).  He has a Power of  for Health Care on file in gdgt.    Tobacco:  He  smoked tobacco in the past but quit greater than 12 months ago.  Social History     Tobacco Use   Smoking Status Former    Current packs/day: 0.00    Types: Pipe, Cigarettes    Quit date: 6/15/1969    Years since quittin.7   Smokeless Tobacco Never        CAGE Alcohol Screen:   CAGE screening score of 0 on 3/11/2025, showing low risk of alcohol abuse.       Patient Care Team: Patient Care Team:  Weston Barragan MD as PCP - General (Internal Medicine)  Weil, Lowell (PODIATRIST)  Kvng Morrow MD as Gastroenterologist (GASTROENTEROLOGY)    Patient Active Problem List   Diagnosis    Borderline high cholesterol    Benign prostatic hyperplasia with lower urinary tract symptoms    Osteoarthritis    Diverticulosis of colon    Fatigue    Abnormal EKG    Essential hypertension    Bilateral impacted cerumen    Hearing loss of right ear    Thrombocytopenia    Abnormal fasting glucose    Primary osteoarthritis involving multiple joints    Acquired equinus deformity of foot    Acquired hallux valgus    Disorder of lipid metabolism    Disorder of muscle, ligament, and fascia    Family history of ischemic heart disease    Hypercholesterolemia    Impotence of organic origin    Microscopic hematuria    Nocturia    Nontraumatic rupture of tendon    Pain in joint involving ankle and foot    Polydipsia    Polyuria    Retained orthopedic hardware    Retention of urine    Tibialis tendinitis    Urethral stricture    Urgency of urination    Urinary tract infection    Venous (peripheral) insufficiency    Sepsis due to undetermined organism (HCC)    Acute cystitis without hematuria    Acute kidney injury    Persistent atrial fibrillation (HCC)    Autoimmune hepatitis (HCC)    Cerebral aneurysm (HCC)    Seizure (HCC)    Stage 3a chronic kidney disease (HCC)    Syncope and collapse    Orthostatic hypotension    Anemia    Paroxysmal atrial fibrillation (HCC)    Hypothyroidism    Elevated TSH    Carpal tunnel syndrome of right wrist     Wt  Readings from Last 3 Encounters:   03/11/25 214 lb (97.1 kg)   11/11/24 213 lb (96.6 kg)   05/08/24 212 lb 6.4 oz (96.3 kg)      Last Cholesterol Labs:   Lab Results   Component Value Date    CHOLEST 181 03/05/2025    HDL 57 03/05/2025     (H) 03/05/2025    TRIG 62 03/05/2025          Last Chemistry Labs:   Lab Results   Component Value Date    AST 17 03/05/2025    ALT 12 03/05/2025    CA 9.3 03/05/2025    ALB 4.4 03/05/2025    TSH 1.372 03/05/2025    CREATSERUM 1.24 03/05/2025     (H) 03/05/2025        CBC  (most recent labs)   Lab Results   Component Value Date    WBC 5.9 03/05/2025    HGB 14.6 03/05/2025    .0 (L) 03/05/2025        ALLERGIES:   He is allergic to azathioprine.    CURRENT MEDICATIONS:   Outpatient Medications Marked as Taking for the 3/11/25 encounter (Office Visit) with Weston Barragan MD   Medication Sig    METOPROLOL SUCCINATE ER 25 MG Oral Tablet 24 Hr TAKE 1 TABLET(25 MG) BY MOUTH EVERY NIGHT    LEVOTHYROXINE 88 MCG Oral Tab TAKE 1 TABLET(88 MCG) BY MOUTH BEFORE BREAKFAST    Cholecalciferol 50 MCG (2000 UT) Oral Cap Take 2,000 Units by mouth daily.    aspirin 81 MG Oral Tab Take 1 tablet (81 mg total) by mouth daily.      MEDICAL INFORMATION:   He  has a past medical history of Arthritis (2013), Borderline high blood pressure (2009), Borderline high cholesterol, BPH (benign prostatic hyperplasia) (2009), Diverticulosis, Elevated liver enzymes, Enlarged prostate (2009), Essential hypertension, Hearing impairment, High blood pressure, Hypercholesterolemia (03/07/2011), Left axis deviation (2002), Prostate enlargement (03/06/2014), Seizure (HCC) (11/06/2020), and Visual impairment.    He  has a past surgical history that includes contact laser surgery of prostate (20104); dil urethra stric,male,initial (04/14/2014); foot surgery (04/17/2014); other surgical history (4-); colonoscopy (N/A, 3/17/2022); and colonoscopy (03/2022).    His family history includes Blood  Disorder in his brother; Cancer in his brother and brother; Dementia in his mother; Diabetes in his brother; Heart Disease in his father, mother, and sister; Heart Disorder in his sister; Hypertension in his brother; Renal Disease in his father.   SOCIAL HISTORY:   He  reports that he quit smoking about 55 years ago. His smoking use included pipe and cigarettes. He has never used smokeless tobacco. He reports current alcohol use. He reports that he does not use drugs.     REVIEW OF SYSTEMS:   GENERAL: feels well otherwise  SKIN: denies any unusual skin lesions  EYES: denies blurred vision or double vision  HEENT: denies nasal congestion, sinus pain or ST; hearing changes+  LUNGS: denies shortness of breath with exertion  CARDIOVASCULAR: denies chest pain on exertion  GI: denies abdominal pain, denies heartburn  : 2 per night nocturia, no complaint of urinary incontinence  MUSCULOSKELETAL: denies back pain  NEURO: denies headaches  PSYCHE: denies depression or anxiety  HEMATOLOGIC: denies hx of anemia  ENDOCRINE: denies thyroid history  ALL/ASTHMA: denies hx of allergy or asthma    EXAM:   /68   Pulse 71   Temp 97.5 °F (36.4 °C) (Oral)   Ht 6' 1\" (1.854 m)   Wt 214 lb (97.1 kg)   SpO2 97%   BMI 28.23 kg/m²   Estimated body mass index is 28.23 kg/m² as calculated from the following:    Height as of this encounter: 6' 1\" (1.854 m).    Weight as of this encounter: 214 lb (97.1 kg).    Medicare Hearing Assessment  (Required for AWV/SWV)    Hearing Screening    Time taken: 3/11/2025  9:58 AM  Entry User: Cherry Hull RN  Screening Method: Questionnaire  I have a problem hearing over the telephone: Yes I have trouble following the conversations when two or more people are talking at the same time: Yes   I have trouble understanding things on the TV: Yes I have to strain to understand conversations: Yes   I have to worry about missing the telephone ring or doorbell: No I have trouble hearing conversations  in a noisy background such as a crowded room or restaurant: Yes   I get confused about where sounds come from: No I misunderstand some words in a sentence and need to ask people to repeat themselves: Yes   I especially have trouble understanding the speech of women and children: Yes I have trouble understanding the speaker in a large room such as at a meeting or place of Oriental orthodox: Yes   Many people I talk to seem to mumble (or don't speak clearly): Yes People get annoyed because I misunderstand what they say: Sometimes   I misunderstand what others are saying and make inappropriate responses: Sometimes I avoid social activities because I cannot hear well and fear I will reply improperly: No   Family members and friends have told me they think I may have hearing loss: Yes                    Visual Acuity  Right Eye Visual Acuity: Corrected Right Eye Chart Acuity: 20/25   Left Eye Visual Acuity: Corrected Left Eye Chart Acuity: 20/40   Both Eyes Visual Acuity: Corrected Both Eyes Chart Acuity: 20/30   Able To Tolerate Visual Acuity: Yes      General Appearance:  Alert, cooperative, no distress, appears stated age   Head:  Normocephalic, without obvious abnormality, atraumatic   Eyes:  PERRL, conjunctiva/corneas clear, EOM's intact, both eyes   Ears:  +Cerumen build up   Nose: Nares normal, septum midline, mucosa normal, no drainage or sinus tenderness   Throat: Lips, mucosa, and tongue normal; teeth and gums normal   Neck: Supple, symmetrical, trachea midline, no adenopathy, thyroid: not enlarged, symmetric, no tenderness/mass/nodules, no carotid bruit or JVD   Back:   Symmetric, no curvature, ROM normal, no CVA tenderness   Lungs:   Clear to auscultation bilaterally, respirations unlabored   Chest Wall:  No tenderness or deformity   Heart:  Regular rate and rhythm, S1, S2 normal, no murmur, rub or gallop   Abdomen:   Soft, non-tender, bowel sounds active all four quadrants,  no masses, no organomegaly   Genitalia:  Deferred    Rectal: Deferred   Extremities: Extremities normal, atraumatic, no cyanosis or edema   Pulses: 2+ and symmetric   Skin: Skin color, texture, turgor normal, no rashes or lesions   Lymph nodes: Cervical, supraclavicular, and axillary nodes normal   Neurologic: Normal, CN II through XII intact, 5 out of 5 muscle strength throughout, 2+ DTRs patellar tendons, normal gait          Vaccination History     Immunization History   Administered Date(s) Administered    Covid-19 Vaccine Pfizer 30 mcg/0.3 ml 01/31/2021, 02/21/2021, 09/29/2021, 03/29/2022, 05/03/2022    Covid-19 Vaccine Pfizer Bivalent 30mcg/0.3mL 09/08/2022, 06/07/2023    Covid-19 Vaccine Pfizer Elias-Sucrose 30 mcg/0.3 ml 05/03/2022    FLU VAC High Dose 65 YRS & Older PRSV Free (75237) 10/26/2015, 09/29/2016, 10/10/2017, 09/18/2018, 10/10/2019, 09/21/2020, 09/02/2021, 10/31/2022, 10/17/2023    Fluzone Vaccine Medicare () 10/25/2015, 09/28/2016, 10/09/2017, 09/18/2018, 09/19/2018, 10/10/2019, 09/21/2020, 10/31/2022    High Dose Fluzone Influenza Vaccine, 65yr+ PF 0.5mL ([id number]) 10/26/2015, 09/29/2016, 10/10/2017, 10/03/2024    Influenza 10/01/2013, 09/25/2014    Pfizer Covid-19 Vaccine 30mcg/0.3ml 12yrs+ 11/27/2023, 10/15/2024    Pneumococcal (Prevnar 13) 10/20/2014    Pneumovax 23 12/05/2006, 11/15/2018    RSV, recombinant, RSVpreF, adjuvanted (Arexvy) 10/17/2023    TDAP 11/28/2016    Zoster Vaccine Recombinant Adjuvanted (Shingrix) 02/16/2023, 04/24/2023        ASSESSMENT AND OTHER RELEVANT CHRONIC CONDITIONS:   Miguel A Finley is a 84 year old male who presents for a Medicare Assessment.       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 The aneurysm measures approximately 11 x 4 mm on AP  dimension (3D TOF COW images 4/65). Stable T1 hyperintense appearance of the thrombosed posterior aspect of the aneurysm (this T1 brightness could be confused for aneurysm filling but correlation with prior CT angiogram dated September 9, 2021 shows clotted area here and the MRI dated October 30, 2020 shows precontrast T1 hyperintense clot here in series 5 images 19, 20 of that old MRI).     No major vesse l occlusion, critical stenosis, or aneurysmal dilatation along the arteries of anterior and posterior circulation.     IMPRESSION:   Compared to prior MR brain dated 08/29/2023, no interval change. 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 No new aneurysm. No intracranial major vessel occlusion     PLAN SUMMARY:   Diagnoses and all orders for this visit:    Annual physical exam  -     Comp Metabolic Panel (14); Future    Autoimmune hepatitis (HCC)  -     Comp Metabolic Panel (14); Future    Stage 3a chronic kidney disease (HCC)  -     Comp Metabolic Panel (14); Future    Hypercholesterolemia  -     Lipid Panel; Future    Primary osteoarthritis involving multiple joints    Essential hypertension    Abnormal fasting glucose  -     Comp Metabolic Panel (14); Future  -     Hemoglobin A1C; Future    Cerebral aneurysm (HCC)    Hypothyroidism, unspecified type  -     TSH W Reflex To Free T4; Future    Paroxysmal atrial fibrillation (HCC)    Thrombocytopenia  -     CBC With Differential With Platelet; Future    Encounter for annual health examination    Bilateral impacted cerumen  -     ENT Referral - In Network    Presbycusis of both ears  -     ENT Referral - In Network         Paroxysmal A-fib  - Previously on amiodarone, no longer taking  - On aspirin 81 mg daily     Chronic diastolic heart failure  - Seems to be euvolemic  - Stable on metoprolol 25 mg daily  - Periodically check blood pressures at home  - Check  weights at home     Autoimmune hepatitis  - Thought to be due to viral etiology.  No longer on azathioprine, nor budesonide  - Last LFTs, within normal limits  - No longer seeing hepatology, Dr. Hood     Thrombocytopenia  - May be related to autoimmune hepatitis  - Stable platelets 127     Cerebral aneurysm  - Incidentally found during imaging on a prior hospitalization  - MRA brain 9/2024, no major change to anterior communicating artery aneurysm  - Follows with [facility], telemedicine visit with Dr. Chappell 11/21.  Stable partially thrombosed ACOM aneurysm.  Repeat MRA in 1 year 11/2025     Stage IIIa CKD  - Last BUN 18, creatinine 1.25, EGFR 57  - Seems to be stable from prior assessments  - Continue good water intake  - Sees Dr. Cam Mazariegos 8/22/2023 - no longer seeing. Feels that he is stable     Hypertension  -Blood pressure today at goal  - Check blood pressures at home  - Continue with home metoprolol     Hyperlipidemia  -Last lipid panel at goal 4/2024  - Repeat fasting lipid panel  - Continue with conservative management     Hypothyroidism  - On levothyroxine 88 mcg once a day  - Last TFTs at goal on last check.  Will plan to repeat these prior to the next visit  - Discussed that we can try taking the levothyroxine during nocturnal awakenings to maximize our absorption.  In general, recommend at least a minimum of 30-40 minutes before any other food/water/medication intake.  Only small amount of water should be used with administration of thyroid medication     Osteoarthritis  Multiple joints affected  -Would recommend initial trial of conservative therapy:     -Acetaminophen 500-650 mg every 4-6 hours as needed for pain relief  - Avoid NSAIDs  -For more severe pain, notify us as we should consider alternative medication    Cerumen impaction  - Localized to bilateral ears.  However Nagi reports decreased hearing more recently  -We should see ENT for ear cleaning and repeat audiology screening if  adjustments to the hearing aids needed  Colby Conteh MD/ENT  Address: [address], [place] [address]  Phone: [phone]      Diet assessment: good     PLAN:  The patient indicates understanding of these issues and agrees to the plan.  Reinforced healthy diet, lifestyle, and exercise.    No follow-ups on file.       General Health     In the past six months, have you lost more than 10 pounds without trying?: 2 - No  Has your appetite been poor?: No  How does the patient maintain a good energy level?: Appropriate Exercise  How would you describe your daily physical activity?: Light  How would you describe your current health state?: Good  How do you maintain positive mental well-being?: Social Interaction, Puzzles, Games, Visiting Friends, Visiting Family     Supplementary Documentation:   Miguel A Finley's SCREENING SCHEDULE   Tests on this list are recommended by your physician but may not be covered, or covered at this frequency, by your insurer.   Please check with your insurance carrier before scheduling to verify coverage.   PREVENTATIVE SERVICES FREQUENCY &  COVERAGE DETAILS LAST COMPLETION DATE   Diabetes Screening    Fasting Blood Sugar / Glucose    One screening every 12 months if never tested or if previously tested but not diagnosed with pre-diabetes   One screening every 6 months if diagnosed with pre-diabetes Lab Results   Component Value Date     (H) 03/05/2025        Cardiovascular Disease Screening    Lipid Panel  Cholesterol  Lipoprotein (HDL)  Triglycerides Covered every 5 years for all Medicare beneficiaries without apparent signs or symptoms of cardiovascular disease Lab Results   Component Value Date    CHOLEST 181 03/05/2025    HDL 57 03/05/2025     (H) 03/05/2025    TRIG 62 03/05/2025         Electrocardiogram (EKG)   Covered if needed at Welcome to Medicare, and non-screening if indicated for medical reasons 05/08/2024      Ultrasound Screening for  Abdominal Aortic Aneurysm (AAA) Covered once in a lifetime for one of the following risk factors    Men who are 65-75 years old and have ever smoked    Anyone with a family history -     Colorectal Cancer Screening  Covered for ages 50-85; only need ONE of the following:    Colonoscopy   Covered every 10 years    Covered every 2 years if patient is at high risk or previous colonoscopy was abnormal 03/17/2022    No recommendations at this time    Flexible Sigmoidoscopy   Covered every 4 years -    Fecal Occult Blood Test Covered annually -   Prostate Cancer Screening    Prostate-Specific Antigen (PSA) Annually Lab Results   Component Value Date    PSA 0.8 10/29/2018     There are no preventive care reminders to display for this patient.   Immunizations    Influenza Covered once per flu season  Please get every year 10/03/2024  No recommendations at this time    Pneumococcal Each vaccine ([id number] & [id number]) covered once after 65 Prevnar 13: 10/20/2014    [id number]: 11/15/2018     No recommendations at this time    Hepatitis B One screening covered for patients with certain risk factors   -  No recommendations at this time    Tetanus Toxoid Not covered by Medicare Part B unless medically necessary (cut with metal); may be covered with your pharmacy prescription benefits -    Tetanus, Diptheria and Pertusis TD and TDaP Not covered by Medicare Part B -  No recommendations at this time    Zoster Not covered by Medicare Part B; may be covered with your pharmacy  prescription benefits -  No recommendations at this time

## 2025-03-11 ENCOUNTER — OFFICE VISIT (OUTPATIENT)
Dept: INTERNAL MEDICINE CLINIC | Facility: CLINIC | Age: 85
End: 2025-03-11
Payer: COMMERCIAL

## 2025-03-11 VITALS
OXYGEN SATURATION: 97 % | WEIGHT: 214 LBS | HEART RATE: 71 BPM | BODY MASS INDEX: 28.36 KG/M2 | HEIGHT: 73 IN | SYSTOLIC BLOOD PRESSURE: 122 MMHG | TEMPERATURE: 98 F | DIASTOLIC BLOOD PRESSURE: 68 MMHG

## 2025-03-11 DIAGNOSIS — H91.13 PRESBYCUSIS OF BOTH EARS: ICD-10-CM

## 2025-03-11 DIAGNOSIS — M15.0 PRIMARY OSTEOARTHRITIS INVOLVING MULTIPLE JOINTS: ICD-10-CM

## 2025-03-11 DIAGNOSIS — E78.00 HYPERCHOLESTEROLEMIA: ICD-10-CM

## 2025-03-11 DIAGNOSIS — I48.0 PAROXYSMAL ATRIAL FIBRILLATION (HCC): ICD-10-CM

## 2025-03-11 DIAGNOSIS — E03.9 HYPOTHYROIDISM, UNSPECIFIED TYPE: ICD-10-CM

## 2025-03-11 DIAGNOSIS — K75.4 AUTOIMMUNE HEPATITIS (HCC): ICD-10-CM

## 2025-03-11 DIAGNOSIS — Z00.00 ANNUAL PHYSICAL EXAM: Primary | ICD-10-CM

## 2025-03-11 DIAGNOSIS — N18.31 STAGE 3A CHRONIC KIDNEY DISEASE (HCC): ICD-10-CM

## 2025-03-11 DIAGNOSIS — D69.6 THROMBOCYTOPENIA: ICD-10-CM

## 2025-03-11 DIAGNOSIS — Z00.00 ENCOUNTER FOR ANNUAL HEALTH EXAMINATION: ICD-10-CM

## 2025-03-11 DIAGNOSIS — I67.1 CEREBRAL ANEURYSM (HCC): ICD-10-CM

## 2025-03-11 DIAGNOSIS — H61.23 BILATERAL IMPACTED CERUMEN: ICD-10-CM

## 2025-03-11 DIAGNOSIS — R73.01 ABNORMAL FASTING GLUCOSE: ICD-10-CM

## 2025-03-11 DIAGNOSIS — I10 ESSENTIAL HYPERTENSION: ICD-10-CM

## 2025-03-12 ENCOUNTER — TELEPHONE (OUTPATIENT)
Dept: INTERNAL MEDICINE CLINIC | Facility: CLINIC | Age: 85
End: 2025-03-12

## 2025-04-27 ENCOUNTER — PATIENT OUTREACH (OUTPATIENT)
Dept: CASE MANAGEMENT | Age: 85
End: 2025-04-27

## 2025-04-27 NOTE — PROCEDURES
The office order for PCP removal request is Approved and finalized on April 27, 2025.    Removed Tam Crane MD as the patient's Primary Care Physician

## 2025-05-22 ENCOUNTER — TELEPHONE (OUTPATIENT)
Dept: INTERNAL MEDICINE CLINIC | Facility: CLINIC | Age: 85
End: 2025-05-22

## 2025-05-22 RX ORDER — LEVOTHYROXINE SODIUM 88 UG/1
88 TABLET ORAL
Qty: 90 TABLET | Refills: 3 | Status: SHIPPED | OUTPATIENT
Start: 2025-05-22

## 2025-05-22 RX ORDER — METOPROLOL SUCCINATE 25 MG/1
25 TABLET, EXTENDED RELEASE ORAL NIGHTLY
Qty: 90 TABLET | Refills: 3 | Status: SHIPPED | OUTPATIENT
Start: 2025-05-22

## 2025-05-26 ENCOUNTER — PATIENT MESSAGE (OUTPATIENT)
Dept: INTERNAL MEDICINE CLINIC | Facility: CLINIC | Age: 85
End: 2025-05-26

## 2025-07-24 ENCOUNTER — TELEPHONE (OUTPATIENT)
Dept: INTERNAL MEDICINE | Age: 85
End: 2025-07-24

## 2025-08-07 ENCOUNTER — APPOINTMENT (OUTPATIENT)
Dept: INTERNAL MEDICINE | Age: 85
End: 2025-08-07

## 2025-08-08 ENCOUNTER — TELEPHONE (OUTPATIENT)
Dept: INTERNAL MEDICINE | Age: 85
End: 2025-08-08

## 2025-08-08 ENCOUNTER — LAB SERVICES (OUTPATIENT)
Dept: LAB | Age: 85
End: 2025-08-08

## 2025-08-08 DIAGNOSIS — Z13.220 SCREENING FOR LIPOID DISORDERS: ICD-10-CM

## 2025-08-08 DIAGNOSIS — E55.9 VITAMIN D DEFICIENCY, UNSPECIFIED: ICD-10-CM

## 2025-08-08 DIAGNOSIS — N18.31 CKD STAGE 3A, GFR 45-59 ML/MIN  (CMD): ICD-10-CM

## 2025-08-08 DIAGNOSIS — Z00.00 LABORATORY TESTS ORDERED AS PART OF A COMPLETE PHYSICAL EXAM (CPE): Primary | ICD-10-CM

## 2025-08-08 DIAGNOSIS — Z13.89 SCREENING FOR NEPHROPATHY: ICD-10-CM

## 2025-08-08 DIAGNOSIS — Z00.00 LABORATORY TESTS ORDERED AS PART OF A COMPLETE PHYSICAL EXAM (CPE): ICD-10-CM

## 2025-08-08 DIAGNOSIS — Z00.00 ROUTINE GENERAL MEDICAL EXAMINATION AT A HEALTH CARE FACILITY: ICD-10-CM

## 2025-08-08 DIAGNOSIS — E03.9 ACQUIRED HYPOTHYROIDISM: ICD-10-CM

## 2025-08-08 DIAGNOSIS — Z13.0 SCREENING FOR DEFICIENCY ANEMIA: ICD-10-CM

## 2025-08-08 LAB
25(OH)D3+25(OH)D2 SERPL-MCNC: 32 NG/ML (ref 30–100)
ALBUMIN SERPL-MCNC: 3.8 G/DL (ref 3.4–5)
ALBUMIN/GLOB SERPL: 1.3 {RATIO} (ref 1–2.4)
ALP SERPL-CCNC: 81 UNITS/L (ref 45–117)
ALT SERPL-CCNC: 18 UNITS/L
ANION GAP SERPL CALC-SCNC: 9 MMOL/L (ref 7–19)
AST SERPL-CCNC: 14 UNITS/L
BASOPHILS # BLD: 0 K/MCL (ref 0–0.3)
BASOPHILS NFR BLD: 1 %
BILIRUB SERPL-MCNC: 1.4 MG/DL (ref 0.2–1)
BUN SERPL-MCNC: 23 MG/DL (ref 6–20)
BUN/CREAT SERPL: 19 (ref 7–25)
CALCIUM SERPL-MCNC: 9.2 MG/DL (ref 8.4–10.2)
CHLORIDE SERPL-SCNC: 106 MMOL/L (ref 97–110)
CHOLEST SERPL-MCNC: 179 MG/DL
CHOLEST/HDLC SERPL: 2.7 {RATIO}
CO2 SERPL-SCNC: 27 MMOL/L (ref 21–32)
CREAT SERPL-MCNC: 1.22 MG/DL (ref 0.67–1.17)
CREAT UR-MCNC: 88.1 MG/DL
DEPRECATED RDW RBC: 43.4 FL (ref 39–50)
EGFRCR SERPLBLD CKD-EPI 2021: 59 ML/MIN/{1.73_M2}
EOSINOPHIL # BLD: 0.3 K/MCL (ref 0–0.5)
EOSINOPHIL NFR BLD: 5 %
ERYTHROCYTE [DISTWIDTH] IN BLOOD: 13.4 % (ref 11–15)
FASTING DURATION TIME PATIENT: 12 HOURS (ref 0–999)
GLOBULIN SER-MCNC: 2.9 G/DL (ref 2–4)
GLUCOSE SERPL-MCNC: 108 MG/DL (ref 70–99)
HCT VFR BLD CALC: 42.6 % (ref 39–51)
HDLC SERPL-MCNC: 67 MG/DL
HGB BLD-MCNC: 14.9 G/DL (ref 13–17)
IMM GRANULOCYTES # BLD AUTO: 0 K/MCL (ref 0–0.2)
IMM GRANULOCYTES # BLD: 0 %
LDLC SERPL CALC-MCNC: 101 MG/DL
LYMPHOCYTES # BLD: 1.2 K/MCL (ref 1–4)
LYMPHOCYTES NFR BLD: 21 %
MCH RBC QN AUTO: 30.8 PG (ref 26–34)
MCHC RBC AUTO-ENTMCNC: 35 G/DL (ref 32–36.5)
MCV RBC AUTO: 88.2 FL (ref 78–100)
MICROALBUMIN UR-MCNC: 0.6 MG/DL
MICROALBUMIN/CREAT UR: 6.8 MG/G
MONOCYTES # BLD: 0.5 K/MCL (ref 0.3–0.9)
MONOCYTES NFR BLD: 9 %
NEUTROPHILS # BLD: 3.5 K/MCL (ref 1.8–7.7)
NEUTROPHILS NFR BLD: 64 %
NONHDLC SERPL-MCNC: 112 MG/DL
NRBC BLD MANUAL-RTO: 0 /100 WBC
PHOSPHATE SERPL-MCNC: 3.3 MG/DL (ref 2.4–4.7)
PLATELET # BLD AUTO: 121 K/MCL (ref 140–450)
POTASSIUM SERPL-SCNC: 4.4 MMOL/L (ref 3.4–5.1)
PROT SERPL-MCNC: 6.7 G/DL (ref 6.4–8.2)
RBC # BLD: 4.83 MIL/MCL (ref 4.5–5.9)
SODIUM SERPL-SCNC: 138 MMOL/L (ref 135–145)
TRIGL SERPL-MCNC: 56 MG/DL
TSH SERPL-ACNC: 1.05 MCUNITS/ML (ref 0.35–5)
WBC # BLD: 5.5 K/MCL (ref 4.2–11)

## 2025-08-08 PROCEDURE — 36415 COLL VENOUS BLD VENIPUNCTURE: CPT | Performed by: INTERNAL MEDICINE

## 2025-08-09 LAB — PTH-INTACT SERPL-MCNC: 75 PG/ML (ref 19–88)

## 2025-08-16 SDOH — ECONOMIC STABILITY: HOUSING INSECURITY: DO YOU HAVE PROBLEMS WITH ANY OF THE FOLLOWING?: NONE OF THE ABOVE

## 2025-08-16 SDOH — ECONOMIC STABILITY: TRANSPORTATION INSECURITY
IN THE PAST 12 MONTHS, HAS LACK OF RELIABLE TRANSPORTATION KEPT YOU FROM MEDICAL APPOINTMENTS, MEETINGS, WORK OR FROM GETTING THINGS NEEDED FOR DAILY LIVING?: NO

## 2025-08-16 SDOH — ECONOMIC STABILITY: FOOD INSECURITY: WITHIN THE PAST 12 MONTHS, THE FOOD YOU BOUGHT JUST DIDN'T LAST AND YOU DIDN'T HAVE MONEY TO GET MORE.: NEVER TRUE

## 2025-08-16 SDOH — ECONOMIC STABILITY: HOUSING INSECURITY: WHAT IS YOUR LIVING SITUATION TODAY?: I HAVE A STEADY PLACE TO LIVE

## 2025-08-16 ASSESSMENT — SOCIAL DETERMINANTS OF HEALTH (SDOH): IN THE PAST 12 MONTHS, HAS THE ELECTRIC, GAS, OIL, OR WATER COMPANY THREATENED TO SHUT OFF SERVICE IN YOUR HOME?: NO

## 2025-08-17 PROBLEM — M66.9 NONTRAUMATIC RUPTURE OF TENDON: Status: RESOLVED | Noted: 2020-07-08 | Resolved: 2025-08-17

## 2025-08-17 PROBLEM — R29.818 TRANSIENT NEUROLOGIC DEFICIT: Status: RESOLVED | Noted: 2020-10-31 | Resolved: 2025-08-17

## 2025-08-17 PROBLEM — D64.9 ANEMIA: Status: ACTIVE | Noted: 2025-08-17

## 2025-08-17 PROBLEM — M21.6X9 ACQUIRED EQUINUS DEFORMITY OF FOOT: Status: RESOLVED | Noted: 2020-07-08 | Resolved: 2025-08-17

## 2025-08-17 PROBLEM — E03.9 HYPOTHYROIDISM: Status: ACTIVE | Noted: 2021-12-13

## 2025-08-17 PROBLEM — I50.33 ACUTE ON CHRONIC HEART FAILURE WITH PRESERVED EJECTION FRACTION (HFPEF)  (CMD): Status: ACTIVE | Noted: 2020-10-22

## 2025-08-17 PROBLEM — M20.10 ACQUIRED HALLUX VALGUS: Status: RESOLVED | Noted: 2020-07-08 | Resolved: 2025-08-17

## 2025-08-17 PROBLEM — I95.1 ORTHOSTATIC HYPOTENSION: Status: RESOLVED | Noted: 2021-12-06 | Resolved: 2025-08-17

## 2025-08-17 PROBLEM — K76.9 DISORDER OF LIVER: Status: RESOLVED | Noted: 2020-05-15 | Resolved: 2025-08-17

## 2025-08-17 PROBLEM — N18.4 STAGE 4 CHRONIC KIDNEY DISEASE  (CMD): Status: RESOLVED | Noted: 2020-11-20 | Resolved: 2025-08-17

## 2025-08-17 PROBLEM — I50.9 CHRONIC HEART FAILURE  (CMD): Status: ACTIVE | Noted: 2020-10-31

## 2025-08-17 PROBLEM — R73.01 ABNORMAL FASTING GLUCOSE: Status: RESOLVED | Noted: 2019-06-05 | Resolved: 2025-08-17

## 2025-08-17 PROBLEM — D69.6 THROMBOCYTOPENIA (CMD): Status: ACTIVE | Noted: 2017-11-01

## 2025-08-17 PROBLEM — R79.89 ELEVATED TSH: Status: RESOLVED | Noted: 2021-12-13 | Resolved: 2025-08-17

## 2025-08-17 PROBLEM — N18.31 STAGE 3A CHRONIC KIDNEY DISEASE  (CMD): Status: ACTIVE | Noted: 2020-10-31

## 2025-08-17 PROBLEM — I48.91 A-FIB  (CMD): Status: ACTIVE | Noted: 2020-10-31

## 2025-08-17 PROBLEM — Z96.9 RETAINED ORTHOPEDIC HARDWARE: Status: RESOLVED | Noted: 2020-07-08 | Resolved: 2025-08-17

## 2025-08-17 PROBLEM — R55 SYNCOPE AND COLLAPSE: Status: RESOLVED | Noted: 2020-12-04 | Resolved: 2025-08-17

## 2025-08-17 PROBLEM — G56.01 CARPAL TUNNEL SYNDROME OF RIGHT WRIST: Status: RESOLVED | Noted: 2022-07-15 | Resolved: 2025-08-17

## 2025-08-17 PROBLEM — M62.9 DISORDER OF MUSCLE, LIGAMENT, AND FASCIA: Status: RESOLVED | Noted: 2020-07-08 | Resolved: 2025-08-17

## 2025-08-17 PROBLEM — M15.0 PRIMARY OSTEOARTHRITIS INVOLVING MULTIPLE JOINTS: Status: ACTIVE | Noted: 2020-06-08

## 2025-08-17 PROBLEM — R56.9 SEIZURE  (CMD): Status: RESOLVED | Noted: 2020-11-06 | Resolved: 2025-08-17

## 2025-08-17 PROBLEM — I67.1 INTRACRANIAL ANEURYSM (CMD): Status: ACTIVE | Noted: 2020-10-30

## 2025-08-17 PROBLEM — M24.20 DISORDER OF MUSCLE, LIGAMENT, AND FASCIA: Status: RESOLVED | Noted: 2020-07-08 | Resolved: 2025-08-17

## 2025-08-17 PROBLEM — H91.91 HEARING LOSS OF RIGHT EAR: Status: RESOLVED | Noted: 2017-05-25 | Resolved: 2025-08-17

## 2025-08-17 PROBLEM — N30.00 ACUTE CYSTITIS WITHOUT HEMATURIA: Status: RESOLVED | Noted: 2020-10-10 | Resolved: 2025-08-17

## 2025-08-17 PROBLEM — K74.60 CIRRHOSIS OF LIVER WITH ASCITES  (CMD): Status: RESOLVED | Noted: 2021-03-03 | Resolved: 2025-08-17

## 2025-08-17 PROBLEM — A41.9 SEPSIS DUE TO UNDETERMINED ORGANISM  (CMD): Status: RESOLVED | Noted: 2020-10-09 | Resolved: 2025-08-17

## 2025-08-17 PROBLEM — R18.8 CIRRHOSIS OF LIVER WITH ASCITES  (CMD): Status: RESOLVED | Noted: 2021-03-03 | Resolved: 2025-08-17

## 2025-08-17 PROBLEM — M76.829 TIBIALIS TENDINITIS: Status: RESOLVED | Noted: 2020-07-08 | Resolved: 2025-08-17

## 2025-08-17 PROBLEM — K75.4 AUTOIMMUNE HEPATITIS  (CMD): Chronic | Status: ACTIVE | Noted: 2020-10-22

## 2025-08-17 PROBLEM — N17.9 ACUTE KIDNEY INJURY (CMD): Status: RESOLVED | Noted: 2021-03-03 | Resolved: 2025-08-17

## 2025-08-17 PROBLEM — M25.579 PAIN IN JOINT INVOLVING ANKLE AND FOOT: Status: RESOLVED | Noted: 2020-07-08 | Resolved: 2025-08-17

## 2025-08-17 RX ORDER — LEVOTHYROXINE SODIUM 88 UG/1
88 TABLET ORAL
COMMUNITY
Start: 2025-05-22

## 2025-08-19 ENCOUNTER — APPOINTMENT (OUTPATIENT)
Dept: INTERNAL MEDICINE | Age: 85
End: 2025-08-19

## 2025-08-19 VITALS
HEART RATE: 79 BPM | TEMPERATURE: 97.1 F | BODY MASS INDEX: 28.2 KG/M2 | HEIGHT: 73 IN | DIASTOLIC BLOOD PRESSURE: 80 MMHG | WEIGHT: 212.8 LBS | SYSTOLIC BLOOD PRESSURE: 133 MMHG | OXYGEN SATURATION: 99 % | RESPIRATION RATE: 16 BRPM

## 2025-08-19 DIAGNOSIS — Z00.00 LABORATORY TESTS ORDERED AS PART OF A COMPLETE PHYSICAL EXAM (CPE): ICD-10-CM

## 2025-08-19 DIAGNOSIS — E78.00 HYPERCHOLESTEROLEMIA: ICD-10-CM

## 2025-08-19 DIAGNOSIS — Z00.00 ROUTINE GENERAL MEDICAL EXAMINATION AT A HEALTH CARE FACILITY: ICD-10-CM

## 2025-08-19 DIAGNOSIS — I10 ESSENTIAL HYPERTENSION: ICD-10-CM

## 2025-08-19 DIAGNOSIS — E53.8 VITAMIN B12 DEFICIENCY: ICD-10-CM

## 2025-08-19 DIAGNOSIS — E61.1 IRON DEFICIENCY: ICD-10-CM

## 2025-08-19 DIAGNOSIS — D69.6 THROMBOCYTOPENIA (CMD): ICD-10-CM

## 2025-08-19 DIAGNOSIS — I48.0 PAROXYSMAL ATRIAL FIBRILLATION  (CMD): ICD-10-CM

## 2025-08-19 DIAGNOSIS — Z76.89 ENCOUNTER TO ESTABLISH CARE: Primary | ICD-10-CM

## 2025-08-19 DIAGNOSIS — Z13.0 SCREENING FOR DEFICIENCY ANEMIA: ICD-10-CM

## 2025-08-19 DIAGNOSIS — N18.31 STAGE 3A CHRONIC KIDNEY DISEASE  (CMD): ICD-10-CM

## 2025-08-19 DIAGNOSIS — Z13.220 SCREENING FOR LIPOID DISORDERS: ICD-10-CM

## 2025-08-19 DIAGNOSIS — R53.82 CHRONIC FATIGUE: ICD-10-CM

## 2025-08-19 DIAGNOSIS — E03.9 ACQUIRED HYPOTHYROIDISM: ICD-10-CM

## 2025-08-19 DIAGNOSIS — M15.0 PRIMARY OSTEOARTHRITIS INVOLVING MULTIPLE JOINTS: ICD-10-CM

## 2025-10-14 ENCOUNTER — APPOINTMENT (OUTPATIENT)
Dept: INTERNAL MEDICINE | Age: 85
End: 2025-10-14

## (undated) DEVICE — MEDI-VAC NON-CONDUCTIVE SUCTION TUBING 6MM X 1.8M (6FT.) L: Brand: CARDINAL HEALTH

## (undated) DEVICE — FORCEP RADIAL JAW 4

## (undated) DEVICE — CONMED SCOPE SAVER BITE BLOCK, 20X27 MM: Brand: SCOPE SAVER

## (undated) DEVICE — LINE MNTR ADLT SET O2 INTMD

## (undated) DEVICE — KIT ENDO ORCAPOD 160/180/190

## (undated) DEVICE — KIT CLEAN ENDOKIT 1.1OZ GOWNX2

## (undated) DEVICE — 3 ML SYRINGE LUER-LOCK TIP: Brand: MONOJECT

## (undated) DEVICE — 6 ML SYRINGE LUER-LOCK TIP: Brand: MONOJECT

## (undated) NOTE — LETTER
Hospital Discharge Documentation  Please phone to schedule a hospital follow up appointment. No discharge summary available at this time, below is the most recent progress note  for your review .         From: [id number] [facility]  Phone: [phone] outpatient     dvt prophylaxis: scd  code status: full code  dispo: pending GI eval     Chart reviewed  D/w pt, wife at bedside, consultants and RN  Greater than 35 minutes spent, >50% spent counseling and coordinating of care as outlined above.          ALKPHO  --   --  63   AST  --   --  19   ALT  --   --  23   BILT  --   --  0.9   TP  --   --  5.5*          Recent Labs   Lab 12/08/21  1147   INR 1.05   PTT 32.9         • [START ON 12/9/2021] pantoprazole  40 mg Oral QAM AC   • levothyroxine  100 mcg O

## (undated) NOTE — MR AVS SNAPSHOT
[place]  [facility] [address] [address]  [phone]               Thank you for choosing us for your health care visit with Millie Alfredo MD.  We are glad to serve you and happy to provide you with this summary of your visit.   Pl Prostate cancer screening          Instructions and Information about Your Health    1. Patient is to continue his current diet, medications and activity. 2.  Patient is to continue to follow-up with Dr. Karrie Benton as he is doing.   3.  I will plan to se

## (undated) NOTE — Clinical Note
I saw Jolene Gu today in the HF/AF clinic . He is euvolemic to dry on bumex 1 mg daily and inocencio 25 mg daily. BP normal. Weight down 45+ lbs.  Renal function decreased - bun 39/cr 2.68, sodium 134, K4.9.  he converted to Sinus on amiodarone 200 mg daily, antic

## (undated) NOTE — MR AVS SNAPSHOT
[facility] [address] [id number] [phone]               Thank you for choosing us for your health care visit with Millie Alfredo MD.  We are glad to serve you and happy to provide you with this summary of your visit.   Pl Take two tablets by mouth today, then one daily. Commonly known as:  ZITHROMAX           lisinopril 10 MG Tabs   Take 1 tablet (10 mg total) by mouth daily.    Commonly known as:  PRINIVIL,ZESTRIL           promethazine-codeine 6.25-10 MG/5ML Syrp   Take

## (undated) NOTE — LETTER
[address] [place] [address]  Authorization for Invasive Procedures  1.  I hereby authorize  _________________ , my physician and whomever may be designated as the doctor's assistant, to perform the following operation and/or procedure: that can occur: fever and allergic reactions, hemolytic reactions, transmission of disease such as hepatitis, AIDS, cytomegalovirus (CMV), and flluid overload.  In the event that I wish to have autologous transfusions of my own blood, or a directed donor tr physician.      Signature of Patient:  ________________________________________________ Date: _________Time: _________    Responsible person in case of minor or unconscious: _____________________________Relationship: ____________     Witness Signature: ____

## (undated) NOTE — Clinical Note
I saw Nae Barth today in the HF/AF clinic . He is euvolemic to dry on bumex 1 mg daily and inocencio 25 mg daily. BP normal. Weight down 45+ lbs.  Renal function decreased - bun 39/cr 2.68, sodium 134, K4.9.  he converted to Sinus on amiodarone 200 mg daily, antic

## (undated) NOTE — Clinical Note
Dear Kris Barajas,    Thank you for sending Tita Joiner to see me for electrodiagnostic consultation. I appreciate your confidence in me to care for your patients. Please feel free call me with any questions at [phone] or contact me through [address] [place] [address] Rd.     Sincerely,  Jovanna Luke MD  Board Certified, Physical Medicine and Rehabilitation  Board certified, [address] [place] [address]

## (undated) NOTE — LETTER
Hospital Discharge Documentation  Please phone to schedule a hospital follow up appointment. No discharge summary available at this time, below is the most recent progress note  for your review .       From: [id number] [facility]ist's [facility]  Phone: [phone] HEENT:  Head was atraumatic and normocephalic.  Eyes: Sclera was anicteric.  Pupils were equal.   NECK:  Supple.  No JVD.    CHEST:  Symmetrical movement on inspiration  LUNGS:  No audible wheezing  CVS: S1 S2 heard, IR  ABDOMEN: Non-distended, soft obese - Heparin gtt switched to eliquis per cards, amio has been discontinued.  - C. Diff negative  - appreciate recs  - will continue to monitor.  - d/c today if ok with cardiology     Greater than 35 minutes spent, >50% spent counseling re: treatment plan and w

## (undated) NOTE — LETTER
June 8, 2018         Mary Richey MD  [address] [address]      Patient: Theresa Hayes   YOB: 1940   Date of Visit: 6/8/2018       Dear Dr. Jami Christine MD,    I saw your patient, Theresa Hayes, on 6/8/2018.  [place]

## (undated) NOTE — LETTER
[address] [place], [place] [address]PHYSIATR   Date:   7/11/2022     Name:   Smita Yi    YOB: 1940   MRN:   [id number]       WHERE IS YOUR PAIN NOW? Tyson the areas on your body where you feel the described sensations. Use the appropriate symbol. [facility] the areas of radiation. Include all affected areas. Just to complete the picture, please draw in the face. ACHE:  ^ ^ ^   NUMBNESS:  0000   PINS & NEEDLES:  = = = =                              ^ ^ ^                       0000              = = = =                                    ^ ^ ^                       0000            = = = =      BURNING:  XXXX   STABBING: ////                  XXXX                ////                         XXXX          ////     Please tyson the line below indicating your degree of pain right now  with 0 being no pain 10 being the worst pain possible.                                          0             1             2              3             4              5              6              7             8             9             10         Patient Signature:

## (undated) NOTE — Clinical Note
TCM call completed. A TE was sent to the office for an appointment request. The patient would like to discuss/review the patient's metoprolol Rx at the time of the visit. Thank you.